# Patient Record
Sex: MALE | Race: OTHER | NOT HISPANIC OR LATINO
[De-identification: names, ages, dates, MRNs, and addresses within clinical notes are randomized per-mention and may not be internally consistent; named-entity substitution may affect disease eponyms.]

---

## 2017-01-05 ENCOUNTER — RX RENEWAL (OUTPATIENT)
Age: 67
End: 2017-01-05

## 2017-01-05 DIAGNOSIS — F17.210 NICOTINE DEPENDENCE, CIGARETTES, UNCOMPLICATED: ICD-10-CM

## 2017-01-11 ENCOUNTER — RX RENEWAL (OUTPATIENT)
Age: 67
End: 2017-01-11

## 2017-03-07 ENCOUNTER — APPOINTMENT (OUTPATIENT)
Dept: PULMONOLOGY | Facility: CLINIC | Age: 67
End: 2017-03-07

## 2017-03-07 VITALS
SYSTOLIC BLOOD PRESSURE: 134 MMHG | BODY MASS INDEX: 36.2 KG/M2 | OXYGEN SATURATION: 96 % | TEMPERATURE: 98.1 F | WEIGHT: 250 LBS | DIASTOLIC BLOOD PRESSURE: 83 MMHG | HEIGHT: 69.5 IN | RESPIRATION RATE: 16 BRPM | HEART RATE: 67 BPM

## 2017-03-07 DIAGNOSIS — I73.9 PERIPHERAL VASCULAR DISEASE, UNSPECIFIED: ICD-10-CM

## 2017-03-07 DIAGNOSIS — M51.36 OTHER INTERVERTEBRAL DISC DEGENERATION, LUMBAR REGION: ICD-10-CM

## 2017-06-06 ENCOUNTER — APPOINTMENT (OUTPATIENT)
Dept: PULMONOLOGY | Facility: CLINIC | Age: 67
End: 2017-06-06

## 2017-06-06 VITALS
SYSTOLIC BLOOD PRESSURE: 139 MMHG | HEART RATE: 71 BPM | BODY MASS INDEX: 37.06 KG/M2 | DIASTOLIC BLOOD PRESSURE: 86 MMHG | TEMPERATURE: 97.8 F | OXYGEN SATURATION: 94 % | HEIGHT: 69.5 IN | RESPIRATION RATE: 15 BRPM | WEIGHT: 256 LBS

## 2017-06-06 DIAGNOSIS — J44.9 CHRONIC OBSTRUCTIVE PULMONARY DISEASE, UNSPECIFIED: ICD-10-CM

## 2017-09-06 ENCOUNTER — APPOINTMENT (OUTPATIENT)
Dept: PULMONOLOGY | Facility: CLINIC | Age: 67
End: 2017-09-06

## 2017-09-13 ENCOUNTER — TRANSCRIPTION ENCOUNTER (OUTPATIENT)
Age: 67
End: 2017-09-13

## 2018-01-16 ENCOUNTER — APPOINTMENT (OUTPATIENT)
Dept: PULMONOLOGY | Facility: CLINIC | Age: 68
End: 2018-01-16
Payer: COMMERCIAL

## 2018-01-16 VITALS
HEART RATE: 60 BPM | HEIGHT: 69.5 IN | DIASTOLIC BLOOD PRESSURE: 87 MMHG | SYSTOLIC BLOOD PRESSURE: 150 MMHG | RESPIRATION RATE: 16 BRPM | TEMPERATURE: 97.5 F | WEIGHT: 263 LBS | OXYGEN SATURATION: 97 % | BODY MASS INDEX: 38.08 KG/M2

## 2018-01-16 DIAGNOSIS — J44.9 CHRONIC OBSTRUCTIVE PULMONARY DISEASE, UNSPECIFIED: ICD-10-CM

## 2018-01-16 DIAGNOSIS — G43.009 MIGRAINE W/OUT AURA, NOT INTRACTABLE, W/OUT STATUS MIGRAINOSUS: ICD-10-CM

## 2018-01-16 DIAGNOSIS — I10 ESSENTIAL (PRIMARY) HYPERTENSION: ICD-10-CM

## 2018-01-16 DIAGNOSIS — E78.5 HYPERLIPIDEMIA, UNSPECIFIED: ICD-10-CM

## 2018-01-16 PROCEDURE — 94060 EVALUATION OF WHEEZING: CPT

## 2018-01-16 PROCEDURE — 94727 GAS DIL/WSHOT DETER LNG VOL: CPT

## 2018-01-16 PROCEDURE — 99214 OFFICE O/P EST MOD 30 MIN: CPT | Mod: 25

## 2018-01-16 PROCEDURE — 94729 DIFFUSING CAPACITY: CPT

## 2018-04-05 ENCOUNTER — TRANSCRIPTION ENCOUNTER (OUTPATIENT)
Age: 68
End: 2018-04-05

## 2018-04-06 ENCOUNTER — RESULT REVIEW (OUTPATIENT)
Age: 68
End: 2018-04-06

## 2018-04-06 ENCOUNTER — INPATIENT (INPATIENT)
Facility: HOSPITAL | Age: 68
LOS: 9 days | Discharge: NURSING, REHAB OR LONG-TERM CARE | DRG: 853 | End: 2018-04-16
Attending: SURGERY | Admitting: HOSPITALIST
Payer: MEDICARE

## 2018-04-06 VITALS
TEMPERATURE: 95 F | OXYGEN SATURATION: 94 % | HEART RATE: 92 BPM | DIASTOLIC BLOOD PRESSURE: 68 MMHG | RESPIRATION RATE: 16 BRPM | SYSTOLIC BLOOD PRESSURE: 113 MMHG

## 2018-04-06 DIAGNOSIS — L03.119 CELLULITIS OF UNSPECIFIED PART OF LIMB: ICD-10-CM

## 2018-04-06 DIAGNOSIS — L03.90 CELLULITIS, UNSPECIFIED: ICD-10-CM

## 2018-04-06 DIAGNOSIS — N17.9 ACUTE KIDNEY FAILURE, UNSPECIFIED: ICD-10-CM

## 2018-04-06 DIAGNOSIS — Z29.9 ENCOUNTER FOR PROPHYLACTIC MEASURES, UNSPECIFIED: ICD-10-CM

## 2018-04-06 DIAGNOSIS — I10 ESSENTIAL (PRIMARY) HYPERTENSION: ICD-10-CM

## 2018-04-06 DIAGNOSIS — J44.9 CHRONIC OBSTRUCTIVE PULMONARY DISEASE, UNSPECIFIED: ICD-10-CM

## 2018-04-06 DIAGNOSIS — A41.9 SEPSIS, UNSPECIFIED ORGANISM: ICD-10-CM

## 2018-04-06 LAB
ALBUMIN SERPL ELPH-MCNC: 3.3 G/DL — SIGNIFICANT CHANGE UP (ref 3.3–5)
ALP SERPL-CCNC: 113 U/L — SIGNIFICANT CHANGE UP (ref 40–120)
ALT FLD-CCNC: 46 U/L RC — HIGH (ref 10–45)
ANION GAP SERPL CALC-SCNC: 12 MMOL/L — SIGNIFICANT CHANGE UP (ref 5–17)
ANION GAP SERPL CALC-SCNC: 14 MMOL/L — SIGNIFICANT CHANGE UP (ref 5–17)
ANION GAP SERPL CALC-SCNC: 17 MMOL/L — SIGNIFICANT CHANGE UP (ref 5–17)
AST SERPL-CCNC: 32 U/L — SIGNIFICANT CHANGE UP (ref 10–40)
BASE EXCESS BLDV CALC-SCNC: 2.2 MMOL/L — HIGH (ref -2–2)
BASOPHILS # BLD AUTO: 0 K/UL — SIGNIFICANT CHANGE UP (ref 0–0.2)
BILIRUB SERPL-MCNC: 1.6 MG/DL — HIGH (ref 0.2–1.2)
BLD GP AB SCN SERPL QL: NEGATIVE — SIGNIFICANT CHANGE UP
BUN SERPL-MCNC: 40 MG/DL — HIGH (ref 7–23)
BUN SERPL-MCNC: 41 MG/DL — HIGH (ref 7–23)
BUN SERPL-MCNC: 41 MG/DL — HIGH (ref 7–23)
CA-I SERPL-SCNC: 1.1 MMOL/L — LOW (ref 1.12–1.3)
CALCIUM SERPL-MCNC: 8.2 MG/DL — LOW (ref 8.4–10.5)
CALCIUM SERPL-MCNC: 8.9 MG/DL — SIGNIFICANT CHANGE UP (ref 8.4–10.5)
CALCIUM SERPL-MCNC: 9.8 MG/DL — SIGNIFICANT CHANGE UP (ref 8.4–10.5)
CHLORIDE BLDV-SCNC: 104 MMOL/L — SIGNIFICANT CHANGE UP (ref 96–108)
CHLORIDE SERPL-SCNC: 95 MMOL/L — LOW (ref 96–108)
CHLORIDE SERPL-SCNC: 97 MMOL/L — SIGNIFICANT CHANGE UP (ref 96–108)
CHLORIDE SERPL-SCNC: 97 MMOL/L — SIGNIFICANT CHANGE UP (ref 96–108)
CO2 BLDV-SCNC: 28 MMOL/L — SIGNIFICANT CHANGE UP (ref 22–30)
CO2 SERPL-SCNC: 24 MMOL/L — SIGNIFICANT CHANGE UP (ref 22–31)
CREAT SERPL-MCNC: 1.35 MG/DL — HIGH (ref 0.5–1.3)
CREAT SERPL-MCNC: 1.51 MG/DL — HIGH (ref 0.5–1.3)
CREAT SERPL-MCNC: 1.82 MG/DL — HIGH (ref 0.5–1.3)
EOSINOPHIL # BLD AUTO: 0.1 K/UL — SIGNIFICANT CHANGE UP (ref 0–0.5)
EOSINOPHIL NFR BLD AUTO: 1 % — SIGNIFICANT CHANGE UP (ref 0–6)
ERYTHROCYTE [SEDIMENTATION RATE] IN BLOOD: 66 MM/HR — HIGH (ref 0–20)
GAS PNL BLDV: 128 MMOL/L — LOW (ref 136–145)
GAS PNL BLDV: SIGNIFICANT CHANGE UP
GAS PNL BLDV: SIGNIFICANT CHANGE UP
GLUCOSE BLDV-MCNC: 139 MG/DL — HIGH (ref 70–99)
GLUCOSE SERPL-MCNC: 115 MG/DL — HIGH (ref 70–99)
GLUCOSE SERPL-MCNC: 139 MG/DL — HIGH (ref 70–99)
GLUCOSE SERPL-MCNC: 196 MG/DL — HIGH (ref 70–99)
HBA1C BLD-MCNC: 6 % — HIGH (ref 4–5.6)
HCO3 BLDV-SCNC: 27 MMOL/L — SIGNIFICANT CHANGE UP (ref 21–29)
HCT VFR BLD CALC: 36.9 % — LOW (ref 39–50)
HCT VFR BLD CALC: 47.5 % — SIGNIFICANT CHANGE UP (ref 39–50)
HCT VFR BLDA CALC: 52 % — HIGH (ref 39–50)
HGB BLD CALC-MCNC: 17.1 G/DL — HIGH (ref 13–17)
HGB BLD-MCNC: 12.4 G/DL — LOW (ref 13–17)
HGB BLD-MCNC: 16.1 G/DL — SIGNIFICANT CHANGE UP (ref 13–17)
LACTATE BLDV-MCNC: 2.8 MMOL/L — HIGH (ref 0.7–2)
LACTATE SERPL-SCNC: 1.6 MMOL/L — SIGNIFICANT CHANGE UP (ref 0.7–2)
LYMPHOCYTES # BLD AUTO: 0.7 K/UL — LOW (ref 1–3.3)
LYMPHOCYTES # BLD AUTO: 4 % — LOW (ref 13–44)
MCHC RBC-ENTMCNC: 32.2 PG — SIGNIFICANT CHANGE UP (ref 27–34)
MCHC RBC-ENTMCNC: 32.2 PG — SIGNIFICANT CHANGE UP (ref 27–34)
MCHC RBC-ENTMCNC: 33.7 GM/DL — SIGNIFICANT CHANGE UP (ref 32–36)
MCHC RBC-ENTMCNC: 33.9 GM/DL — SIGNIFICANT CHANGE UP (ref 32–36)
MCV RBC AUTO: 94.9 FL — SIGNIFICANT CHANGE UP (ref 80–100)
MCV RBC AUTO: 95.6 FL — SIGNIFICANT CHANGE UP (ref 80–100)
MONOCYTES # BLD AUTO: 1 K/UL — HIGH (ref 0–0.9)
MONOCYTES NFR BLD AUTO: 8 % — SIGNIFICANT CHANGE UP (ref 2–14)
NEUTROPHILS # BLD AUTO: 15.7 K/UL — HIGH (ref 1.8–7.4)
NEUTROPHILS NFR BLD AUTO: 82 % — HIGH (ref 43–77)
NEUTS BAND # BLD: 5 % — SIGNIFICANT CHANGE UP (ref 0–8)
OTHER CELLS CSF MANUAL: 10 ML/DL — LOW (ref 18–22)
PCO2 BLDV: 45 MMHG — SIGNIFICANT CHANGE UP (ref 35–50)
PH BLDV: 7.4 — SIGNIFICANT CHANGE UP (ref 7.35–7.45)
PLAT MORPH BLD: NORMAL — SIGNIFICANT CHANGE UP
PLATELET # BLD AUTO: 154 K/UL — SIGNIFICANT CHANGE UP (ref 150–400)
PLATELET # BLD AUTO: 195 K/UL — SIGNIFICANT CHANGE UP (ref 150–400)
PO2 BLDV: 26 MMHG — SIGNIFICANT CHANGE UP (ref 25–45)
POTASSIUM BLDV-SCNC: 3.2 MMOL/L — LOW (ref 3.5–5)
POTASSIUM SERPL-MCNC: 3.4 MMOL/L — LOW (ref 3.5–5.3)
POTASSIUM SERPL-MCNC: 3.7 MMOL/L — SIGNIFICANT CHANGE UP (ref 3.5–5.3)
POTASSIUM SERPL-MCNC: 3.8 MMOL/L — SIGNIFICANT CHANGE UP (ref 3.5–5.3)
POTASSIUM SERPL-SCNC: 3.4 MMOL/L — LOW (ref 3.5–5.3)
POTASSIUM SERPL-SCNC: 3.7 MMOL/L — SIGNIFICANT CHANGE UP (ref 3.5–5.3)
POTASSIUM SERPL-SCNC: 3.8 MMOL/L — SIGNIFICANT CHANGE UP (ref 3.5–5.3)
PROT SERPL-MCNC: 7.9 G/DL — SIGNIFICANT CHANGE UP (ref 6–8.3)
RBC # BLD: 3.86 M/UL — LOW (ref 4.2–5.8)
RBC # BLD: 5 M/UL — SIGNIFICANT CHANGE UP (ref 4.2–5.8)
RBC # FLD: 13.3 % — SIGNIFICANT CHANGE UP (ref 10.3–14.5)
RBC # FLD: 13.4 % — SIGNIFICANT CHANGE UP (ref 10.3–14.5)
RBC BLD AUTO: SIGNIFICANT CHANGE UP
RH IG SCN BLD-IMP: POSITIVE — SIGNIFICANT CHANGE UP
SAO2 % BLDV: 44 % — LOW (ref 67–88)
SODIUM SERPL-SCNC: 133 MMOL/L — LOW (ref 135–145)
SODIUM SERPL-SCNC: 135 MMOL/L — SIGNIFICANT CHANGE UP (ref 135–145)
SODIUM SERPL-SCNC: 136 MMOL/L — SIGNIFICANT CHANGE UP (ref 135–145)
WBC # BLD: 12.2 K/UL — HIGH (ref 3.8–10.5)
WBC # BLD: 17.4 K/UL — HIGH (ref 3.8–10.5)
WBC # FLD AUTO: 12.2 K/UL — HIGH (ref 3.8–10.5)
WBC # FLD AUTO: 17.4 K/UL — HIGH (ref 3.8–10.5)

## 2018-04-06 PROCEDURE — 99223 1ST HOSP IP/OBS HIGH 75: CPT | Mod: GC

## 2018-04-06 PROCEDURE — 73620 X-RAY EXAM OF FOOT: CPT | Mod: 26,LT

## 2018-04-06 PROCEDURE — 99285 EMERGENCY DEPT VISIT HI MDM: CPT | Mod: GC

## 2018-04-06 PROCEDURE — 73590 X-RAY EXAM OF LOWER LEG: CPT | Mod: 26,LT

## 2018-04-06 PROCEDURE — 11042 DBRDMT SUBQ TIS 1ST 20SQCM/<: CPT | Mod: 59,GC

## 2018-04-06 PROCEDURE — 76377 3D RENDER W/INTRP POSTPROCES: CPT | Mod: 26

## 2018-04-06 PROCEDURE — 73700 CT LOWER EXTREMITY W/O DYE: CPT | Mod: 26,LT

## 2018-04-06 PROCEDURE — 73610 X-RAY EXAM OF ANKLE: CPT | Mod: 26,LT

## 2018-04-06 PROCEDURE — 28008 INCISION OF FOOT FASCIA: CPT | Mod: GC

## 2018-04-06 PROCEDURE — 27600 DECOMPRESSION OF LOWER LEG: CPT | Mod: GC

## 2018-04-06 PROCEDURE — 11045 DBRDMT SUBQ TISS EACH ADDL: CPT | Mod: 59,GC

## 2018-04-06 PROCEDURE — 88304 TISSUE EXAM BY PATHOLOGIST: CPT | Mod: 26

## 2018-04-06 PROCEDURE — 99223 1ST HOSP IP/OBS HIGH 75: CPT

## 2018-04-06 PROCEDURE — 99254 IP/OBS CNSLTJ NEW/EST MOD 60: CPT | Mod: 25

## 2018-04-06 RX ORDER — CEFEPIME 1 G/1
2000 INJECTION, POWDER, FOR SOLUTION INTRAMUSCULAR; INTRAVENOUS EVERY 12 HOURS
Qty: 0 | Refills: 0 | Status: DISCONTINUED | OUTPATIENT
Start: 2018-04-06 | End: 2018-04-06

## 2018-04-06 RX ORDER — HYDROMORPHONE HYDROCHLORIDE 2 MG/ML
0.5 INJECTION INTRAMUSCULAR; INTRAVENOUS; SUBCUTANEOUS
Qty: 0 | Refills: 0 | Status: DISCONTINUED | OUTPATIENT
Start: 2018-04-06 | End: 2018-04-06

## 2018-04-06 RX ORDER — VANCOMYCIN HCL 1 G
2000 VIAL (EA) INTRAVENOUS ONCE
Qty: 0 | Refills: 0 | Status: COMPLETED | OUTPATIENT
Start: 2018-04-06 | End: 2018-04-06

## 2018-04-06 RX ORDER — SODIUM CHLORIDE 9 MG/ML
1000 INJECTION INTRAMUSCULAR; INTRAVENOUS; SUBCUTANEOUS ONCE
Qty: 0 | Refills: 0 | Status: COMPLETED | OUTPATIENT
Start: 2018-04-06 | End: 2018-04-06

## 2018-04-06 RX ORDER — SODIUM CHLORIDE 9 MG/ML
500 INJECTION INTRAMUSCULAR; INTRAVENOUS; SUBCUTANEOUS ONCE
Qty: 0 | Refills: 0 | Status: COMPLETED | OUTPATIENT
Start: 2018-04-06 | End: 2018-04-06

## 2018-04-06 RX ORDER — HYDROMORPHONE HYDROCHLORIDE 2 MG/ML
1 INJECTION INTRAMUSCULAR; INTRAVENOUS; SUBCUTANEOUS
Qty: 0 | Refills: 0 | Status: DISCONTINUED | OUTPATIENT
Start: 2018-04-06 | End: 2018-04-07

## 2018-04-06 RX ORDER — MEROPENEM 1 G/30ML
1000 INJECTION INTRAVENOUS EVERY 8 HOURS
Qty: 0 | Refills: 0 | Status: DISCONTINUED | OUTPATIENT
Start: 2018-04-06 | End: 2018-04-06

## 2018-04-06 RX ORDER — IPRATROPIUM/ALBUTEROL SULFATE 18-103MCG
3 AEROSOL WITH ADAPTER (GRAM) INHALATION EVERY 6 HOURS
Qty: 0 | Refills: 0 | Status: DISCONTINUED | OUTPATIENT
Start: 2018-04-06 | End: 2018-04-06

## 2018-04-06 RX ORDER — VANCOMYCIN HCL 1 G
1000 VIAL (EA) INTRAVENOUS EVERY 8 HOURS
Qty: 0 | Refills: 0 | Status: DISCONTINUED | OUTPATIENT
Start: 2018-04-06 | End: 2018-04-08

## 2018-04-06 RX ORDER — VANCOMYCIN HCL 1 G
500 VIAL (EA) INTRAVENOUS EVERY 8 HOURS
Qty: 0 | Refills: 0 | Status: DISCONTINUED | OUTPATIENT
Start: 2018-04-06 | End: 2018-04-06

## 2018-04-06 RX ORDER — CEFEPIME 1 G/1
2000 INJECTION, POWDER, FOR SOLUTION INTRAMUSCULAR; INTRAVENOUS EVERY 24 HOURS
Qty: 0 | Refills: 0 | Status: DISCONTINUED | OUTPATIENT
Start: 2018-04-06 | End: 2018-04-06

## 2018-04-06 RX ORDER — ONDANSETRON 8 MG/1
4 TABLET, FILM COATED ORAL EVERY 6 HOURS
Qty: 0 | Refills: 0 | Status: DISCONTINUED | OUTPATIENT
Start: 2018-04-06 | End: 2018-04-06

## 2018-04-06 RX ORDER — MEROPENEM 1 G/30ML
1000 INJECTION INTRAVENOUS ONCE
Qty: 0 | Refills: 0 | Status: COMPLETED | OUTPATIENT
Start: 2018-04-06 | End: 2018-04-06

## 2018-04-06 RX ORDER — MEROPENEM 1 G/30ML
1000 INJECTION INTRAVENOUS EVERY 8 HOURS
Qty: 0 | Refills: 0 | Status: DISCONTINUED | OUTPATIENT
Start: 2018-04-06 | End: 2018-04-08

## 2018-04-06 RX ORDER — SODIUM CHLORIDE 9 MG/ML
1000 INJECTION INTRAMUSCULAR; INTRAVENOUS; SUBCUTANEOUS
Qty: 0 | Refills: 0 | Status: DISCONTINUED | OUTPATIENT
Start: 2018-04-06 | End: 2018-04-06

## 2018-04-06 RX ORDER — NALOXONE HYDROCHLORIDE 4 MG/.1ML
0.1 SPRAY NASAL
Qty: 0 | Refills: 0 | Status: DISCONTINUED | OUTPATIENT
Start: 2018-04-06 | End: 2018-04-06

## 2018-04-06 RX ORDER — VANCOMYCIN HCL 1 G
1000 VIAL (EA) INTRAVENOUS EVERY 8 HOURS
Qty: 0 | Refills: 0 | Status: DISCONTINUED | OUTPATIENT
Start: 2018-04-06 | End: 2018-04-06

## 2018-04-06 RX ORDER — SODIUM CHLORIDE 9 MG/ML
1000 INJECTION, SOLUTION INTRAVENOUS
Qty: 0 | Refills: 0 | Status: DISCONTINUED | OUTPATIENT
Start: 2018-04-06 | End: 2018-04-06

## 2018-04-06 RX ORDER — HEPARIN SODIUM 5000 [USP'U]/ML
5000 INJECTION INTRAVENOUS; SUBCUTANEOUS EVERY 8 HOURS
Qty: 0 | Refills: 0 | Status: DISCONTINUED | OUTPATIENT
Start: 2018-04-06 | End: 2018-04-06

## 2018-04-06 RX ORDER — HEPARIN SODIUM 5000 [USP'U]/ML
5000 INJECTION INTRAVENOUS; SUBCUTANEOUS EVERY 8 HOURS
Qty: 0 | Refills: 0 | Status: DISCONTINUED | OUTPATIENT
Start: 2018-04-06 | End: 2018-04-08

## 2018-04-06 RX ORDER — METOPROLOL TARTRATE 50 MG
5 TABLET ORAL EVERY 6 HOURS
Qty: 0 | Refills: 0 | Status: DISCONTINUED | OUTPATIENT
Start: 2018-04-06 | End: 2018-04-06

## 2018-04-06 RX ORDER — HYDROMORPHONE HYDROCHLORIDE 2 MG/ML
30 INJECTION INTRAMUSCULAR; INTRAVENOUS; SUBCUTANEOUS
Qty: 0 | Refills: 0 | Status: DISCONTINUED | OUTPATIENT
Start: 2018-04-06 | End: 2018-04-06

## 2018-04-06 RX ORDER — HYDROMORPHONE HYDROCHLORIDE 2 MG/ML
0.5 INJECTION INTRAMUSCULAR; INTRAVENOUS; SUBCUTANEOUS
Qty: 0 | Refills: 0 | Status: DISCONTINUED | OUTPATIENT
Start: 2018-04-06 | End: 2018-04-07

## 2018-04-06 RX ORDER — CEFEPIME 1 G/1
1000 INJECTION, POWDER, FOR SOLUTION INTRAMUSCULAR; INTRAVENOUS ONCE
Qty: 0 | Refills: 0 | Status: COMPLETED | OUTPATIENT
Start: 2018-04-06 | End: 2018-04-06

## 2018-04-06 RX ADMIN — SODIUM CHLORIDE 100 MILLILITER(S): 9 INJECTION INTRAMUSCULAR; INTRAVENOUS; SUBCUTANEOUS at 06:45

## 2018-04-06 RX ADMIN — SODIUM CHLORIDE 1000 MILLILITER(S): 9 INJECTION INTRAMUSCULAR; INTRAVENOUS; SUBCUTANEOUS at 02:46

## 2018-04-06 RX ADMIN — Medication 100 MILLIGRAM(S): at 23:21

## 2018-04-06 RX ADMIN — MEROPENEM 100 MILLIGRAM(S): 1 INJECTION INTRAVENOUS at 06:45

## 2018-04-06 RX ADMIN — MEROPENEM 100 MILLIGRAM(S): 1 INJECTION INTRAVENOUS at 20:20

## 2018-04-06 RX ADMIN — CEFEPIME 100 MILLIGRAM(S): 1 INJECTION, POWDER, FOR SOLUTION INTRAMUSCULAR; INTRAVENOUS at 02:58

## 2018-04-06 RX ADMIN — Medication 250 MILLIGRAM(S): at 02:58

## 2018-04-06 RX ADMIN — SODIUM CHLORIDE 500 MILLILITER(S): 9 INJECTION INTRAMUSCULAR; INTRAVENOUS; SUBCUTANEOUS at 19:58

## 2018-04-06 RX ADMIN — Medication 250 MILLIGRAM(S): at 19:36

## 2018-04-06 RX ADMIN — SODIUM CHLORIDE 1000 MILLILITER(S): 9 INJECTION INTRAMUSCULAR; INTRAVENOUS; SUBCUTANEOUS at 02:58

## 2018-04-06 RX ADMIN — HEPARIN SODIUM 5000 UNIT(S): 5000 INJECTION INTRAVENOUS; SUBCUTANEOUS at 23:06

## 2018-04-06 RX ADMIN — Medication 100 MILLIGRAM(S): at 05:45

## 2018-04-06 NOTE — H&P ADULT - NSHPSOCIALHISTORY_GEN_ALL_CORE
former smoker former smoker (since 19yo, 2ppd, quit ~1.5yrs ago) Former smoker (since 17yo, 2ppd, quit ~1.5yrs ago), denies EtOH, drug abuse   (3 yrs), lives with wife and a dog  Independent ADLs at home

## 2018-04-06 NOTE — H&P ADULT - HISTORY OF PRESENT ILLNESS
67M with PMH COPD, HTN presenting with LLE pain (8/10), discoloration, and weeping since Wednesday. Pt was in the [facility] and felt an insect bite on his foot on Wednesday night. Pain began the next morning. Pt denies any fever, chills, SOB, palpitations, n/v/d/c. 67M with PMH COPD, PAD, HTN presenting with LLE pain (8/10), discoloration. Pt was in the [facility] and felt a bug bite on LLE on Sunday night. Pain began the next morning, and he started to have erythema in the proximal left thigh and left anterior thigh. Erythema and pain improved in the proximal LE, but worsened in distal. On Wednesday, pt was flying back to the , and noticed bullae formation on the L. foot, which he punctured w/ a toothpick, inducing yellow pus.   Pt denies any fever, chills, SOB, palpitations, n/v/d/c. 67M with PMH COPD, PAD, HTN presenting with LLE pain (8/10), discoloration. Pt was in the [facility] and felt a bug bite on LLE on Sunday night. Pain began the next morning, and he started to have erythema in the proximal left thigh and left anterior thigh. Erythema and pain improved in the proximal LE, but worsened in distally to include the L foot. On Wednesday, pt was flying back to the , and noticed bullae formation on the L. foot, which he punctured w/ a toothpick, inducing yellow pus. Associated symptoms include: cough w/ green sputum, feel hot/cold, Of note, pt was using naproxen for pain and had contact w/ stray dogs in the [facility].    Pt denies any recent antibiotic use, hospitalizations, sick contacts, SOB, CP, palpitations, n/v/d/c.    In ED, vitals were T: 95.2 (improved to 98), HR: 62-92, BP: 105-119/ 62-72 RR; 18, SpO2: 98% RA  Pt was given 2L NS, IVF NS @ 100 mL/hr, Vancomycin 2g, Cefepime 1g, Clindamycin 600mg, Meropenem 1g  Labs were significant: WBC: 17.4, SCr: 1.82 (no baseline), TBili: 1.6, ESR: 66  CT scan: circumferential SQ edema in LLE, but no soft tissue gas, or foreign body 67M with PMH COPD, PAD, HTN presenting with LLE pain (8/10), discoloration. Pt was in the [facility] and felt a bug bite on LLE on Sunday night. Pain began the next morning, and he started to have erythema in the proximal left thigh and left anterior thigh. Erythema and pain improved in the proximal LE, but worsened in distally to include the L foot. On Wednesday, pt was flying back to the , and noticed bullae formation on the L. foot, which he punctured w/ a toothpick, inducing yellow pus. Associated symptoms include: cough w/ green sputum (since Sun night), feel hot/cold (Mon only), Of note, pt was using naproxen for pain and had contact w/ stray dogs in the [facility].    Pt denies any recent antibiotic use, hospitalizations, sick contacts, SOB, CP, palpitations, n/v/d/c.    In ED, vitals were T: 95.2 (improved to 98), HR: 62-92, BP: 105-119/ 62-72 RR; 18, SpO2: 98% RA  Pt was given 2L NS, IVF NS @ 100 mL/hr, Vancomycin 2g, Cefepime 1g, Clindamycin 600mg, Meropenem 1g  Labs were significant: WBC: 17.4, SCr: 1.82 (no baseline), TBili: 1.6, ESR: 66  CT scan: circumferential SQ edema in LLE, but no soft tissue gas, or foreign body   LLE X-rays: no soft tissue gas, no osteomyelitis 67M with PMH COPD, PAD, HTN presenting with LLE pain (8/10), discoloration. Pt was in the [facility] and felt a bug bite on LLE on Sunday night. Pain began the next morning, and he started to have erythema in the proximal left thigh and left anterior thigh. Erythema and pain improved in the proximal LE, but worsened in distally to include the L foot. On Wednesday, pt was flying back to the , and noticed bullae formation on the L. foot, which he punctured w/ a toothpick, inducing yellow pus. Associated symptoms include: loss of appetite, cough w/ green sputum (since Sun night), feel hot/cold (Mon only), Of note, pt was using naproxen for pain and had contact w/ stray dogs in the [facility].    Pt denies any recent antibiotic use, hospitalizations, sick contacts, SOB, CP, palpitations, n/v/d/c.    In ED, vitals were T: 95.2 (improved to 98), HR: 62-92, BP: 105-119/ 62-72 RR; 18, SpO2: 98% RA  Pt was given 2L NS, IVF NS @ 100 mL/hr, Vancomycin 2g, Cefepime 1g, Clindamycin 600mg, Meropenem 1g  Labs were significant: WBC: 17.4, SCr: 1.82 (no baseline), TBili: 1.6, ESR: 66  CT scan: circumferential SQ edema in LLE, but no soft tissue gas, or foreign body   LLE X-rays: no soft tissue gas, no osteomyelitis

## 2018-04-06 NOTE — CONSULT NOTE ADULT - SUBJECTIVE AND OBJECTIVE BOX
67 male with COPD, PAD, HTN presenting with LLE pain (8/10), discoloration. Pt was in the [facility] on vacation to visit his wife's family and his left leg started becoming red and swollen on Sunday night. Pain began the next morning, and he started to have erythema in the proximal left thigh and left anterior thigh. Erythema and pain improved in the proximal LE, but worsened in distal. On Wednesday, pt was flying back to the [place], and noticed bullae formation on the L. foot, which he punctured w/ a toothpick, inducing yellow pus. Pt denies any fever, chills, and SOB. No sick contacts. He got 1 dose of vancomycin, meropenem, clindamycin, cefepime in the ed. NO confirmed insect bite.       REVIEW OF SYSTEMS  All as below unless noted otherwise    General:  Denies fever and chills. 	  Ophthalmologic: Denies any visual complaints. 	  ENMT: No throat pain.  Respiratory: No cough, sputum. No shortness of breath.   Cardiovascular: No chest pain.   Gastrointestinal: No nausea or vomiting. No abdominal pain or diarrhea.   Genitourinary: No burning urine, no frequency. No flank pain  Musculoskeletal: has left leg and foot pain, swelling and redness   Neurological: No confusion. 	  Skin: No rash    PAST MEDICAL & SURGICAL HISTORY:  Smoking addiction  COPD  PAD  HTN  Primary osteoarthritis of right hip  Seasonal allergies  Asthma: no h/o hospitalization or intubation  HTN (hypertension)  S/P hip replacement: right  Cataract: h/o Cataract surgery  4 yrs ago  Colon polyp: h/o colon polypectomy and partial colectomy 2007  Gall stones: h/o Cholecystectomy 2008    FAMILY HISTORY:   COPD in father  GI bleed in mother     SOCIAL HISTORY:    former smoker     retired postman     ANTIMICROBIALS:    got 1 dose of vancomycin, meropenem, clindamycin, cefepime    OTHER MEDS:    heparin  Injectable 5000 Unit(s) SubCutaneous every 8 hours  sodium chloride 0.9%. 1000 milliLiter(s) IV Continuous <Continuous>    Allergies  All nuts (Anaphylaxis)  penicillin (Anaphylaxis)  shellfish (Anaphylaxis)      Vital Signs Last 24 Hrs  T(C): 36.4 (06 Apr 2018 09:26), Max: 36.8 (06 Apr 2018 07:30)  T(F): 97.6 (06 Apr 2018 09:26), Max: 98.2 (06 Apr 2018 07:30)  HR: 77 (06 Apr 2018 09:26) (62 - 92)  BP: 108/69 (06 Apr 2018 09:26) (105/62 - 116/72)  RR: 18 (06 Apr 2018 09:26) (16 - 18)  SpO2: 98% (06 Apr 2018 09:26) (94% - 98%)      PHYSICAL EXAM:  patient in no acute respiratory distress.  Constitutional: Comfortable. Awake and alert- obese male  Eyes: PERRL EOMI. No discharge.  ENMT: No sinus tenderness.  No pharyngeal erythema.   Neck: Supple. No thyromegaly   Respiratory:  air entry bilaterally with some wheezes, no rhonchi, or crackles  Cardiovascular:S1 S2 wnl, No murmurs  Gastrointestinal: Soft, no tenderness , bowel sounds present,   Genitourinary: No suprapubic tenderness. no CVA tenderness   Musculoskeletal: Left leg swollen, warm, erythematous with one draining ulcer- yellow clear fluid  left foot on the dorsum seem to have some purulent fluid under the skin and swollen and very tender with some black and white tissue- concern for necrotic tissue, foul smelling, with areas of blood underneath that loose skin layer almost like a large bullae  Vascular: peripheral pulses felt  Neurological: No grossly focal deficits  Skin: see above  Lymph Nodes: No palpable Lymphadenopathy   Psychiatric: Affect normal  Lines:                           16.1   17.4  )-----------( 195      ( 06 Apr 2018 02:41 )             47.5     WBC Count: 17.4 (04-06 @ 02:41)    04-06    136  |  95<L>  |  40<H>  ----------------------------<  139<H>  3.4<L>   |  24  |  1.82<H>    Ca    9.8      06 Apr 2018 02:41    TPro  7.9  /  Alb  3.3  /  TBili  1.6<H>  /  DBili  x   /  AST  32  /  ALT  46<H>  /  AlkPhos  113  04-06    Blood Gas Venous - Lactate: 2.8 mmoL/L (04.06.18 @ 02:41)  Sedimentation Rate, Erythrocyte: 66 mm/hr (04.06.18 @ 02:41)  C-Reactive Protein, Serum: 26.90: Test Repeated mg/dL (04.06.18 @ 07:30)    MICROBIOLOGY:  Blood and urine cultures pending.      RADIOLOGY:    < from: CT 3D Reconstruct w/ Workstation (04.06.18 @ 04:57) >  There is no acute fracture or dislocation. There is no osseous erosion,   destruction, or periosteal reaction suggest osteomyelitis. There is mild   tricompartmental arthrosis of the knee. There is trace knee joint fluid.   There is an os trigonum with moderate to severe degeneration across the   synchondrosis. There is mild hallux valgus with mild first   metatarsophalangeal and hallux sesamoid joint arthrosis.    There is circumferential edema within the subcutaneous fat throughout the   imaged portion of the lower extremity which is confluent from the level   of the mid tibia/fibula to the level of the ankle. A cutaneous bleb is   noted along the dorsal lateral aspect of the foot measuring approximately   1.9 cm x 0.2 cm. Findings are consistent with cellulitis. Although   limited by CT technique edema appears predominately confined to the   subcutaneous fat without infiltration of the deeper myofascial planes.   Evaluation for abscess is limited by the lack of intravenous contrast.   There is no subcutaneous air to suggest necrotizing fasciitis. No   radiopaque foreign body is demonstrated. There is no fatty atrophy of the   visualized musculature.    Impression:    Findings consistent with cellulitis throughout the imaged portion of the   left lower extremity with edema in the subcutaneous fat. Edema is   confluent and circumferential from the level of the mid tibia and fibula   to the level of the foot. Small cutaneous bleb along the dorsal lateral   aspect of the foot. No CT evidence of osteomyelitis. Limited evaluation   for abscess. No subcutaneous air.    < from: Xray Foot AP + Lateral, Left (04.06.18 @ 02:54) >  There are no acute fractures or dislocations of the left tibia/fibula,   ankle, or foot. No osseous erosions or periosteal reaction are   visualized. The joint spaces of the left knee, ankle, and foot are   maintained. Generalized soft tissue swelling of the left lower leg and   foot without evidence of soft tissue defects or subcutaneous gas.    IMPRESSION:    1.  No radiographic evidence for osteomyelitis in the left lower leg and   foot.  2.  Generalized soft tissue swelling without evidence of subcutaneous gas   or soft tissue defect. 67 male with COPD, PAD, HTN presenting with LLE pain (8/10), discoloration. Pt was in the [facility] on vacation to visit his wife's family and his left leg started becoming red and swollen on Sunday night. Pain began the next morning, and he started to have erythema in the proximal left thigh and left anterior thigh. Erythema and pain improved in the proximal LE, but worsened in distal. On Wednesday, pt was [place] the [place], and noticed bullae formation on the L. foot, which he punctured w/ a toothpick, inducing yellow pus. Pt denies any fever, chills, and SOB. No sick contacts. He got 1 dose of vancomycin, meropenem, clindamycin, cefepime in the ed. NO confirmed insect bite.       REVIEW OF SYSTEMS  All as below unless noted otherwise    General:  Denies fever and chills. 	  Ophthalmologic: Denies any visual complaints. 	  ENMT: No throat pain.  Respiratory: No cough, sputum. No shortness of breath.   Cardiovascular: No chest pain.   Gastrointestinal: No nausea or vomiting. No abdominal pain or diarrhea.   Genitourinary: No burning urine, no frequency. No flank pain  Musculoskeletal: has left leg and foot pain, swelling and redness   Neurological: No confusion. 	  Skin: No rash    PAST MEDICAL & SURGICAL HISTORY:  Smoking addiction  COPD  PAD  HTN  Primary osteoarthritis of right hip  Seasonal allergies  Asthma: no h/o hospitalization or intubation  HTN (hypertension)  S/P hip replacement: right  Cataract: h/o Cataract surgery  4 yrs ago  Colon polyp: h/o colon polypectomy and partial colectomy 2007  Gall stones: h/o Cholecystectomy 2008    FAMILY HISTORY:   COPD in father  GI bleed in mother     SOCIAL HISTORY:    former smoker     retired postman     ANTIMICROBIALS:    got 1 dose of vancomycin, meropenem, clindamycin, cefepime    OTHER MEDS:    heparin  Injectable 5000 Unit(s) SubCutaneous every 8 hours  sodium chloride 0.9%. 1000 milliLiter(s) IV Continuous <Continuous>    Allergies  All nuts (Anaphylaxis)  penicillin (Anaphylaxis)  shellfish (Anaphylaxis)      Vital Signs Last 24 Hrs  T(C): 36.4 (06 Apr 2018 09:26), Max: 36.8 (06 Apr 2018 07:30)  T(F): 97.6 (06 Apr 2018 09:26), Max: 98.2 (06 Apr 2018 07:30)  HR: 77 (06 Apr 2018 09:26) (62 - 92)  BP: 108/69 (06 Apr 2018 09:26) (105/62 - 116/72)  RR: 18 (06 Apr 2018 09:26) (16 - 18)  SpO2: 98% (06 Apr 2018 09:26) (94% - 98%)      PHYSICAL EXAM:  patient in no acute respiratory distress.  Constitutional: Comfortable. Awake and alert- obese male  Eyes: PERRL EOMI. No discharge.  ENMT: No sinus tenderness.  No pharyngeal erythema.   Neck: Supple. No thyromegaly   Respiratory:  air entry bilaterally with some wheezes, no rhonchi, or crackles  Cardiovascular:S1 S2 wnl, No murmurs  Gastrointestinal: Soft, no tenderness , bowel sounds present,   Genitourinary: No suprapubic tenderness. no CVA tenderness   Musculoskeletal: Left leg swollen, warm, erythematous with one draining ulcer- yellow clear fluid  left foot on the dorsum seem to have some purulent fluid under the skin and swollen and very tender with some black and white tissue- concern for necrotic tissue, foul smelling, with areas of blood underneath that loose skin layer almost like a large bullae  Pulses:  Dopplerable 2+ PT bilaterally, palpable 2+ femoral bilaterally  Vascular: peripheral pulses felt  Neurological: No grossly focal deficits  Skin: see above  Lymph Nodes: No palpable Lymphadenopathy   Psychiatric: Affect normal  Lines:                           16.1   17.4  )-----------( 195      ( 06 Apr 2018 02:41 )             47.5     WBC Count: 17.4 (04-06 @ 02:41)    04-06    136  |  95<L>  |  40<H>  ----------------------------<  139<H>  3.4<L>   |  24  |  1.82<H>    Ca    9.8      06 Apr 2018 02:41    TPro  7.9  /  Alb  3.3  /  TBili  1.6<H>  /  DBili  x   /  AST  32  /  ALT  46<H>  /  AlkPhos  113  04-06    Blood Gas Venous - Lactate: 2.8 mmoL/L (04.06.18 @ 02:41)  Sedimentation Rate, Erythrocyte: 66 mm/hr (04.06.18 @ 02:41)  C-Reactive Protein, Serum: 26.90: Test Repeated mg/dL (04.06.18 @ 07:30)    MICROBIOLOGY:  Blood and urine cultures pending.      RADIOLOGY:    < from: CT 3D Reconstruct w/ Workstation (04.06.18 @ 04:57) >  There is no acute fracture or dislocation. There is no osseous erosion,   destruction, or periosteal reaction suggest osteomyelitis. There is mild   tricompartmental arthrosis of the knee. There is trace knee joint fluid.   There is an os trigonum with moderate to severe degeneration across the   synchondrosis. There is mild hallux valgus with mild first   metatarsophalangeal and hallux sesamoid joint arthrosis.    There is circumferential edema within the subcutaneous fat throughout the   imaged portion of the lower extremity which is confluent from the level   of the mid tibia/fibula to the level of the ankle. A cutaneous bleb is   noted along the dorsal lateral aspect of the foot measuring approximately   1.9 cm x 0.2 cm. Findings are consistent with cellulitis. Although   limited by CT technique edema appears predominately confined to the   subcutaneous fat without infiltration of the deeper myofascial planes.   Evaluation for abscess is limited by the lack of intravenous contrast.   There is no subcutaneous air to suggest necrotizing fasciitis. No   radiopaque foreign body is demonstrated. There is no fatty atrophy of the   visualized musculature.    Impression:    Findings consistent with cellulitis throughout the imaged portion of the   left lower extremity with edema in the subcutaneous fat. Edema is   confluent and circumferential from the level of the mid tibia and fibula   to the level of the foot. Small cutaneous bleb along the dorsal lateral   aspect of the foot. No CT evidence of osteomyelitis. Limited evaluation   for abscess. No subcutaneous air.    < from: Xray Foot AP + Lateral, Left (04.06.18 @ 02:54) >  There are no acute fractures or dislocations of the left tibia/fibula,   ankle, or foot. No osseous erosions or periosteal reaction are   visualized. The joint spaces of the left knee, ankle, and foot are   maintained. Generalized soft tissue swelling of the left lower leg and   foot without evidence of soft tissue defects or subcutaneous gas.    IMPRESSION:    1.  No radiographic evidence for osteomyelitis in the left lower leg and   foot.  2.  Generalized soft tissue swelling without evidence of subcutaneous gas   or soft tissue defect.

## 2018-04-06 NOTE — H&P ADULT - PROBLEM SELECTOR PLAN 2
Presenting in severe sepsis, worsening cellulitis of LLE w/ burst bullae, etiology unknown at this time, poss 2/2 to bug bite in [place]   Plan as above:  C/w vanco/wilmer  F/u ID recs  F/u BCx   Trend CBC   Monitor temperature curve  Consider wound care c/s if not improving Presenting in severe sepsis, worsening cellulitis of LLE w/ burst bullae, etiology unknown at this time, poss 2/2 to bug bite in [place]   Plan as above:  C/w wilmer/clinda/vanco   F/u ID recs, abx as above  Surgery c/s for poss necrotic wound   F/u BCx   Trend CBC   Monitor temperature curve  Consider wound care c/s if not improving

## 2018-04-06 NOTE — H&P ADULT - NSHPLABSRESULTS_GEN_ALL_CORE
16.1   17.4  )-----------( 195      ( 06 Apr 2018 02:41 )             47.5 16.1   17.4  )-----------( 195      ( 06 Apr 2018 02:41 )             47.5       04-06    136  |  95<L>  |  40<H>  ----------------------------<  139<H>  3.4<L>   |  24  |  1.82<H>    Ca    9.8      06 Apr 2018 02:41    TPro  7.9  /  Alb  3.3  /  TBili  1.6<H>  /  DBili  x   /  AST  32  /  ALT  46<H>  /  AlkPhos  113  04-06                      Lactate Trend            CAPILLARY BLOOD GLUCOSE 16.1   17.4  )-----------( 195      ( 06 Apr 2018 02:41 )             47.5       04-06    136  |  95<L>  |  40<H>  ----------------------------<  139<H>  3.4<L>   |  24  |  1.82<H>    Ca    9.8      06 Apr 2018 02:41    TPro  7.9  /  Alb  3.3  /  TBili  1.6<H>  /  DBili  x   /  AST  32  /  ALT  46<H>  /  AlkPhos  113  04-06                CT LEG:  Findings consistent with cellulitis throughout the imaged portion of the   left lower extremity with edema in the subcutaneous fat. Edema is   confluent and circumferential from the level of the mid tibia and fibula   to the level of the foot. Small cutaneous bleb along the dorsal lateral   aspect of the foot. No CT evidence of osteomyelitis. Limited evaluation   for abscess. No subcutaneous air.

## 2018-04-06 NOTE — CONSULT NOTE ADULT - ASSESSMENT
67 male with COPD, PAD, HTN here with left lower extremity pain. H/o bug bite. CT shows no air and Edema is confluent and circumferential from the level of the mid tibia and fibula to the level of the foot. Small cutaneous bleb along the dorsal lateral aspect of the foot. No CT evidence of osteomyelitis.   f/u blood cultures  continue vancomycin  continue clindamycin  start zosyn   surgery evaluation 67 male with COPD, PAD, HTN here with left lower extremity pain. H/o bug bite. CT shows no air and Edema is confluent and circumferential from the level of the mid tibia and fibula to the level of the foot. Small cutaneous bleb along the dorsal lateral aspect of the foot. No CT evidence of osteomyelitis.   f/u blood cultures  continue vancomycin  continue clindamycin  continue meropenem  surgery evaluation

## 2018-04-06 NOTE — CONSULT NOTE ADULT - SUBJECTIVE AND OBJECTIVE BOX
SICU Consultation Note  =====================================================  HPI: 67M s/p incision and debridement of left calf     HISTORY  67y Male  HPI:  67M with PMH COPD, PAD, HTN presenting with LLE pain (8/10), discoloration. Pt was in the [facility] and felt a bug bite on LLE on Sunday night. Pain began the next morning, and he started to have erythema in the proximal left thigh and left anterior thigh. Erythema and pain improved in the proximal LE, but worsened in distally to include the L foot. On Wednesday, pt was flying back to the , and noticed bullae formation on the L. foot, which he punctured w/ a toothpick, inducing yellow pus. Associated symptoms include: loss of appetite, cough w/ green sputum (since Sun night), feel hot/cold (Mon only), Of note, pt was using naproxen for pain and had contact w/ stray dogs in the [facility].    Pt denies any recent antibiotic use, hospitalizations, sick contacts, SOB, CP, palpitations, n/v/d/c.    In ED, vitals were T: 95.2 (improved to 98), HR: 62-92, BP: 105-119/ 62-72 RR; 18, SpO2: 98% RA  Pt was given 2L NS, IVF NS @ 100 mL/hr, Vancomycin 2g, Cefepime 1g, Clindamycin 600mg, Meropenem 1g  Labs were significant: WBC: 17.4, SCr: 1.82 (no baseline), TBili: 1.6, ESR: 66  CT scan: circumferential SQ edema in LLE, but no soft tissue gas, or foreign body   LLE X-rays: no soft tissue gas, no osteomyelitis (06 Apr 2018 08:16)    Allergies: All nuts (Anaphylaxis)  shellfish (Anaphylaxis)    PAST MEDICAL & SURGICAL HISTORY:  Migraine  Smoking addiction  Primary osteoarthritis of right hip  Seasonal allergies  Asthma: no h/o hospitalization or intubation  HTN (hypertension)  S/P hip replacement: right, 2012  Cataract: h/o Cataract surgery  4 yrs ago  Colon polyp: h/o colon polypectomy and partial colectomy 2007  Gall stones: h/o Cholecystectomy 2008    FAMILY HISTORY:      SOCIAL HISTORY:  ***    ADVANCE DIRECTIVES: Presumed Full Code  ***    REVIEW OF SYSTEMS:  ***  General: Non-Contributory  Skin/Breast: Non-Contributory  Ophthalmologic: Non-Contributory  ENMT: Non-Contributory  Respiratory and Thorax: Non-Contributory  Cardiovascular: Non-Contributory  Gastrointestinal: Non-Contributory  Genitourinary: Non-Contributory  Musculoskeletal: Non-Contributory  Neurological: Non-Contributory  Psychiatric: Non-Contributory  Hematology/Lymphatics: Non-Contributory  Endocrine: Non-Contributory  Allergic/Immunologic: Non-Contributory    HOME MEDICATIONS:  ***    CURRENT MEDICATIONS:   --------------------------------------------------------------------------------------  Neurologic Medications  HYDROmorphone  Injectable 1 milliGRAM(s) IV Push every 10 minutes PRN Severe Pain (7 - 10)  HYDROmorphone  Injectable 0.5 milliGRAM(s) IV Push every 10 minutes PRN Moderate Pain (4 - 6)    Respiratory Medications    Cardiovascular Medications    Gastrointestinal Medications    Genitourinary Medications    Hematologic/Oncologic Medications  heparin  Injectable 5000 Unit(s) SubCutaneous every 8 hours    Antimicrobial/Immunologic Medications  clindamycin IVPB 600 milliGRAM(s) IV Intermittent every 8 hours  meropenem  IVPB 1000 milliGRAM(s) IV Intermittent every 8 hours  vancomycin  IVPB 1000 milliGRAM(s) IV Intermittent every 8 hours    Endocrine/Metabolic Medications    Topical/Other Medications    --------------------------------------------------------------------------------------    VITAL SIGNS, INS/OUTS (last 24 hours):  --------------------------------------------------------------------------------------  ((Insert SICU Vitals / Is+Os here)) ***  --------------------------------------------------------------------------------------    EXAM  NEUROLOGY  RASS:   	GCS:    Exam: Normal, NAD, alert, oriented x 3, no focal deficits. ***    HEENT  Exam: Normocephalic, atraumatic.  EOMI ***    RESPIRATORY  Exam: Lungs clear to auscultation, Normal expansion/effort.  ***  Mechanical Ventilation:     CARDIOVASCULAR  Exam: S1, S2.  Regular rate and rhythm.  Peripheral edema  ***    GI/NUTRITION  Exam: Abdomen soft, Non-tender, Non-distended.  ***  Wound:   ***  Current Diet:  NPO ***    VASCULAR  Exam: Extremities warm, pink, well-perfused.  ***    MUSCULOSKELETAL  Exam: All extremities moving spontaneously without limitations.  ***    SKIN:  Exam: Good skin turgor, no skin breakdown.  ***    METABOLIC/FLUIDS/ELECTROLYTES      HEMATOLOGIC  [x] DVT Prophylaxis: heparin  Injectable 5000 Unit(s) SubCutaneous every 8 hours    Transfusions:	[] PRBC	[] Platelets		[] FFP	[] Cryoprecipitate    INFECTIOUS DISEASE  Antimicrobials/Immunologic Medications:  clindamycin IVPB 600 milliGRAM(s) IV Intermittent every 8 hours  meropenem  IVPB 1000 milliGRAM(s) IV Intermittent every 8 hours  vancomycin  IVPB 1000 milliGRAM(s) IV Intermittent every 8 hours    Day #  	of    ***    Tubes/Lines/Drains  ***  [x] Peripheral IV  [] Central Venous Line     	[] R	[] L	[] IJ	[] Fem	[] SC	Date Placed:   [] Arterial Line		[] R	[] L	[] Fem	[] Rad	[] Ax	Date Placed:   [] PICC:         	[] Midline		[] Mediport  [] Urinary Catheter		Date Placed:     LABS  --------------------------------------------------------------------------------------  ((Insert SICU Labs here))***  --------------------------------------------------------------------------------------    OTHER LABS    IMAGING RESULTS  Echo:   CT:   Xray:     ASSESSMENT:  67y Male ***    PLAN:  ***  Neurologic:   Respiratory:   Cardiovascular:   Gastrointestinal/Nutrition:   Renal/Genitourinary:   Hematologic:   Infectious Disease:   Tubes/Lines/Drains:   Endocrine:   Disposition:     --------------------------------------------------------------------------------------    Critical Care Diagnoses: SICU Consultation Note  =====================================================  HPI: 67M s/p incision and debridement of left calf, SICU consulted in the PACU for saturated dressings and relative hypotension.  Patient resting comfortably when examined with systolic in the 100s and HR in the 70s. Dressing currently c/d/i.   H&H in the PACU 12.4/36.9 from 16.1/47.5 pre operatively    HISTORY  67M with PMH COPD, PAD, HTN presenting with LLE pain (8/10), discoloration. Pt was in the [facility] and felt a bug bite on LLE on Sunday night. Pain began the next morning, and he started to have erythema in the proximal left thigh and left anterior thigh. Erythema and pain improved in the proximal LE, but worsened in distally to include the L foot. On Wednesday, pt was flying back to the , and noticed bullae formation on the L. foot, which he punctured w/ a toothpick, inducing yellow pus. Associated symptoms include: loss of appetite, cough w/ green sputum (since Sun night), feel hot/cold (Mon only), Of note, pt was using naproxen for pain and had contact w/ stray dogs in the [facility].    Pt denies any recent antibiotic use, hospitalizations, sick contacts, SOB, CP, palpitations, n/v/d/c.    In ED, vitals were T: 95.2 (improved to 98), HR: 62-92, BP: 105-119/ 62-72 RR; 18, SpO2: 98% RA  Pt was given 2L NS, IVF NS @ 100 mL/hr, Vancomycin 2g, Cefepime 1g, Clindamycin 600mg, Meropenem 1g  Labs were significant: WBC: 17.4, SCr: 1.82 (no baseline), TBili: 1.6, ESR: 66  CT scan: circumferential SQ edema in LLE, but no soft tissue gas, or foreign body   LLE X-rays: no soft tissue gas, no osteomyelitis (06 Apr 2018 08:16)    Allergies: All nuts (Anaphylaxis)  shellfish (Anaphylaxis)    PAST MEDICAL & SURGICAL HISTORY:  Migraine  Smoking addiction  Primary osteoarthritis of right hip  Seasonal allergies  Asthma: no h/o hospitalization or intubation  HTN (hypertension)  S/P hip replacement: right, 2012  Cataract: h/o Cataract surgery  4 yrs ago  Colon polyp: h/o colon polypectomy and partial colectomy 2007  Gall stones: h/o Cholecystectomy 2008    FAMILY HISTORY:      SOCIAL HISTORY:     ADVANCE DIRECTIVES: Presumed Full Code     REVIEW OF SYSTEMS:   General: Non-Contributory  Skin/Breast: Non-Contributory  Ophthalmologic: Non-Contributory  ENMT: Non-Contributory  Respiratory and Thorax: Non-Contributory  Cardiovascular: Non-Contributory  Gastrointestinal: Non-Contributory  Genitourinary: Non-Contributory  Musculoskeletal: Non-Contributory  Neurological: Non-Contributory  Psychiatric: Non-Contributory  Hematology/Lymphatics: Non-Contributory  Endocrine: Non-Contributory  Allergic/Immunologic: Non-Contributory    HOME MEDICATIONS:  ***    CURRENT MEDICATIONS:   --------------------------------------------------------------------------------------  Neurologic Medications  HYDROmorphone  Injectable 1 milliGRAM(s) IV Push every 10 minutes PRN Severe Pain (7 - 10)  HYDROmorphone  Injectable 0.5 milliGRAM(s) IV Push every 10 minutes PRN Moderate Pain (4 - 6)    Respiratory Medications    Cardiovascular Medications    Gastrointestinal Medications    Genitourinary Medications    Hematologic/Oncologic Medications  heparin  Injectable 5000 Unit(s) SubCutaneous every 8 hours    Antimicrobial/Immunologic Medications  clindamycin IVPB 600 milliGRAM(s) IV Intermittent every 8 hours  meropenem  IVPB 1000 milliGRAM(s) IV Intermittent every 8 hours  vancomycin  IVPB 1000 milliGRAM(s) IV Intermittent every 8 hours    Endocrine/Metabolic Medications    Topical/Other Medications    --------------------------------------------------------------------------------------    VITAL SIGNS, INS/OUTS (last 24 hours):  --------------------------------------------------------------------------------------  T(C): 36 (04-06-18 @ 23:30), Max: 36.9 (04-06-18 @ 14:11)  HR: 72 (04-06-18 @ 23:30) (62 - 92)  BP: 94/55 (04-06-18 @ 23:30) (91/52 - 122/76)  BP(mean): 68 (04-06-18 @ 23:30) (65 - 83)  ABP: --  ABP(mean): --  RR: 16 (04-06-18 @ 23:30) (16 - 20)  SpO2: 99% (04-06-18 @ 23:30) (92% - 100%)  Wt(kg): --  CVP(mm Hg): --  CI: --  CAPILLARY BLOOD GLUCOSE       N/A      04-06 @ 07:01  -  04-06 @ 23:51  --------------------------------------------------------  IN:    IV PiggyBack: 100 mL    Oral Fluid: 340 mL    Sodium Chloride 0.9% IV Bolus: 500 mL  Total IN: 940 mL    OUT:    Voided: 1000 mL  Total OUT: 1000 mL    Total NET: -60 mL        --------------------------------------------------------------------------------------    EXAM  NEUROLOGY  RASS:   	GCS:    Exam: Normal, NAD, alert, oriented x 3, no focal deficits.   HEENT  Exam: Normocephalic, atraumatic.  EOMI  RESPIRATORY  Exam: Lungs clear to auscultation, Normal expansion/effort.   CARDIOVASCULAR  Exam: S1, S2.  Regular rate and rhythm.  Peripheral edema   GI/NUTRITION  Exam: Abdomen soft, Non-tender, Non-distended. NPO  VASCULAR  Exam: Extremities warm, pink, well-perfused.    MUSCULOSKELETAL  Exam: All extremities moving spontaneously without limitations. Left leg with kerlex, clean with evidence of dried blood beneath.   SKIN:  Exam: Good skin turgor, no skin breakdown.      METABOLIC/FLUIDS/ELECTROLYTES      HEMATOLOGIC  [x] DVT Prophylaxis: heparin  Injectable 5000 Unit(s) SubCutaneous every 8 hours    Transfusions:	[] PRBC	[] Platelets		[] FFP	[] Cryoprecipitate    INFECTIOUS DISEASE  Antimicrobials/Immunologic Medications:  clindamycin IVPB 600 milliGRAM(s) IV Intermittent every 8 hours  meropenem  IVPB 1000 milliGRAM(s) IV Intermittent every 8 hours  vancomycin  IVPB 1000 milliGRAM(s) IV Intermittent every 8 hours    Day #  	of    ***    Tubes/Lines/Drains  ***  [x] Peripheral IV  [] Central Venous Line     	[] R	[] L	[] IJ	[] Fem	[] SC	Date Placed:   [] Arterial Line		[] R	[] L	[] Fem	[] Rad	[] Ax	Date Placed:   [] PICC:         	[] Midline		[] Mediport  [] Urinary Catheter		Date Placed:     LABS  --------------------------------------------------------------------------------------  CBC Full  -  ( 06 Apr 2018 21:45 )  WBC Count : 12.2 K/uL  Hemoglobin : 12.4 g/dL  Hematocrit : 36.9 %  Platelet Count - Automated : 154 K/uL  Mean Cell Volume : 95.6 fl  Mean Cell Hemoglobin : 32.2 pg  Mean Cell Hemoglobin Concentration : 33.7 gm/dL  Auto Neutrophil # : x  Auto Lymphocyte # : x  Auto Monocyte # : x  Auto Eosinophil # : x  Auto Basophil # : x  Auto Neutrophil % : x  Auto Lymphocyte % : x  Auto Monocyte % : x  Auto Eosinophil % : x  Auto Basophil % : x    04-06    133<L>  |  97  |  41<H>  ----------------------------<  196<H>  3.8   |  24  |  1.35<H>    Ca    8.2<L>      06 Apr 2018 21:45    TPro  7.9  /  Alb  3.3  /  TBili  1.6<H>  /  DBili  x   /  AST  32  /  ALT  46<H>  /  AlkPhos  113  04-06    LIVER FUNCTIONS - ( 06 Apr 2018 02:41 )  Alb: 3.3 g/dL / Pro: 7.9 g/dL / ALK PHOS: 113 U/L / ALT: 46 U/L RC / AST: 32 U/L / GGT: x           CAPILLARY BLOOD GLUCOSE            --------------------------------------------------------------------------------------    OTHER LABS    IMAGING RESULTS  Echo:   CT:   Xray:     ASSESSMENT:  67M presented with necrotizing soft tissue infection now s/p debridement     PLAN:    Neurologic: No concerns, patient alert and orietned  Respiratory: saturating well on nasal cannula in no distress  Cardiovascular: Hemodynamically stable  Gastrointestinal/Nutrition: diet as per primary team  Renal/Genitourinary: strict Is and Os, fluids as per team  Hematologic: trend H&H in the am  Infectious Disease: no infectious concerns  Tubes/Lines/Drains: PIV  Endocrine: no concerns  Disposition: floor when team feels appropriate    --------------------------------------------------------------------------------------    Critical Care Diagnoses:

## 2018-04-06 NOTE — CONSULT NOTE ADULT - SUBJECTIVE AND OBJECTIVE BOX
Infectious Diseases Consult note  Patient is a 67y old  Male who presents with a chief complaint of LLE cellulitis (06 Apr 2018 08:16)    GEMINI ZAYAS  MRN-[id number]  HPI:  67 male with COPD, PAD, HTN presenting with LLE pain (8/10), discoloration. Pt was in the [facility] and felt a bug bite on LLE on Sunday night. Pain began the next morning, and he started to have erythema in the proximal left thigh and left anterior thigh. Erythema and pain improved in the proximal LE, but worsened in distal. On Wednesday, pt was flying back to the , and noticed bullae formation on the L. foot, which he punctured w/ a toothpick, inducing yellow pus. Pt denies any fever, chills, and SOB. No sick contacts. He got 1 dose of vancomycin, meropenem, clindamycin, cefepime in the ed.       REVIEW OF SYSTEMS  All as below unless noted otherwise    General:  Denies fever and chills. 	  Ophthalmologic: Denies any visual complaints. 	  ENMT: No throat pain.  Respiratory: No cough, sputum. No shortness of breath.   Cardiovascular: No chest pain.   Gastrointestinal: No nausea or vomiting. No abdominal pain or diarrhea.   Genitourinary: No burning urine, no frequency. No flank pain  Musculoskeletal: No joint swelling or pain.   Neurological: No confusion. 	  Skin: No rash    PAST MEDICAL & SURGICAL HISTORY:  Smoking addiction  COPD  PAD  HTN  Primary osteoarthritis of right hip  Seasonal allergies  Asthma: no h/o hospitalization or intubation  HTN (hypertension)  S/P hip replacement: right  Cataract: h/o Cataract surgery  4 yrs ago  Colon polyp: h/o colon polypectomy and partial colectomy 2007  Gall stones: h/o Cholecystectomy 2008    FAMILY HISTORY:   COPD in father  GI bleed in mother     SOCIAL HISTORY:  former smoker      ANTIMICROBIALS:    got 1 dose of vancomycin, meropenem, clindamycin, cefepime    OTHER MEDS:    heparin  Injectable 5000 Unit(s) SubCutaneous every 8 hours  sodium chloride 0.9%. 1000 milliLiter(s) IV Continuous <Continuous>    Allergies  All nuts (Anaphylaxis)  penicillin (Anaphylaxis)  shellfish (Anaphylaxis)      Vital Signs Last 24 Hrs  T(C): 36.4 (06 Apr 2018 09:26), Max: 36.8 (06 Apr 2018 07:30)  T(F): 97.6 (06 Apr 2018 09:26), Max: 98.2 (06 Apr 2018 07:30)  HR: 77 (06 Apr 2018 09:26) (62 - 92)  BP: 108/69 (06 Apr 2018 09:26) (105/62 - 116/72)  RR: 18 (06 Apr 2018 09:26) (16 - 18)  SpO2: 98% (06 Apr 2018 09:26) (94% - 98%)      PHYSICAL EXAM:  patient in no acute respiratory distress.  Constitutional: Comfortable. Awake and alert  Eyes: PERRL EOMI. No discharge.  ENMT: No sinus tenderness.  No pharyngeal erythema.   Neck: Supple. No thyromegaly   Respiratory: Good air entry bilaterally, no wheezes, rhonchi, or crackles  Cardiovascular:S1 S2 wnl, No murmurs  Gastrointestinal: Soft, no tenderness , bowel sounds present,   Genitourinary: No suprapubic tenderness. no CVA tenderness   Musculoskeletal: No joint swelling. No edema.  Vascular: peripheral pulses felt  Neurological: No grossly focal deficits  Skin: No rash   Lymph Nodes: No palpable Lymphadenopathy   Psychiatric: Affect normal  Lines:                           16.1   17.4  )-----------( 195      ( 06 Apr 2018 02:41 )             47.5     WBC Count: 17.4 (04-06 @ 02:41)    04-06    136  |  95<L>  |  40<H>  ----------------------------<  139<H>  3.4<L>   |  24  |  1.82<H>    Ca    9.8      06 Apr 2018 02:41    TPro  7.9  /  Alb  3.3  /  TBili  1.6<H>  /  DBili  x   /  AST  32  /  ALT  46<H>  /  AlkPhos  113  04-06    Blood Gas Venous - Lactate: 2.8 mmoL/L (04.06.18 @ 02:41)  Sedimentation Rate, Erythrocyte: 66 mm/hr (04.06.18 @ 02:41)  C-Reactive Protein, Serum: 26.90: Test Repeated mg/dL (04.06.18 @ 07:30)    MICROBIOLOGY:  Blood and urine cultures pending.      RADIOLOGY:    < from: CT 3D Reconstruct w/ Workstation (04.06.18 @ 04:57) >  There is no acute fracture or dislocation. There is no osseous erosion,   destruction, or periosteal reaction suggest osteomyelitis. There is mild   tricompartmental arthrosis of the knee. There is trace knee joint fluid.   There is an os trigonum with moderate to severe degeneration across the   synchondrosis. There is mild hallux valgus with mild first   metatarsophalangeal and hallux sesamoid joint arthrosis.    There is circumferential edema within the subcutaneous fat throughout the   imaged portion of the lower extremity which is confluent from the level   of the mid tibia/fibula to the level of the ankle. A cutaneous bleb is   noted along the dorsal lateral aspect of the foot measuring approximately   1.9 cm x 0.2 cm. Findings are consistent with cellulitis. Although   limited by CT technique edema appears predominately confined to the   subcutaneous fat without infiltration of the deeper myofascial planes.   Evaluation for abscess is limited by the lack of intravenous contrast.   There is no subcutaneous air to suggest necrotizing fasciitis. No   radiopaque foreign body is demonstrated. There is no fatty atrophy of the   visualized musculature.    Impression:    Findings consistent with cellulitis throughout the imaged portion of the   left lower extremity with edema in the subcutaneous fat. Edema is   confluent and circumferential from the level of the mid tibia and fibula   to the level of the foot. Small cutaneous bleb along the dorsal lateral   aspect of the foot. No CT evidence of osteomyelitis. Limited evaluation   for abscess. No subcutaneous air.    < from: Xray Foot AP + Lateral, Left (04.06.18 @ 02:54) >  There are no acute fractures or dislocations of the left tibia/fibula,   ankle, or foot. No osseous erosions or periosteal reaction are   visualized. The joint spaces of the left knee, ankle, and foot are   maintained. Generalized soft tissue swelling of the left lower leg and   foot without evidence of soft tissue defects or subcutaneous gas.    IMPRESSION:    1.  No radiographic evidence for osteomyelitis in the left lower leg and   foot.  2.  Generalized soft tissue swelling without evidence of subcutaneous gas   or soft tissue defect. Infectious Diseases Consult note  Patient is a 67y old  Male who presents with a chief complaint of LLE cellulitis (06 Apr 2018 08:16)    GEMINI ZAYAS  MRN-[id number]  HPI:  67 male with COPD, PAD, HTN presenting with LLE pain (8/10), discoloration. Pt was in the [facility] on vacation to visit his wife's family and his left leg started becoming red and swollen on Sunday night. Pain began the next morning, and he started to have erythema in the proximal left thigh and left anterior thigh. Erythema and pain improved in the proximal LE, but worsened in distal. On Wednesday, pt was flying back to the , and noticed bullae formation on the L. foot, which he punctured w/ a toothpick, inducing yellow pus. Pt denies any fever, chills, and SOB. No sick contacts. He got 1 dose of vancomycin, meropenem, clindamycin, cefepime in the ed. NO confirmed insect bite.       REVIEW OF SYSTEMS  All as below unless noted otherwise    General:  Denies fever and chills. 	  Ophthalmologic: Denies any visual complaints. 	  ENMT: No throat pain.  Respiratory: No cough, sputum. No shortness of breath.   Cardiovascular: No chest pain.   Gastrointestinal: No nausea or vomiting. No abdominal pain or diarrhea.   Genitourinary: No burning urine, no frequency. No flank pain  Musculoskeletal: has left leg and foot pain, swelling and redness   Neurological: No confusion. 	  Skin: No rash    PAST MEDICAL & SURGICAL HISTORY:  Smoking addiction  COPD  PAD  HTN  Primary osteoarthritis of right hip  Seasonal allergies  Asthma: no h/o hospitalization or intubation  HTN (hypertension)  S/P hip replacement: right  Cataract: h/o Cataract surgery  4 yrs ago  Colon polyp: h/o colon polypectomy and partial colectomy 2007  Gall stones: h/o Cholecystectomy 2008    FAMILY HISTORY:   COPD in father  GI bleed in mother     SOCIAL HISTORY:    former smoker     retired postman     ANTIMICROBIALS:    got 1 dose of vancomycin, meropenem, clindamycin, cefepime    OTHER MEDS:    heparin  Injectable 5000 Unit(s) SubCutaneous every 8 hours  sodium chloride 0.9%. 1000 milliLiter(s) IV Continuous <Continuous>    Allergies  All nuts (Anaphylaxis)  penicillin (Anaphylaxis)  shellfish (Anaphylaxis)      Vital Signs Last 24 Hrs  T(C): 36.4 (06 Apr 2018 09:26), Max: 36.8 (06 Apr 2018 07:30)  T(F): 97.6 (06 Apr 2018 09:26), Max: 98.2 (06 Apr 2018 07:30)  HR: 77 (06 Apr 2018 09:26) (62 - 92)  BP: 108/69 (06 Apr 2018 09:26) (105/62 - 116/72)  RR: 18 (06 Apr 2018 09:26) (16 - 18)  SpO2: 98% (06 Apr 2018 09:26) (94% - 98%)      PHYSICAL EXAM:  patient in no acute respiratory distress.  Constitutional: Comfortable. Awake and alert- obese male  Eyes: PERRL EOMI. No discharge.  ENMT: No sinus tenderness.  No pharyngeal erythema.   Neck: Supple. No thyromegaly   Respiratory:  air entry bilaterally with some wheezes, no rhonchi, or crackles  Cardiovascular:S1 S2 wnl, No murmurs  Gastrointestinal: Soft, no tenderness , bowel sounds present,   Genitourinary: No suprapubic tenderness. no CVA tenderness   Musculoskeletal: Left leg swollen, warm, erythematous with one draining ulcer- yellow clear fluid  left foot on the dorsum seem to have some purulent fluid under the skin and swollen and very tender with some black and white tissue- concern for necrotic tissue, foul smelling, with areas of blood underneath that loose skin layer almost like a large bullae  Vascular: peripheral pulses felt  Neurological: No grossly focal deficits  Skin: see above  Lymph Nodes: No palpable Lymphadenopathy   Psychiatric: Affect normal  Lines:                           16.1   17.4  )-----------( 195      ( 06 Apr 2018 02:41 )             47.5     WBC Count: 17.4 (04-06 @ 02:41)    04-06    136  |  95<L>  |  40<H>  ----------------------------<  139<H>  3.4<L>   |  24  |  1.82<H>    Ca    9.8      06 Apr 2018 02:41    TPro  7.9  /  Alb  3.3  /  TBili  1.6<H>  /  DBili  x   /  AST  32  /  ALT  46<H>  /  AlkPhos  113  04-06    Blood Gas Venous - Lactate: 2.8 mmoL/L (04.06.18 @ 02:41)  Sedimentation Rate, Erythrocyte: 66 mm/hr (04.06.18 @ 02:41)  C-Reactive Protein, Serum: 26.90: Test Repeated mg/dL (04.06.18 @ 07:30)    MICROBIOLOGY:  Blood and urine cultures pending.      RADIOLOGY:    < from: CT 3D Reconstruct w/ Workstation (04.06.18 @ 04:57) >  There is no acute fracture or dislocation. There is no osseous erosion,   destruction, or periosteal reaction suggest osteomyelitis. There is mild   tricompartmental arthrosis of the knee. There is trace knee joint fluid.   There is an os trigonum with moderate to severe degeneration across the   synchondrosis. There is mild hallux valgus with mild first   metatarsophalangeal and hallux sesamoid joint arthrosis.    There is circumferential edema within the subcutaneous fat throughout the   imaged portion of the lower extremity which is confluent from the level   of the mid tibia/fibula to the level of the ankle. A cutaneous bleb is   noted along the dorsal lateral aspect of the foot measuring approximately   1.9 cm x 0.2 cm. Findings are consistent with cellulitis. Although   limited by CT technique edema appears predominately confined to the   subcutaneous fat without infiltration of the deeper myofascial planes.   Evaluation for abscess is limited by the lack of intravenous contrast.   There is no subcutaneous air to suggest necrotizing fasciitis. No   radiopaque foreign body is demonstrated. There is no fatty atrophy of the   visualized musculature.    Impression:    Findings consistent with cellulitis throughout the imaged portion of the   left lower extremity with edema in the subcutaneous fat. Edema is   confluent and circumferential from the level of the mid tibia and fibula   to the level of the foot. Small cutaneous bleb along the dorsal lateral   aspect of the foot. No CT evidence of osteomyelitis. Limited evaluation   for abscess. No subcutaneous air.    < from: Xray Foot AP + Lateral, Left (04.06.18 @ 02:54) >  There are no acute fractures or dislocations of the left tibia/fibula,   ankle, or foot. No osseous erosions or periosteal reaction are   visualized. The joint spaces of the left knee, ankle, and foot are   maintained. Generalized soft tissue swelling of the left lower leg and   foot without evidence of soft tissue defects or subcutaneous gas.    IMPRESSION:    1.  No radiographic evidence for osteomyelitis in the left lower leg and   foot.  2.  Generalized soft tissue swelling without evidence of subcutaneous gas   or soft tissue defect.

## 2018-04-06 NOTE — H&P ADULT - ATTENDING COMMENTS
67M with PMH COPD, PAD, HTN presenting with LLE pain (8/10), discoloration. Pt was in the [facility] and felt a bug bite on LLE on Sunday night. Pain began the next morning, and he started to have erythema in the proximal left thigh and left anterior thigh. Erythema and pain improved in the proximal LE, but worsened in distally to include the L foot. On Wednesday, pt was [place] back to the [place], and noticed bullae formation on the L. foot, which he punctured w/ a toothpick, inducing yellow pus. Associated symptoms include: loss of appetite, cough w/ green sputum (since Sun night), feel hot/cold (Mon only), Of note, pt was using naproxen for pain and had contact w/ stray dogs in the [facility].    Pt denies any recent antibiotic use, hospitalizations, sick contacts, SOB, CP, palpitations, n/v/d/c.  Remainder of ROS negative unless indicated in HPI.    I reviewed and agree with Past medical history, surgical history, social history, medications as written in resident /student note.    In ED, vitals were T: 95.2 (improved to 98), HR: 62-92, BP: 105-119/ 62-72 RR; 18, SpO2: 98% RA  Pt was given 2L NS, IVF NS @ 100 mL/hr, Vancomycin 2g, Cefepime 1g, Clindamycin 600mg, Meropenem 1g  Labs were significant: WBC: 17.4, SCr: 1.82 (no baseline), TBili: 1.6, ESR: 66  CT scan: circumferential SQ edema in LLE, but no soft tissue gas, or foreign body   LLE X-rays: no soft tissue gas, no osteomyelitis    I personally reviewed all labs and imaging as indicated in resident note.    A/P:  Severe sepsis, lower extremity cellulitis with bullae and concern for necrotic tissue. JUNAID on suspected CKD.  -continue vanc, meropenam, clinda  -I.D. consult appreciated  -pt to be transferred to surgery service for wound debridement.  -trend renal function; avoid nephrotoxins  -holding HCTZ for now in setting of sepsis

## 2018-04-06 NOTE — CONSULT NOTE ADULT - ATTENDING COMMENTS
pt evaluated with Dr Camilo in SICU  S/p foot wound debridement  Post op hypotense in RR, HCT stable  Responded to fluid bolus  HCT is stable  disposition as per primary

## 2018-04-06 NOTE — CONSULT NOTE ADULT - ATTENDING COMMENTS
seen and examined 4/6/2018 @ 8471    COPD and PVD from heavy cigarette usage, quit 2 years ago.    He was told that he had PCN allergy as a child, but subsequently had allergy testing and was told that he is not allergic to PCN.    right SOPHIE (12/14/2012 @ [facility])  states he had right SFA stent in 2014 for intermittent claudication, but I can't find a record in the computer or imaging to confirm.    LLE - triphasic doppler signals in popliteal / DP  RLE - monophasic doppler signals in popliteal / PT. no signal in DP. no tissue loss.    left foot necrotizing soft tissue infection  -to OR for emergent debridement  -I explained that he will be left with an open wound, will likely require additional surgery and depending on the extent of infection, he may require amputation    pre-renal stage 1 JUNAID  -IV hydration    This patient has severe sepsis with acute renal failure secondary to necrotizing soft tissue infection present on admission.

## 2018-04-06 NOTE — ED ADULT NURSE REASSESSMENT NOTE - NS ED NURSE REASSESS COMMENT FT1
Patient sitting up in bed. His antibiotics were finished infusing. He has NS infusing via pump. He is a&o4. States he has pain only with ambulation. Left leg has redness tracing up to just below his left knee. The top if his left foot has dark blister. Mild fluid coming from wound. He is admitted pending bed assignment. Denies SOB/chest pain/dizziness. Will continue to monitor.

## 2018-04-06 NOTE — ED PROVIDER NOTE - ATTENDING CONTRIBUTION TO CARE
Lesly Scott MD - Attending Physician: I have personally seen and examined this patient with the resident/fellow.  I have fully participated in the care of this patient. I have reviewed all pertinent clinical information, including history, physical exam, plan and the Resident/Fellow’s note and agree except as noted. See MDM

## 2018-04-06 NOTE — ED PROVIDER NOTE - MEDICAL DECISION MAKING DETAILS
68 y/o M w/ hx of COPD and HTN p/w LLE pain and swelling x 4 days, worsened today, will treat for cellulitis and admit. 68 y/o M w/ hx of COPD and HTN p/w LLE pain and swelling x 4 days, worsened today, will treat for cellulitis and admit.    Lesly Scott MD - Attending Physician: Pt here with significant cellultitis of left foot to knee with overlying blistering/necrosis of skin. Afebrile. Sensate. Abx, labs, cultures, to admit

## 2018-04-06 NOTE — H&P ADULT - PROBLEM SELECTOR PLAN 1
Pt p/w hypothermia, WBC 17, HR >90, and source cellulitis of LLE, with burst bullae, w/ organ dysfunction (JUNAID to 1.82). Lactate mildly elevated to 2.8.  S/p wilmer,clinda, vanco, cefepime, 2L NS in the ED  HD stable at this time  Plan:  Repeat VBG w/ lactate, will hold further IVF if normal  will start vanco/wilmer (renally adjusted)  ID c/s, f/u recs   F/u BCx Pt p/w hypothermia, WBC 17, HR >90, and source cellulitis of LLE, with burst bullae, w/ organ dysfunction (JUNAID to 1.82). Lactate mildly elevated to 2.8.  S/p wilmer, clinda, vanco, cefepime, 2L NS in the ED  HD stable at this time  Plan:  Repeat VBG w/ lactate, will hold further IVF if normal  will start vanco/wilmer/clinda  ID c/s, f/u recs   Surgery c/s for necrotic wound  F/u BCx

## 2018-04-06 NOTE — H&P ADULT - NSHPREVIEWOFSYSTEMS_GEN_ALL_CORE
REVIEW OF SYSTEMS:    CONSTITUTIONAL: +feeling hot/cold, No weakness, fevers,   EYES/ENT: No visual changes;  No vertigo or throat pain   NECK: No pain or stiffness  RESPIRATORY: No cough, wheezing, hemoptysis; No shortness of breath  CARDIOVASCULAR: No chest pain or palpitations  GASTROINTESTINAL: No abdominal or epigastric pain. No nausea, vomiting, or hematemesis; No diarrhea or constipation. No melena or hematochezia.  GENITOURINARY: No dysuria, frequency or hematuria  NEUROLOGICAL: No numbness or weakness  SKIN: +erythema, pain in LLE   All other review of systems is negative unless indicated above. REVIEW OF SYSTEMS:    CONSTITUTIONAL: +feeling hot/cold, No weakness, fevers,   EYES/ENT: No visual changes;  No vertigo or throat pain   NECK: No pain or stiffness  RESPIRATORY: + cough, no wheezing, hemoptysis; No shortness of breath  CARDIOVASCULAR: No chest pain or palpitations  GASTROINTESTINAL: No abdominal or epigastric pain. No nausea, vomiting, or hematemesis; No diarrhea or constipation. No melena or hematochezia.  GENITOURINARY: No dysuria, frequency or hematuria  MUSCULOSKELETAL: no arthalgias/myalgias  NEUROLOGICAL: No numbness or weakness  SKIN: +erythema, warmth, pain in LLE   All other review of systems is negative unless indicated above.

## 2018-04-06 NOTE — ED PROVIDER NOTE - CARE PLAN
Principal Discharge DX:	Cellulitis Principal Discharge DX:	Cellulitis and abscess of lower extremity

## 2018-04-06 NOTE — H&P ADULT - PROBLEM SELECTOR PLAN 4
Not in acute exacerbation at this time  Pt was taking singular 10mg at home, Advair 250/50 BID was ordered, but was never picked up from the pharmacy

## 2018-04-06 NOTE — H&P ADULT - PROBLEM SELECTOR PLAN 5
Pt taking propanolol 80mg ER daily  BP wnl, will monitor in the setting of severe sepsis Pt taking propanolol 80mg ER daily, HCTZ  BP wnl, will monitor in the setting of severe sepsis  Will hold HCTZ in setting of sepsis, JUNAID  Will start low dose BB Pt taking propanolol 80mg ER daily, HCTZ  BP wnl, will monitor in the setting of severe sepsis  Will hold HCTZ in setting of sepsis, JUNAID  Will restart propanol at 40 mg ER qd

## 2018-04-06 NOTE — H&P ADULT - ASSESSMENT
67M with a pmh of COPD, HTN presenting with LLE pain (8/10), edema, erythema, and weeping concerning for cellulitis. Pt denied any fever, chills, SOB, palpitations, n/v/d/c. Labs were significant for elevated WBCs, ESR, lactate, BUN, Cr, and bilirubin. Imaging (nc CT of LLE) revealed circumferential subcutaneous edema, but gas in the soft tissue, no foreign bodies, and no osteomyelitis (x-rays of the L ankle, L foot, L tibia). 67M with a pmh of COPD, HTN presenting with LLE pain, erythema recently in the [facility] presenting in severe sepsis 2/2 [facility] CT of LLE revealed circumferential subcutaneous edema, but gas in the soft tissue. On clinda/vanc/wilmer 67M with a hx of COPD, HTN presenting with LLE pain, erythema, edema recently in the [facility] presenting in severe sepsis 2/2 cellulitis. CT of LLE revealed circumferential subcutaneous edema, but no gas in the soft tissue. On clinda/vanc/wilmer

## 2018-04-06 NOTE — H&P ADULT - PROBLEM SELECTOR PLAN 3
P/w SCr 1.82, with unknown baseline   Etiology pre-renal in the setting of severe sepsis vs intra-renal d/t NSAID use  Plan:   Will contact PCP for baseline SCr   Repeat BMP stat  IVF @ 100mL/hr, will hold if lactate improves  Holding further NSAIDs  Avoid nephrotoxins  Trend BMP daily

## 2018-04-06 NOTE — H&P ADULT - PMH
Asthma  no h/o hospitalization or intubation  HTN (hypertension)    Primary osteoarthritis of right hip    Seasonal allergies    Smoking addiction Asthma  no h/o hospitalization or intubation  HTN (hypertension)    Migraine    Primary osteoarthritis of right hip    Seasonal allergies    Smoking addiction

## 2018-04-06 NOTE — ED ADULT NURSE NOTE - PMH
Asthma  no h/o hospitalization or intubation  HTN (hypertension)    Primary osteoarthritis of right hip    Seasonal allergies    Smoking addiction

## 2018-04-06 NOTE — CONSULT NOTE ADULT - ATTENDING COMMENTS
67 year old with COPD presents with pain to the left foot/ leg that has progressed over the past few days..  He recently traveled to the [facility]- but denies trauma or unusual water exposure.     His leg became red on Sunday with Fever/ chills on Monday to Tuesday.  The area of concern became larger on Wednesday (while flying home from the [facility]) Yesterday the wound to his foot became black.    He has a left foot/ lower extremity cellulitis with superficial necrosis.  No evidence of necrotizing fascitis on CT but I am still concerned by his skin changes and pain on exam    He needs a surgical evaluation.  Continue vancomycin/ meropenem for now.    Okay to continue clindamycin until necrotizing infection is ruled out.     If wound is debrided, check deep tissue cultures.

## 2018-04-06 NOTE — BRIEF OPERATIVE NOTE - PROCEDURE
<<-----Click on this checkbox to enter Procedure Debridement of soft tissue of left lower leg  04/07/2018    Active  DLIA

## 2018-04-06 NOTE — ED PROVIDER NOTE - OBJECTIVE STATEMENT
66 y/o M w/ hx of COPD and HTN p/w LLE pain and swelling x 4 days, was in [facility] walking outside at night and felt a bite, the next day foot began to swell, worsened today, just arrived from [facility] this afternoon. No fever, chills, n/v/d.  primary care physician: Jayce Khan 68 y/o M w/ hx of COPD and HTN p/w LLE pain and swelling x 4 days, was in [facility] walking outside at night and felt a bite, the next day foot began to swell, worsened today, just arrived from [facility] this afternoon. No fever, chills, n/v/d.  primary care physician: Jayce Watson

## 2018-04-06 NOTE — ED PROVIDER NOTE - PHYSICAL EXAMINATION
Gen: NAD  Eyes:  sclerae white, no icterus  ENT: Moist mucous membranes. No exudates  Neck: supple, no LAD, mass or goiter, trachea midline  CV: RRR. Audible S1 and S2. No murmurs, rubs, gallops, S3, nor S4  Pulm: Clear to auscultation bilaterally. No wheezes, rales, or rhonchi  Abd: BS+, nondistended, No tenderness to palpation  Skin: Swelling and erythema LLE from just distal to knee to end of foot - blood blister on lateral aspect of foot  Psych: mood good, affect full range and congruent with mood.  Neurologic: AAOx3

## 2018-04-06 NOTE — CHART NOTE - NSCHARTNOTEFT_GEN_A_CORE
Surgery Post-Op Note    Pre-Op Dx: nec fasc  Procedure: debridement LLE  Surgeon: Dr. TEE Lazaro     Subjective:   Pt seen and examined at the bedside. Pt w/ no complaints. Pain controlled with medication. Patient hypotensive post op, responded to 500cc bolus.     Vital Signs Last 24 Hrs  T(C): 36.6 (06 Apr 2018 20:00), Max: 36.9 (06 Apr 2018 14:11)  T(F): 97.9 (06 Apr 2018 20:00), Max: 98.4 (06 Apr 2018 14:11)  HR: 67 (06 Apr 2018 22:00) (62 - 92)  BP: 100/56 (06 Apr 2018 22:00) (91/52 - 122/76)  BP(mean): 72 (06 Apr 2018 22:00) (65 - 74)  RR: 16 (06 Apr 2018 22:00) (16 - 20)  SpO2: 100% (06 Apr 2018 22:00) (92% - 100%)    Physical Exam:  General: NAD, resting comfortably in bed  Neuro: A/O x 3, no focal deficits  Pulmonary: Nonlabored breathing, no respiratory distress  Cardiovascular: NSR  Extremities: LLE bandage c,d,i.  Erythema extending proximal to bandage marked off, has not extended past initial line.          LABS:                        12.4   12.2  )-----------( 154      ( 06 Apr 2018 21:45 )             36.9     04-06    133<L>  |  97  |  41<H>  ----------------------------<  196<H>  3.8   |  24  |  1.35<H>    Ca    8.2<L>      06 Apr 2018 21:45    TPro  7.9  /  Alb  3.3  /  TBili  1.6<H>  /  DBili  x   /  AST  32  /  ALT  46<H>  /  AlkPhos  113  04-06      CAPILLARY BLOOD GLUCOSE          LIVER FUNCTIONS - ( 06 Apr 2018 02:41 )  Alb: 3.3 g/dL / Pro: 7.9 g/dL / ALK PHOS: 113 U/L / ALT: 46 U/L RC / AST: 32 U/L / GGT: x           ABO Interpretation: B (04-06 @ 02:43)        Radiology and Additional Studies:    Assessment:67y Male s/p debridement for nec fasc of LLE, hypotensive to 90s post op with downtrending H/H    Plan:  Diet: Reg  soft IVF  trend H/H  continue abx  Pain/nausea control PRN  Incentive spirometer/OOB/Ambulate

## 2018-04-06 NOTE — H&P ADULT - NSHPPHYSICALEXAM_GEN_ALL_CORE
general -   ELOY -   CV -   Resp -   GI -    -   MSK -   Skin -   Neuro -   Psych - PHYSICAL EXAM:    Constitutional: well nourished, in no acute distress  Eyes: sclarea white, no icterus, mild proptosis  ENMT: moist mucous membranes, uvula midline, no ecchymosis  Neck: supple, no JVD, LAD, mass or goiter, trachea midline  Respiratory: clear to auscultation b/l, no wheezes or crackles, + scattered rales/rhonchi throughout  Cardiovascular: RRR, normal S1, S2, no murmurs, rubs, or gallops, S3, or S4  Gastrointestinal: normoactive bowel sounds, nondistended, nontender  Genitourinary: no dysuria/hematuria, no urgency  Extremities/skin: swelling, erythema, warmth to touch LLE distally below the knee to the end of foot, blister on lateral and dorsal aspect of foot  Vascular: pulses intact throughout  Neurological: AOx3  Musculoskeletal: no myalgias, arthralgias  Psychiatric: normal mood, full affect congruent to mood

## 2018-04-06 NOTE — ED ADULT NURSE NOTE - OBJECTIVE STATEMENT
66 y/o male presents to ED via EMS c/o L sided foot discoloration and pain. Pt was visiting [facility] for 1 month and returned yesterday afternoon, [place] hospital [place] due to [place]. On Sunday, pt felt "like something bit me, I wasn't sure what it was" and began having redness beginning at the knee, spreading down to ankle in the days following. Reports fever on Sunday. He says Wednesday on flight home he noticed ankle/foot became blackened and yellow is some areas, blistered, and began weeping. Pain was severe and he found it difficult 66 y/o male presents to ED via EMS c/o L sided foot discoloration and pain. Pt was visiting [facility] for 1 month and returned Thursday at 4:30pm, did not want to go to hospital [place] due to insurance issues. On Sunday, pt felt "like something bit me, I wasn't sure what it was" and began having redness beginning at the knee, spreading down to ankle in the days following. Reports fever/chills, 1 episode of n/v on Sunday. He says Wednesday on flight home he noticed ankle/foot became gray and blistered, so he went to airplane bathroom and poked area with a tooth pick which drained yellow fluid. He said when he got home, he noticed foot became blackened and yellow is some areas, blistered, and began weeping. Pt was unable to walk due to severe pain. Also reports cough with greenish sputum that started Sunday. Denies SOB, chest pain, fever or chills currently, n/v/d. L leg appears reddened starting by knee, blackened/yellowed. One circular wound mid-shin noted and then large blistered area over foot. Yellow serosanguinous fluid draining. 20G IV placed by EMS, 18G IV placed here. Gross neuro intact. Safety and comfort provided.

## 2018-04-06 NOTE — H&P ADULT - PSH
Cataract  h/o Cataract surgery  4 yrs ago  Colon polyp  h/o colon polypectomy and partial colectomy 2007  Gall stones  h/o Cholecystectomy 2008  S/P hip replacement  right Cataract  h/o Cataract surgery  4 yrs ago  Colon polyp  h/o colon polypectomy and partial colectomy 2007  Gall stones  h/o Cholecystectomy 2008  S/P hip replacement  right, 2012

## 2018-04-06 NOTE — ED ADULT NURSE NOTE - PSH
Cataract  h/o Cataract surgery  4 yrs ago  Colon polyp  h/o colon polypectomy and partial colectomy 2007  Gall stones  h/o Cholecystectomy 2008  S/P hip replacement  right

## 2018-04-06 NOTE — CONSULT NOTE ADULT - ASSESSMENT
67 male with COPD, PAD, HTN here with left lower extremity pain. H/o bug bite. CT shows no air and Edema is confluent and circumferential from the level of the mid tibia and fibula to the level of the foot. Small cutaneous bleb along the dorsal lateral aspect of the foot. No CT evidence of osteomyelitis.   -Patient needs debridement of the left lower extremity  -Patient booked and consented for debridement of the left lower extrermity 67 male with COPD, PAD, HTN here with left lower extremity pain. H/o bug bite. CT shows no air and Edema is confluent and circumferential from the level of the mid tibia and fibula to the level of the foot. Small cutaneous bleb along the dorsal lateral aspect of the foot. No CT evidence of osteomyelitis.   -Patient needs debridement of the left lower extremity  -Patient booked and consented for debridement of the left lower extrermity  -Patient will be transferred to the surgical service  -NPO, IV fluids  -ID on board for antibiotics  -DVT prophylaxis

## 2018-04-07 LAB
ANION GAP SERPL CALC-SCNC: 12 MMOL/L — SIGNIFICANT CHANGE UP (ref 5–17)
ANION GAP SERPL CALC-SCNC: 13 MMOL/L — SIGNIFICANT CHANGE UP (ref 5–17)
BUN SERPL-MCNC: 30 MG/DL — HIGH (ref 7–23)
BUN SERPL-MCNC: 35 MG/DL — HIGH (ref 7–23)
CALCIUM SERPL-MCNC: 8.6 MG/DL — SIGNIFICANT CHANGE UP (ref 8.4–10.5)
CALCIUM SERPL-MCNC: 8.9 MG/DL — SIGNIFICANT CHANGE UP (ref 8.4–10.5)
CHLORIDE SERPL-SCNC: 98 MMOL/L — SIGNIFICANT CHANGE UP (ref 96–108)
CHLORIDE SERPL-SCNC: 99 MMOL/L — SIGNIFICANT CHANGE UP (ref 96–108)
CO2 SERPL-SCNC: 24 MMOL/L — SIGNIFICANT CHANGE UP (ref 22–31)
CO2 SERPL-SCNC: 26 MMOL/L — SIGNIFICANT CHANGE UP (ref 22–31)
CREAT SERPL-MCNC: 1.14 MG/DL — SIGNIFICANT CHANGE UP (ref 0.5–1.3)
CREAT SERPL-MCNC: 1.15 MG/DL — SIGNIFICANT CHANGE UP (ref 0.5–1.3)
GLUCOSE SERPL-MCNC: 139 MG/DL — HIGH (ref 70–99)
GLUCOSE SERPL-MCNC: 176 MG/DL — HIGH (ref 70–99)
GRAM STN FLD: SIGNIFICANT CHANGE UP
HCT VFR BLD CALC: 38.3 % — LOW (ref 39–50)
HCT VFR BLD CALC: 38.8 % — LOW (ref 39–50)
HGB BLD-MCNC: 13 G/DL — SIGNIFICANT CHANGE UP (ref 13–17)
HGB BLD-MCNC: 13 G/DL — SIGNIFICANT CHANGE UP (ref 13–17)
MAGNESIUM SERPL-MCNC: 2.3 MG/DL — SIGNIFICANT CHANGE UP (ref 1.6–2.6)
MCHC RBC-ENTMCNC: 32.3 PG — SIGNIFICANT CHANGE UP (ref 27–34)
MCHC RBC-ENTMCNC: 32.4 PG — SIGNIFICANT CHANGE UP (ref 27–34)
MCHC RBC-ENTMCNC: 33.5 GM/DL — SIGNIFICANT CHANGE UP (ref 32–36)
MCHC RBC-ENTMCNC: 33.8 GM/DL — SIGNIFICANT CHANGE UP (ref 32–36)
MCV RBC AUTO: 95.4 FL — SIGNIFICANT CHANGE UP (ref 80–100)
MCV RBC AUTO: 96.8 FL — SIGNIFICANT CHANGE UP (ref 80–100)
PHOSPHATE SERPL-MCNC: 2.4 MG/DL — LOW (ref 2.5–4.5)
PLATELET # BLD AUTO: 165 K/UL — SIGNIFICANT CHANGE UP (ref 150–400)
PLATELET # BLD AUTO: 207 K/UL — SIGNIFICANT CHANGE UP (ref 150–400)
POTASSIUM SERPL-MCNC: 3.5 MMOL/L — SIGNIFICANT CHANGE UP (ref 3.5–5.3)
POTASSIUM SERPL-MCNC: 3.7 MMOL/L — SIGNIFICANT CHANGE UP (ref 3.5–5.3)
POTASSIUM SERPL-SCNC: 3.5 MMOL/L — SIGNIFICANT CHANGE UP (ref 3.5–5.3)
POTASSIUM SERPL-SCNC: 3.7 MMOL/L — SIGNIFICANT CHANGE UP (ref 3.5–5.3)
RBC # BLD: 4.01 M/UL — LOW (ref 4.2–5.8)
RBC # BLD: 4.02 M/UL — LOW (ref 4.2–5.8)
RBC # FLD: 13.4 % — SIGNIFICANT CHANGE UP (ref 10.3–14.5)
RBC # FLD: 13.5 % — SIGNIFICANT CHANGE UP (ref 10.3–14.5)
SODIUM SERPL-SCNC: 134 MMOL/L — LOW (ref 135–145)
SODIUM SERPL-SCNC: 138 MMOL/L — SIGNIFICANT CHANGE UP (ref 135–145)
SPECIMEN SOURCE: SIGNIFICANT CHANGE UP
VANCOMYCIN TROUGH SERPL-MCNC: 12.3 UG/ML — SIGNIFICANT CHANGE UP (ref 10–20)
WBC # BLD: 13.9 K/UL — HIGH (ref 3.8–10.5)
WBC # BLD: 14.7 K/UL — HIGH (ref 3.8–10.5)
WBC # FLD AUTO: 13.9 K/UL — HIGH (ref 3.8–10.5)
WBC # FLD AUTO: 14.7 K/UL — HIGH (ref 3.8–10.5)

## 2018-04-07 PROCEDURE — 99232 SBSQ HOSP IP/OBS MODERATE 35: CPT | Mod: GC

## 2018-04-07 RX ORDER — PROPRANOLOL HCL 160 MG
80 CAPSULE, EXTENDED RELEASE 24HR ORAL DAILY
Qty: 0 | Refills: 0 | Status: DISCONTINUED | OUTPATIENT
Start: 2018-04-07 | End: 2018-04-13

## 2018-04-07 RX ORDER — DOCUSATE SODIUM 100 MG
100 CAPSULE ORAL
Qty: 0 | Refills: 0 | Status: DISCONTINUED | OUTPATIENT
Start: 2018-04-07 | End: 2018-04-12

## 2018-04-07 RX ORDER — IPRATROPIUM/ALBUTEROL SULFATE 18-103MCG
3 AEROSOL WITH ADAPTER (GRAM) INHALATION ONCE
Qty: 0 | Refills: 0 | Status: DISCONTINUED | OUTPATIENT
Start: 2018-04-07 | End: 2018-04-13

## 2018-04-07 RX ORDER — FLUTICASONE PROPIONATE AND SALMETEROL 50; 250 UG/1; UG/1
1 POWDER ORAL; RESPIRATORY (INHALATION)
Qty: 0 | Refills: 0 | Status: DISCONTINUED | OUTPATIENT
Start: 2018-04-07 | End: 2018-04-08

## 2018-04-07 RX ORDER — MONTELUKAST 4 MG/1
10 TABLET, CHEWABLE ORAL DAILY
Qty: 0 | Refills: 0 | Status: DISCONTINUED | OUTPATIENT
Start: 2018-04-07 | End: 2018-04-13

## 2018-04-07 RX ORDER — SENNA PLUS 8.6 MG/1
2 TABLET ORAL AT BEDTIME
Qty: 0 | Refills: 0 | Status: DISCONTINUED | OUTPATIENT
Start: 2018-04-07 | End: 2018-04-13

## 2018-04-07 RX ORDER — HYDROMORPHONE HYDROCHLORIDE 2 MG/ML
0.5 INJECTION INTRAMUSCULAR; INTRAVENOUS; SUBCUTANEOUS ONCE
Qty: 0 | Refills: 0 | Status: DISCONTINUED | OUTPATIENT
Start: 2018-04-07 | End: 2018-04-07

## 2018-04-07 RX ORDER — ACETAMINOPHEN 500 MG
1000 TABLET ORAL ONCE
Qty: 0 | Refills: 0 | Status: COMPLETED | OUTPATIENT
Start: 2018-04-07 | End: 2018-04-11

## 2018-04-07 RX ORDER — MORPHINE SULFATE 50 MG/1
4 CAPSULE, EXTENDED RELEASE ORAL EVERY 4 HOURS
Qty: 0 | Refills: 0 | Status: DISCONTINUED | OUTPATIENT
Start: 2018-04-07 | End: 2018-04-10

## 2018-04-07 RX ORDER — HYDROCHLOROTHIAZIDE 25 MG
12.5 TABLET ORAL DAILY
Qty: 0 | Refills: 0 | Status: DISCONTINUED | OUTPATIENT
Start: 2018-04-07 | End: 2018-04-13

## 2018-04-07 RX ADMIN — Medication 100 MILLIGRAM(S): at 21:19

## 2018-04-07 RX ADMIN — MORPHINE SULFATE 4 MILLIGRAM(S): 50 CAPSULE, EXTENDED RELEASE ORAL at 16:35

## 2018-04-07 RX ADMIN — Medication 250 MILLIGRAM(S): at 12:08

## 2018-04-07 RX ADMIN — SENNA PLUS 2 TABLET(S): 8.6 TABLET ORAL at 21:18

## 2018-04-07 RX ADMIN — Medication 12.5 MILLIGRAM(S): at 13:23

## 2018-04-07 RX ADMIN — MEROPENEM 100 MILLIGRAM(S): 1 INJECTION INTRAVENOUS at 04:01

## 2018-04-07 RX ADMIN — MONTELUKAST 10 MILLIGRAM(S): 4 TABLET, CHEWABLE ORAL at 21:18

## 2018-04-07 RX ADMIN — HYDROMORPHONE HYDROCHLORIDE 0.5 MILLIGRAM(S): 2 INJECTION INTRAMUSCULAR; INTRAVENOUS; SUBCUTANEOUS at 17:16

## 2018-04-07 RX ADMIN — Medication 250 MILLIGRAM(S): at 20:05

## 2018-04-07 RX ADMIN — Medication 100 MILLIGRAM(S): at 14:34

## 2018-04-07 RX ADMIN — MORPHINE SULFATE 4 MILLIGRAM(S): 50 CAPSULE, EXTENDED RELEASE ORAL at 16:50

## 2018-04-07 RX ADMIN — MEROPENEM 100 MILLIGRAM(S): 1 INJECTION INTRAVENOUS at 14:22

## 2018-04-07 RX ADMIN — HEPARIN SODIUM 5000 UNIT(S): 5000 INJECTION INTRAVENOUS; SUBCUTANEOUS at 14:22

## 2018-04-07 RX ADMIN — HEPARIN SODIUM 5000 UNIT(S): 5000 INJECTION INTRAVENOUS; SUBCUTANEOUS at 05:31

## 2018-04-07 RX ADMIN — MEROPENEM 100 MILLIGRAM(S): 1 INJECTION INTRAVENOUS at 21:19

## 2018-04-07 RX ADMIN — HYDROMORPHONE HYDROCHLORIDE 0.5 MILLIGRAM(S): 2 INJECTION INTRAMUSCULAR; INTRAVENOUS; SUBCUTANEOUS at 17:31

## 2018-04-07 RX ADMIN — Medication 250 MILLIGRAM(S): at 03:01

## 2018-04-07 RX ADMIN — Medication 80 MILLIGRAM(S): at 13:22

## 2018-04-07 RX ADMIN — HEPARIN SODIUM 5000 UNIT(S): 5000 INJECTION INTRAVENOUS; SUBCUTANEOUS at 21:18

## 2018-04-07 RX ADMIN — Medication 100 MILLIGRAM(S): at 18:21

## 2018-04-07 RX ADMIN — Medication 100 MILLIGRAM(S): at 07:03

## 2018-04-07 NOTE — PROGRESS NOTE ADULT - ASSESSMENT
67 male with COPD, PAD, HTN here with left lower extremity pain. H/o bug bite. CT shows no air and Edema is confluent and circumferential from the level of the mid tibia and fibula to the level of the foot, small cutaneous bleb along the dorsal lateral aspect of the foot. No CT evidence of osteomyelitis.  s/p debridement for concern for necrotizing fascitis of left foot 04/06.   Post op hypotension and drop in H/H but did not require transfusion.   Wound cultures GPC pairs on Gram stain    c/w vancomycin 1 g iv q8h  check trough prior to 4th dose and monitor renal function  c/w continue meropenem 1 g iv q8h (had reported allergy to penicillin but confirmed that he had allergy testing and is NOT allergic to penicillin)  c/w clindamycin  f/u blood cx, OR cx and pathology 67 male with COPD, PAD, HTN here with left lower extremity pain. H/o bug bite. CT shows no air and Edema is confluent and circumferential from the level of the mid tibia and fibula to the level of the foot, small cutaneous bleb along the dorsal lateral aspect of the foot. No CT evidence of osteomyelitis.  s/p debridement for concern for necrotizing fascitis of left foot 04/06.   Post op hypotension and drop in H/H but did not require transfusion.   Wound cultures GPC pairs on Gram stain    c/w vancomycin 1 g iv q8h- please follow levels  check trough prior to 4th dose and monitor renal function  c/w continue meropenem 1 g iv q8h (had reported allergy to penicillin but confirmed that he had allergy testing and is NOT allergic to penicillin)  c/w clindamycin  f/u blood cx, OR cx and pathology  will streamline abs when final cultures become available

## 2018-04-07 NOTE — PROGRESS NOTE ADULT - ASSESSMENT
67y M HD 2 POD 1 s/p Debridement of soft tissue of left lower leg due to necrotizing fasciitis.  Doing well; avss, however WBC continues to elevate (14.7 this afternoon, 4/7/18, up from 13.9 this AM and 12.2 in PACU; however down from ~20 on admission)    -Continue daily dressing changes; might need IV push morphine 2-4mg during change (vs IV push 0.5 dilaudid); consider woundvac w/ white sponge over tendons once wound begins to granulate.  -Will consult Plastics for wound-closure planning  -Will continue empiric IV Abx; clindamycin, meropenem, vancomycin while awaiting cultures.  -Patient ambulated today, 4/7/18; continue OOB/ambulation.  Will have PT consult for eval  -Dispo planning

## 2018-04-08 LAB
ANION GAP SERPL CALC-SCNC: 9 MMOL/L — SIGNIFICANT CHANGE UP (ref 5–17)
BUN SERPL-MCNC: 24 MG/DL — HIGH (ref 7–23)
CALCIUM SERPL-MCNC: 8.8 MG/DL — SIGNIFICANT CHANGE UP (ref 8.4–10.5)
CHLORIDE SERPL-SCNC: 98 MMOL/L — SIGNIFICANT CHANGE UP (ref 96–108)
CO2 SERPL-SCNC: 30 MMOL/L — SIGNIFICANT CHANGE UP (ref 22–31)
CREAT SERPL-MCNC: 1.11 MG/DL — SIGNIFICANT CHANGE UP (ref 0.5–1.3)
GLUCOSE SERPL-MCNC: 113 MG/DL — HIGH (ref 70–99)
HCT VFR BLD CALC: 37.6 % — LOW (ref 39–50)
HGB BLD-MCNC: 12.7 G/DL — LOW (ref 13–17)
MCHC RBC-ENTMCNC: 32.4 PG — SIGNIFICANT CHANGE UP (ref 27–34)
MCHC RBC-ENTMCNC: 33.8 GM/DL — SIGNIFICANT CHANGE UP (ref 32–36)
MCV RBC AUTO: 96 FL — SIGNIFICANT CHANGE UP (ref 80–100)
PLATELET # BLD AUTO: 224 K/UL — SIGNIFICANT CHANGE UP (ref 150–400)
POTASSIUM SERPL-MCNC: 3.7 MMOL/L — SIGNIFICANT CHANGE UP (ref 3.5–5.3)
POTASSIUM SERPL-SCNC: 3.7 MMOL/L — SIGNIFICANT CHANGE UP (ref 3.5–5.3)
RBC # BLD: 3.92 M/UL — LOW (ref 4.2–5.8)
RBC # FLD: 13.3 % — SIGNIFICANT CHANGE UP (ref 10.3–14.5)
SODIUM SERPL-SCNC: 137 MMOL/L — SIGNIFICANT CHANGE UP (ref 135–145)
TSH SERPL-MCNC: 0.98 UIU/ML — SIGNIFICANT CHANGE UP (ref 0.27–4.2)
WBC # BLD: 11.4 K/UL — HIGH (ref 3.8–10.5)
WBC # FLD AUTO: 11.4 K/UL — HIGH (ref 3.8–10.5)

## 2018-04-08 PROCEDURE — 99232 SBSQ HOSP IP/OBS MODERATE 35: CPT | Mod: GC

## 2018-04-08 RX ORDER — HYDROMORPHONE HYDROCHLORIDE 2 MG/ML
0.5 INJECTION INTRAMUSCULAR; INTRAVENOUS; SUBCUTANEOUS ONCE
Qty: 0 | Refills: 0 | Status: COMPLETED | OUTPATIENT
Start: 2018-04-08 | End: 2018-04-15

## 2018-04-08 RX ORDER — CEFTRIAXONE 500 MG/1
2 INJECTION, POWDER, FOR SOLUTION INTRAMUSCULAR; INTRAVENOUS EVERY 24 HOURS
Qty: 0 | Refills: 0 | Status: DISCONTINUED | OUTPATIENT
Start: 2018-04-08 | End: 2018-04-13

## 2018-04-08 RX ORDER — ENOXAPARIN SODIUM 100 MG/ML
40 INJECTION SUBCUTANEOUS DAILY
Qty: 0 | Refills: 0 | Status: DISCONTINUED | OUTPATIENT
Start: 2018-04-08 | End: 2018-04-13

## 2018-04-08 RX ORDER — BUDESONIDE AND FORMOTEROL FUMARATE DIHYDRATE 160; 4.5 UG/1; UG/1
2 AEROSOL RESPIRATORY (INHALATION)
Qty: 0 | Refills: 0 | Status: DISCONTINUED | OUTPATIENT
Start: 2018-04-08 | End: 2018-04-13

## 2018-04-08 RX ADMIN — MEROPENEM 100 MILLIGRAM(S): 1 INJECTION INTRAVENOUS at 12:51

## 2018-04-08 RX ADMIN — Medication 12.5 MILLIGRAM(S): at 05:18

## 2018-04-08 RX ADMIN — MORPHINE SULFATE 4 MILLIGRAM(S): 50 CAPSULE, EXTENDED RELEASE ORAL at 21:59

## 2018-04-08 RX ADMIN — Medication 80 MILLIGRAM(S): at 05:19

## 2018-04-08 RX ADMIN — MEROPENEM 100 MILLIGRAM(S): 1 INJECTION INTRAVENOUS at 05:17

## 2018-04-08 RX ADMIN — Medication 100 MILLIGRAM(S): at 05:17

## 2018-04-08 RX ADMIN — Medication 100 MILLIGRAM(S): at 18:00

## 2018-04-08 RX ADMIN — BUDESONIDE AND FORMOTEROL FUMARATE DIHYDRATE 2 PUFF(S): 160; 4.5 AEROSOL RESPIRATORY (INHALATION) at 22:24

## 2018-04-08 RX ADMIN — Medication 100 MILLIGRAM(S): at 21:32

## 2018-04-08 RX ADMIN — Medication 100 MILLIGRAM(S): at 05:18

## 2018-04-08 RX ADMIN — HEPARIN SODIUM 5000 UNIT(S): 5000 INJECTION INTRAVENOUS; SUBCUTANEOUS at 05:18

## 2018-04-08 RX ADMIN — Medication 100 MILLIGRAM(S): at 12:51

## 2018-04-08 RX ADMIN — MORPHINE SULFATE 4 MILLIGRAM(S): 50 CAPSULE, EXTENDED RELEASE ORAL at 21:29

## 2018-04-08 RX ADMIN — Medication 250 MILLIGRAM(S): at 04:28

## 2018-04-08 RX ADMIN — CEFTRIAXONE 100 GRAM(S): 500 INJECTION, POWDER, FOR SOLUTION INTRAMUSCULAR; INTRAVENOUS at 18:01

## 2018-04-08 RX ADMIN — MONTELUKAST 10 MILLIGRAM(S): 4 TABLET, CHEWABLE ORAL at 21:33

## 2018-04-08 RX ADMIN — ENOXAPARIN SODIUM 40 MILLIGRAM(S): 100 INJECTION SUBCUTANEOUS at 12:51

## 2018-04-08 NOTE — PROGRESS NOTE ADULT - ASSESSMENT
67y M HD 3 POD 2 s/p Debridement of soft tissue of left lower leg due to necrotizing fasciitis.  Doing well; avss, however WBC still elevated (11.4 this afternoon, 4/8/18, down from 13.9  yesterday)    -Continue daily dressing changes; might need IV push morphine 2-4mg during change (vs IV push 0.5 dilaudid); consider woundvac w/ white sponge over tendons once wound begins to granulate.  -Will consult woundcare this week for possible vac (white sponge)  -Cultures grew Group A Strep; per ID continue Clindamycin, switch Meropenem to Ceftriaxone and d/c vancomycin  -Continue OOB/ambulation.  -Dispo planning 67y M HD 3 POD 2 s/p Debridement of soft tissue of left lower leg due to necrotizing fasciitis.  Doing well; avss, however WBC still elevated (11.4 this afternoon, 4/8/18, down from 13.9  yesterday)    -Continue daily dressing changes; might need IV push morphine 2-4mg during change (vs IV push 0.5 dilaudid); consider woundvac w/ white sponge over tendons once wound begins to granulate.  -Will consult woundcare this week for possible vac (white sponge)  -Cultures grew Group A Strep; per ID continue Clindamycin (Day 3), switch Meropenem to Ceftriaxone (Day 1) and d/c vancomycin  -Continue OOB/ambulation.  -Dispo planning

## 2018-04-08 NOTE — PROGRESS NOTE ADULT - ASSESSMENT
67 male with COPD, PAD, HTN here with left lower extremity pain. H/o bug bite. CT shows no air and Edema is confluent and circumferential from the level of the mid tibia and fibula to the level of the foot. Small cutaneous bleb along the dorsal lateral aspect of the foot. No CT evidence of osteomyelitis.   Admitted for LLE necrotizing fascitis POd #2, Or culture growing Strep pyogenes    Afebrile, leukocytosis downtrending  On meropenem/vanco and clinda  Will dc vanco and likely switch wilmer to ceftriaxone today  f/u blood CX-NGTD 67 male with COPD, PAD, HTN here with left lower extremity pain. H/o bug bite. CT shows no air and Edema is confluent and circumferential from the level of the mid tibia and fibula to the level of the foot. Small cutaneous bleb along the dorsal lateral aspect of the foot. No CT evidence of osteomyelitis.   Admitted for LLE necrotizing fascitis POd #2, Or culture growing Strep pyogenes    Afebrile, leukocytosis downtrending  On meropenem/vanco and clinda  Will dc vanco and suggest switch meropenem to Rocephin today ( has tolerated cephalosporins during this hospitalization)    f/u blood CX-NGTD  surgery to continue to monitor wound closely

## 2018-04-09 DIAGNOSIS — Z29.9 ENCOUNTER FOR PROPHYLACTIC MEASURES, UNSPECIFIED: ICD-10-CM

## 2018-04-09 DIAGNOSIS — M72.6 NECROTIZING FASCIITIS: ICD-10-CM

## 2018-04-09 LAB
ANION GAP SERPL CALC-SCNC: 11 MMOL/L — SIGNIFICANT CHANGE UP (ref 5–17)
BUN SERPL-MCNC: 20 MG/DL — SIGNIFICANT CHANGE UP (ref 7–23)
CALCIUM SERPL-MCNC: 9.1 MG/DL — SIGNIFICANT CHANGE UP (ref 8.4–10.5)
CHLORIDE SERPL-SCNC: 99 MMOL/L — SIGNIFICANT CHANGE UP (ref 96–108)
CO2 SERPL-SCNC: 29 MMOL/L — SIGNIFICANT CHANGE UP (ref 22–31)
CREAT SERPL-MCNC: 0.98 MG/DL — SIGNIFICANT CHANGE UP (ref 0.5–1.3)
GLUCOSE SERPL-MCNC: 92 MG/DL — SIGNIFICANT CHANGE UP (ref 70–99)
HCT VFR BLD CALC: 38.8 % — LOW (ref 39–50)
HGB BLD-MCNC: 13.4 G/DL — SIGNIFICANT CHANGE UP (ref 13–17)
MCHC RBC-ENTMCNC: 32.6 PG — SIGNIFICANT CHANGE UP (ref 27–34)
MCHC RBC-ENTMCNC: 34.4 GM/DL — SIGNIFICANT CHANGE UP (ref 32–36)
MCV RBC AUTO: 94.8 FL — SIGNIFICANT CHANGE UP (ref 80–100)
PLATELET # BLD AUTO: 287 K/UL — SIGNIFICANT CHANGE UP (ref 150–400)
POTASSIUM SERPL-MCNC: 3.9 MMOL/L — SIGNIFICANT CHANGE UP (ref 3.5–5.3)
POTASSIUM SERPL-SCNC: 3.9 MMOL/L — SIGNIFICANT CHANGE UP (ref 3.5–5.3)
RBC # BLD: 4.09 M/UL — LOW (ref 4.2–5.8)
RBC # FLD: 13.4 % — SIGNIFICANT CHANGE UP (ref 10.3–14.5)
SODIUM SERPL-SCNC: 139 MMOL/L — SIGNIFICANT CHANGE UP (ref 135–145)
WBC # BLD: 14.3 K/UL — HIGH (ref 3.8–10.5)
WBC # FLD AUTO: 14.3 K/UL — HIGH (ref 3.8–10.5)

## 2018-04-09 PROCEDURE — 99232 SBSQ HOSP IP/OBS MODERATE 35: CPT

## 2018-04-09 PROCEDURE — 99233 SBSQ HOSP IP/OBS HIGH 50: CPT

## 2018-04-09 RX ORDER — SODIUM HYPOCHLORITE 0.125 %
1 SOLUTION, NON-ORAL MISCELLANEOUS DAILY
Qty: 0 | Refills: 0 | Status: DISCONTINUED | OUTPATIENT
Start: 2018-04-09 | End: 2018-04-13

## 2018-04-09 RX ORDER — POTASSIUM CHLORIDE 20 MEQ
10 PACKET (EA) ORAL ONCE
Qty: 0 | Refills: 0 | Status: COMPLETED | OUTPATIENT
Start: 2018-04-09 | End: 2018-04-09

## 2018-04-09 RX ADMIN — ENOXAPARIN SODIUM 40 MILLIGRAM(S): 100 INJECTION SUBCUTANEOUS at 12:42

## 2018-04-09 RX ADMIN — Medication 12.5 MILLIGRAM(S): at 05:58

## 2018-04-09 RX ADMIN — Medication 80 MILLIGRAM(S): at 05:58

## 2018-04-09 RX ADMIN — Medication 100 MILLIGRAM(S): at 12:45

## 2018-04-09 RX ADMIN — Medication 100 MILLIGRAM(S): at 21:35

## 2018-04-09 RX ADMIN — Medication 100 MILLIGRAM(S): at 05:57

## 2018-04-09 RX ADMIN — Medication 10 MILLIEQUIVALENT(S): at 12:42

## 2018-04-09 RX ADMIN — CEFTRIAXONE 100 GRAM(S): 500 INJECTION, POWDER, FOR SOLUTION INTRAMUSCULAR; INTRAVENOUS at 17:14

## 2018-04-09 RX ADMIN — MORPHINE SULFATE 4 MILLIGRAM(S): 50 CAPSULE, EXTENDED RELEASE ORAL at 17:49

## 2018-04-09 RX ADMIN — MONTELUKAST 10 MILLIGRAM(S): 4 TABLET, CHEWABLE ORAL at 21:34

## 2018-04-09 RX ADMIN — BUDESONIDE AND FORMOTEROL FUMARATE DIHYDRATE 2 PUFF(S): 160; 4.5 AEROSOL RESPIRATORY (INHALATION) at 05:57

## 2018-04-09 RX ADMIN — BUDESONIDE AND FORMOTEROL FUMARATE DIHYDRATE 2 PUFF(S): 160; 4.5 AEROSOL RESPIRATORY (INHALATION) at 17:14

## 2018-04-09 RX ADMIN — MORPHINE SULFATE 4 MILLIGRAM(S): 50 CAPSULE, EXTENDED RELEASE ORAL at 18:19

## 2018-04-09 NOTE — PHYSICAL THERAPY INITIAL EVALUATION ADULT - PERTINENT HX OF CURRENT PROBLEM, REHAB EVAL
67 y.o. M p/w LLE pain and discoloration. Pt was in the [facility] and felt a bug bite on LLE on Sunday night. Pain began the next morning, and he started to have erythema in the proximal left thigh and left anterior thigh. Erythema and pain improved in the proximal LE, but worsened in distally to include the L foot. On Wednesday, pt was flying back to the , and noticed bullae formation on the L. foot, which he punctured w/ a toothpick, inducing yellow pus.

## 2018-04-09 NOTE — PROGRESS NOTE ADULT - ASSESSMENT
66 y/o M PMH COPD, PAD, HTN p/w chills/LLE pain, bullae adm for severe sepsis 2/2 GAS necrotizing fasciitis s/p debridement 4/6, w/ course c/b JUNAID (now resolved).

## 2018-04-09 NOTE — PHYSICAL THERAPY INITIAL EVALUATION ADULT - BALANCE TRAINING, PT EVAL
GOAL: Pt will increase static/ dynamic standing balance to Good- with RW to improve safety with functional activities in 4 weeks.

## 2018-04-09 NOTE — PHYSICAL THERAPY INITIAL EVALUATION ADULT - PRECAUTIONS/LIMITATIONS, REHAB EVAL
fall precautions/CONT: Found to have evere sepsis 2/2 GAS necrotizing fasciitis.  Pt underwent Debridement of the skin and subcutaneous tissue and fascia of left leg,  Fasciotomies of left foot interosseous compartments, and Fasciotomy of left leg anterior compartment on 4/6.

## 2018-04-09 NOTE — CONSULT NOTE ADULT - SUBJECTIVE AND OBJECTIVE BOX
Plastic Surgery Consult Note  ([phone])    HPI:  67M with PMH COPD, PAD, HTN presenting with LLE pain (8/10), discoloration. Pt was in the [facility] and felt a bug bite on LLE on Sunday night. Pain began the next morning, and he started to have erythema in the proximal left thigh and left anterior thigh. Erythema and pain improved in the proximal LE, but worsened in distally to include the L foot. On Wednesday, pt was flying back to the , and noticed bullae formation on the L. foot, which he punctured w/ a toothpick, inducing yellow pus. Associated symptoms include: loss of appetite, cough w/ green sputum (since Sun night), feel hot/cold (Mon only), Of note, pt was using naproxen for pain and had contact w/ stray dogs in the [facility]. Now s/p wide debridement. Asked to evaluate patient for wound management        CT scan: circumferential SQ edema in LLE, but no soft tissue gas, or foreign body   LLE X-rays: no soft tissue gas, no osteomyelitis (06 Apr 2018 08:16)      PAST MEDICAL & SURGICAL HISTORY:  Migraine  Smoking addiction  Primary osteoarthritis of right hip  Seasonal allergies  Asthma: no h/o hospitalization or intubation  HTN (hypertension)  S/P hip replacement: right, 2012  Cataract: h/o Cataract surgery  4 yrs ago  Colon polyp: h/o colon polypectomy and partial colectomy 2007  Gall stones: h/o Cholecystectomy 2008      Allergies    All nuts (Anaphylaxis)  No Known Drug Allergies  shellfish (Anaphylaxis)        Home Medications:  Advair Diskus 250 mcg-50 mcg inhalation powder: 1 puff(s) inhaled 2 times a day (06 Apr 2018 11:52)  hydroCHLOROthiazide 12.5 mg oral tablet: 1 tab(s) orally once a day (06 Apr 2018 11:53)  propranolol 80 mg oral capsule, extended release: 1 cap(s) orally once a day (06 Apr 2018 11:53)  Singulair 10 mg oral tablet: 1 tab(s) orally once a day (06 Apr 2018 11:53)      MEDICATIONS  (STANDING):  acetaminophen  IVPB. 1000 milliGRAM(s) IV Intermittent once  buDESOnide 160 MICROgram(s)/formoterol 4.5 MICROgram(s) Inhaler 2 Puff(s) Inhalation two times a day  cefTRIAXone   IVPB 2 Gram(s) IV Intermittent every 24 hours  clindamycin IVPB 600 milliGRAM(s) IV Intermittent every 8 hours  docusate sodium 100 milliGRAM(s) Oral two times a day  enoxaparin Injectable 40 milliGRAM(s) SubCutaneous daily  hydrochlorothiazide 12.5 milliGRAM(s) Oral daily  montelukast 10 milliGRAM(s) Oral daily  propranolol LA 80 milliGRAM(s) Oral daily  senna 2 Tablet(s) Oral at bedtime      SOCIAL HISTORY:  Retired       ___________________________________________  REVIEW OF SYSTEMS:    Constitutional: No fevers, chills, no recent weight loss  ENMT: No changes in hearing, no changes in vision, no sore throat, no cough  Respiratory: No shortness of breath  Cardiovascular: No chest pain, palpitations  Gastrointestinal: No abdominal pain, no diarrhea/constipation  Genitourinary: No dysuria, frequency, or urgency    Extremities: swelling left foot and open wound  Neurological: No paresthesia  Skin: Open wound  ___________________________________________  OBJECTIVE:  Vital Signs Last 24 Hrs  T(C): 37.4 (09 Apr 2018 16:51), Max: 37.4 (09 Apr 2018 16:51)  T(F): 99.3 (09 Apr 2018 16:51), Max: 99.3 (09 Apr 2018 16:51)  HR: 81 (09 Apr 2018 16:51) (78 - 90)  BP: 113/74 (09 Apr 2018 16:51) (102/68 - 135/77)  BP(mean): --  RR: 17 (09 Apr 2018 16:51) (16 - 18)  SpO2: 94% (09 Apr 2018 16:51) (93% - 96%)CAPILLARY BLOOD GLUCOSE        I&O's Detail    08 Apr 2018 07:01  -  09 Apr 2018 07:00  --------------------------------------------------------  IN:    IV PiggyBack: 250 mL    Oral Fluid: 980 mL  Total IN: 1230 mL    OUT:    Voided: 1650 mL  Total OUT: 1650 mL    Total NET: -420 mL      09 Apr 2018 07:01  -  09 Apr 2018 17:44  --------------------------------------------------------  IN:    Oral Fluid: 820 mL  Total IN: 820 mL    OUT:    Voided: 250 mL  Total OUT: 250 mL    Total NET: 570 mL          PHYSICAL EXAM:    General: Alert, NAD  Neuro: grossly intact with no focal deficits. ambulates without assistance  HEENT: Normocephalic, atraumatic, EOMI  Respiratory: Breathing non-labored, airway patent  Extremities: Left foot wound   MSK: Intact ROM.  ____________________________________________  LABS:  CBC Full  -  ( 09 Apr 2018 06:58 )  WBC Count : 14.3 K/uL  Hemoglobin : 13.4 g/dL  Hematocrit : 38.8 %  Platelet Count - Automated : 287 K/uL  Mean Cell Volume : 94.8 fl  Mean Cell Hemoglobin : 32.6 pg  Mean Cell Hemoglobin Concentration : 34.4 gm/dL  Auto Neutrophil # : x  Auto Lymphocyte # : x  Auto Monocyte # : x  Auto Eosinophil # : x  Auto Basophil # : x  Auto Neutrophil % : x  Auto Lymphocyte % : x  Auto Monocyte % : x  Auto Eosinophil % : x  Auto Basophil % : x    04-09    139  |  99  |  20  ----------------------------<  92  3.9   |  29  |  0.98    Ca    9.1      09 Apr 2018 06:59                  ____________________________________________  MICRO:    Culture - Tissue with Gram Stain (collected 07 Apr 2018 01:29)  Source: .Tissue Other, left foot tissue  Gram Stain (07 Apr 2018 06:14):    No polymorphonuclear cells seen    Few Gram positive cocci in pairs per oil power field  Preliminary Report (07 Apr 2018 19:00):    Moderate Streptococcus pyogenes (Group A) "Susceptibilities not performed"    Culture - Surgical Swab (collected 07 Apr 2018 01:29)  Source: .Surgical Swab left foot  Preliminary Report (07 Apr 2018 18:19):    Moderate Streptococcus pyogenes (Group A) Plastic Surgery Consult Note  ([phone])    HPI:  67M with PMH COPD, PAD, HTN presenting with LLE pain (8/10), discoloration. Pt was in the [facility] and felt a bug bite on LLE on Sunday night. Pain began the next morning, and he started to have erythema in the proximal left thigh and left anterior thigh. Erythema and pain improved in the proximal LE, but worsened in distally to include the L foot. On Wednesday, pt was flying back to the , and noticed bullae formation on the L. foot, which he punctured w/ a toothpick, inducing yellow pus. Associated symptoms include: loss of appetite, cough w/ green sputum (since Sun night), feel hot/cold (Mon only), Of note, pt was using naproxen for pain and had contact w/ stray dogs in the [facility]. Now s/p wide debridement. Asked to evaluate patient for wound management        CT scan: circumferential SQ edema in LLE, but no soft tissue gas, or foreign body   LLE X-rays: no soft tissue gas, no osteomyelitis (06 Apr 2018 08:16)      PAST MEDICAL & SURGICAL HISTORY:  Migraine  Smoking addiction  Primary osteoarthritis of right hip  Seasonal allergies  Asthma: no h/o hospitalization or intubation  HTN (hypertension)  S/P hip replacement: right, 2012  Cataract: h/o Cataract surgery  4 yrs ago  Colon polyp: h/o colon polypectomy and partial colectomy 2007  Gall stones: h/o Cholecystectomy 2008      Allergies    All nuts (Anaphylaxis)  No Known Drug Allergies  shellfish (Anaphylaxis)        Home Medications:  Advair Diskus 250 mcg-50 mcg inhalation powder: 1 puff(s) inhaled 2 times a day (06 Apr 2018 11:52)  hydroCHLOROthiazide 12.5 mg oral tablet: 1 tab(s) orally once a day (06 Apr 2018 11:53)  propranolol 80 mg oral capsule, extended release: 1 cap(s) orally once a day (06 Apr 2018 11:53)  Singulair 10 mg oral tablet: 1 tab(s) orally once a day (06 Apr 2018 11:53)      MEDICATIONS  (STANDING):  acetaminophen  IVPB. 1000 milliGRAM(s) IV Intermittent once  buDESOnide 160 MICROgram(s)/formoterol 4.5 MICROgram(s) Inhaler 2 Puff(s) Inhalation two times a day  cefTRIAXone   IVPB 2 Gram(s) IV Intermittent every 24 hours  clindamycin IVPB 600 milliGRAM(s) IV Intermittent every 8 hours  docusate sodium 100 milliGRAM(s) Oral two times a day  enoxaparin Injectable 40 milliGRAM(s) SubCutaneous daily  hydrochlorothiazide 12.5 milliGRAM(s) Oral daily  montelukast 10 milliGRAM(s) Oral daily  propranolol LA 80 milliGRAM(s) Oral daily  senna 2 Tablet(s) Oral at bedtime      SOCIAL HISTORY:  Retired       ___________________________________________  REVIEW OF SYSTEMS:    Constitutional: No fevers, chills, no recent weight loss  ENMT: No changes in hearing, no changes in vision, no sore throat, no cough  Respiratory: No shortness of breath  Cardiovascular: No chest pain, palpitations  Gastrointestinal: No abdominal pain, no diarrhea/constipation  Genitourinary: No dysuria, frequency, or urgency    Extremities: swelling left foot ankle and leg with open wounds and exposed extensor muscle belly /tendons in foot void of paratenon  Swelling and fading cellulitis  Neurological: No paresthesia  Skin: Open wound  ___________________________________________  OBJECTIVE:  Vital Signs Last 24 Hrs  T(C): 37.4 (09 Apr 2018 16:51), Max: 37.4 (09 Apr 2018 16:51)  T(F): 99.3 (09 Apr 2018 16:51), Max: 99.3 (09 Apr 2018 16:51)  HR: 81 (09 Apr 2018 16:51) (78 - 90)  BP: 113/74 (09 Apr 2018 16:51) (102/68 - 135/77)  BP(mean): --  RR: 17 (09 Apr 2018 16:51) (16 - 18)  SpO2: 94% (09 Apr 2018 16:51) (93% - 96%)CAPILLARY BLOOD GLUCOSE        I&O's Detail    08 Apr 2018 07:01  -  09 Apr 2018 07:00  --------------------------------------------------------  IN:    IV PiggyBack: 250 mL    Oral Fluid: 980 mL  Total IN: 1230 mL    OUT:    Voided: 1650 mL  Total OUT: 1650 mL    Total NET: -420 mL      09 Apr 2018 07:01  -  09 Apr 2018 17:44  --------------------------------------------------------  IN:    Oral Fluid: 820 mL  Total IN: 820 mL    OUT:    Voided: 250 mL  Total OUT: 250 mL    Total NET: 570 mL          PHYSICAL EXAM:    General: Alert, NAD  Neuro: grossly intact with no focal deficits. ambulates without assistance  HEENT: Normocephalic, atraumatic, EOMI  Respiratory: Breathing non-labored, airway patent  Extremities: Left foot wound   MSK: Intact ROM.  ____________________________________________  LABS:  CBC Full  -  ( 09 Apr 2018 06:58 )  WBC Count : 14.3 K/uL  Hemoglobin : 13.4 g/dL  Hematocrit : 38.8 %  Platelet Count - Automated : 287 K/uL  Mean Cell Volume : 94.8 fl  Mean Cell Hemoglobin : 32.6 pg  Mean Cell Hemoglobin Concentration : 34.4 gm/dL  Auto Neutrophil # : x  Auto Lymphocyte # : x  Auto Monocyte # : x  Auto Eosinophil # : x  Auto Basophil # : x  Auto Neutrophil % : x  Auto Lymphocyte % : x  Auto Monocyte % : x  Auto Eosinophil % : x  Auto Basophil % : x    04-09    139  |  99  |  20  ----------------------------<  92  3.9   |  29  |  0.98    Ca    9.1      09 Apr 2018 06:59                  ____________________________________________  MICRO:    Culture - Tissue with Gram Stain (collected 07 Apr 2018 01:29)  Source: .Tissue Other, left foot tissue  Gram Stain (07 Apr 2018 06:14):    No polymorphonuclear cells seen    Few Gram positive cocci in pairs per oil power field  Preliminary Report (07 Apr 2018 19:00):    Moderate Streptococcus pyogenes (Group A) "Susceptibilities not performed"    Culture - Surgical Swab (collected 07 Apr 2018 01:29)  Source: .Surgical Swab left foot  Preliminary Report (07 Apr 2018 18:19):    Moderate Streptococcus pyogenes (Group A)

## 2018-04-09 NOTE — PROGRESS NOTE ADULT - ASSESSMENT
67 male with COPD, PAD, HTN here with left lower extremity pain. S/p debridement ofr left lower extremity group a strep necrotizing fascitis.    Continue ceftriaxone.  History of PCN allergy as a child- he had testing by an allergist who said he is not allergic.  I asked the pt to get the name of that allergist so we can get a copy of that work up.     Stop clindamycin on 4/11.     Wound care.

## 2018-04-09 NOTE — PHYSICAL THERAPY INITIAL EVALUATION ADULT - ADDITIONAL COMMENTS
Pt lives in private home with wife, 3 GLENIS +1 HR. Additional flight of steps to bedroom. Pt was independent with all functional mobility prior to admission, sometimes used a cane due to hip pain.

## 2018-04-09 NOTE — PHYSICAL THERAPY INITIAL EVALUATION ADULT - PLANNED THERAPY INTERVENTIONS, PT EVAL
balance training/bed mobility training/GOAL: Pt will negotiate 10 steps with 1 HR and step to pattern independently in 4 weeks./gait training/transfer training

## 2018-04-09 NOTE — CONSULT NOTE ADULT - ASSESSMENT
Open wound left foot Open wound left foot/ankle and leg with exposed devascularized tendons secondary to severe necrotizing soft tissue infection    Plan  Elevation/compression  Cont Adaptec gauze cover with dakins 0.25% solution.  Change dakins wet gauze twice daily with outer dressing and adaptec once daily  Cont Abx  Follow WBC  If WBC improves and wound bed stable will plan to go to OR on friday and cover surface with integra for planned staged reconstruction

## 2018-04-10 LAB
ANION GAP SERPL CALC-SCNC: 13 MMOL/L — SIGNIFICANT CHANGE UP (ref 5–17)
BUN SERPL-MCNC: 19 MG/DL — SIGNIFICANT CHANGE UP (ref 7–23)
CALCIUM SERPL-MCNC: 9.1 MG/DL — SIGNIFICANT CHANGE UP (ref 8.4–10.5)
CHLORIDE SERPL-SCNC: 97 MMOL/L — SIGNIFICANT CHANGE UP (ref 96–108)
CO2 SERPL-SCNC: 27 MMOL/L — SIGNIFICANT CHANGE UP (ref 22–31)
CREAT SERPL-MCNC: 0.96 MG/DL — SIGNIFICANT CHANGE UP (ref 0.5–1.3)
GLUCOSE SERPL-MCNC: 103 MG/DL — HIGH (ref 70–99)
HCT VFR BLD CALC: 40.3 % — SIGNIFICANT CHANGE UP (ref 39–50)
HGB BLD-MCNC: 13.5 G/DL — SIGNIFICANT CHANGE UP (ref 13–17)
MAGNESIUM SERPL-MCNC: 2.1 MG/DL — SIGNIFICANT CHANGE UP (ref 1.6–2.6)
MCHC RBC-ENTMCNC: 31.9 PG — SIGNIFICANT CHANGE UP (ref 27–34)
MCHC RBC-ENTMCNC: 33.5 GM/DL — SIGNIFICANT CHANGE UP (ref 32–36)
MCV RBC AUTO: 95.2 FL — SIGNIFICANT CHANGE UP (ref 80–100)
PHOSPHATE SERPL-MCNC: 2.9 MG/DL — SIGNIFICANT CHANGE UP (ref 2.5–4.5)
PLATELET # BLD AUTO: 299 K/UL — SIGNIFICANT CHANGE UP (ref 150–400)
POTASSIUM SERPL-MCNC: 3.8 MMOL/L — SIGNIFICANT CHANGE UP (ref 3.5–5.3)
POTASSIUM SERPL-SCNC: 3.8 MMOL/L — SIGNIFICANT CHANGE UP (ref 3.5–5.3)
RBC # BLD: 4.24 M/UL — SIGNIFICANT CHANGE UP (ref 4.2–5.8)
RBC # FLD: 13.6 % — SIGNIFICANT CHANGE UP (ref 10.3–14.5)
SODIUM SERPL-SCNC: 137 MMOL/L — SIGNIFICANT CHANGE UP (ref 135–145)
WBC # BLD: 15.3 K/UL — HIGH (ref 3.8–10.5)
WBC # FLD AUTO: 15.3 K/UL — HIGH (ref 3.8–10.5)

## 2018-04-10 PROCEDURE — 99232 SBSQ HOSP IP/OBS MODERATE 35: CPT

## 2018-04-10 PROCEDURE — 99233 SBSQ HOSP IP/OBS HIGH 50: CPT

## 2018-04-10 RX ORDER — HYDROMORPHONE HYDROCHLORIDE 2 MG/ML
0.5 INJECTION INTRAMUSCULAR; INTRAVENOUS; SUBCUTANEOUS ONCE
Qty: 0 | Refills: 0 | Status: DISCONTINUED | OUTPATIENT
Start: 2018-04-10 | End: 2018-04-10

## 2018-04-10 RX ORDER — POTASSIUM CHLORIDE 20 MEQ
20 PACKET (EA) ORAL ONCE
Qty: 0 | Refills: 0 | Status: COMPLETED | OUTPATIENT
Start: 2018-04-10 | End: 2018-04-10

## 2018-04-10 RX ADMIN — Medication 100 MILLIGRAM(S): at 05:38

## 2018-04-10 RX ADMIN — Medication 12.5 MILLIGRAM(S): at 05:38

## 2018-04-10 RX ADMIN — ENOXAPARIN SODIUM 40 MILLIGRAM(S): 100 INJECTION SUBCUTANEOUS at 12:08

## 2018-04-10 RX ADMIN — MONTELUKAST 10 MILLIGRAM(S): 4 TABLET, CHEWABLE ORAL at 21:17

## 2018-04-10 RX ADMIN — Medication 80 MILLIGRAM(S): at 05:38

## 2018-04-10 RX ADMIN — SENNA PLUS 2 TABLET(S): 8.6 TABLET ORAL at 21:16

## 2018-04-10 RX ADMIN — HYDROMORPHONE HYDROCHLORIDE 0.5 MILLIGRAM(S): 2 INJECTION INTRAMUSCULAR; INTRAVENOUS; SUBCUTANEOUS at 12:04

## 2018-04-10 RX ADMIN — Medication 100 MILLIGRAM(S): at 12:08

## 2018-04-10 RX ADMIN — Medication 1 APPLICATION(S): at 12:09

## 2018-04-10 RX ADMIN — CEFTRIAXONE 100 GRAM(S): 500 INJECTION, POWDER, FOR SOLUTION INTRAMUSCULAR; INTRAVENOUS at 17:15

## 2018-04-10 RX ADMIN — Medication 100 MILLIGRAM(S): at 17:15

## 2018-04-10 RX ADMIN — BUDESONIDE AND FORMOTEROL FUMARATE DIHYDRATE 2 PUFF(S): 160; 4.5 AEROSOL RESPIRATORY (INHALATION) at 05:37

## 2018-04-10 RX ADMIN — Medication 100 MILLIGRAM(S): at 21:20

## 2018-04-10 RX ADMIN — Medication 20 MILLIEQUIVALENT(S): at 12:08

## 2018-04-10 RX ADMIN — HYDROMORPHONE HYDROCHLORIDE 0.5 MILLIGRAM(S): 2 INJECTION INTRAMUSCULAR; INTRAVENOUS; SUBCUTANEOUS at 11:34

## 2018-04-10 RX ADMIN — BUDESONIDE AND FORMOTEROL FUMARATE DIHYDRATE 2 PUFF(S): 160; 4.5 AEROSOL RESPIRATORY (INHALATION) at 17:15

## 2018-04-10 NOTE — PROGRESS NOTE ADULT - ASSESSMENT
67 male with COPD, PAD, HTN here with left lower extremity pain. S/p debridement ofr left lower extremity group a strep necrotizing fascitis.    History of PCN allergy as a child- he had testing by an allergist who said he is not allergic. I will contact his allergist.     Stop clindamycin on 4/11.   Continue ceftriaxone.    Wound care.  Monitor leukocytosis.

## 2018-04-10 NOTE — PROGRESS NOTE ADULT - ASSESSMENT
67 year old male with necrotizing fasciitis of lower left extemity, s/p debridement    -Plan for OR on Friday with Plastic surgery for Integra reconstruction  -Dressing changes BID (Only gauze change in PM)  -DVT ppx  -Monitor WBC

## 2018-04-11 LAB
ANION GAP SERPL CALC-SCNC: 14 MMOL/L — SIGNIFICANT CHANGE UP (ref 5–17)
BUN SERPL-MCNC: 19 MG/DL — SIGNIFICANT CHANGE UP (ref 7–23)
CALCIUM SERPL-MCNC: 9.9 MG/DL — SIGNIFICANT CHANGE UP (ref 8.4–10.5)
CHLORIDE SERPL-SCNC: 98 MMOL/L — SIGNIFICANT CHANGE UP (ref 96–108)
CO2 SERPL-SCNC: 28 MMOL/L — SIGNIFICANT CHANGE UP (ref 22–31)
CREAT SERPL-MCNC: 0.91 MG/DL — SIGNIFICANT CHANGE UP (ref 0.5–1.3)
CULTURE RESULTS: SIGNIFICANT CHANGE UP
GLUCOSE SERPL-MCNC: 102 MG/DL — HIGH (ref 70–99)
HCT VFR BLD CALC: 40.9 % — SIGNIFICANT CHANGE UP (ref 39–50)
HGB BLD-MCNC: 13.9 G/DL — SIGNIFICANT CHANGE UP (ref 13–17)
MAGNESIUM SERPL-MCNC: 2 MG/DL — SIGNIFICANT CHANGE UP (ref 1.6–2.6)
MCHC RBC-ENTMCNC: 32.6 PG — SIGNIFICANT CHANGE UP (ref 27–34)
MCHC RBC-ENTMCNC: 34.1 GM/DL — SIGNIFICANT CHANGE UP (ref 32–36)
MCV RBC AUTO: 95.5 FL — SIGNIFICANT CHANGE UP (ref 80–100)
PHOSPHATE SERPL-MCNC: 3.8 MG/DL — SIGNIFICANT CHANGE UP (ref 2.5–4.5)
PLATELET # BLD AUTO: 344 K/UL — SIGNIFICANT CHANGE UP (ref 150–400)
POTASSIUM SERPL-MCNC: 4.1 MMOL/L — SIGNIFICANT CHANGE UP (ref 3.5–5.3)
POTASSIUM SERPL-SCNC: 4.1 MMOL/L — SIGNIFICANT CHANGE UP (ref 3.5–5.3)
RBC # BLD: 4.28 M/UL — SIGNIFICANT CHANGE UP (ref 4.2–5.8)
RBC # FLD: 13.7 % — SIGNIFICANT CHANGE UP (ref 10.3–14.5)
SODIUM SERPL-SCNC: 140 MMOL/L — SIGNIFICANT CHANGE UP (ref 135–145)
SPECIMEN SOURCE: SIGNIFICANT CHANGE UP
WBC # BLD: 12.6 K/UL — HIGH (ref 3.8–10.5)
WBC # FLD AUTO: 12.6 K/UL — HIGH (ref 3.8–10.5)

## 2018-04-11 PROCEDURE — 99233 SBSQ HOSP IP/OBS HIGH 50: CPT

## 2018-04-11 PROCEDURE — 99232 SBSQ HOSP IP/OBS MODERATE 35: CPT

## 2018-04-11 RX ORDER — HYDROMORPHONE HYDROCHLORIDE 2 MG/ML
0.5 INJECTION INTRAMUSCULAR; INTRAVENOUS; SUBCUTANEOUS ONCE
Qty: 0 | Refills: 0 | Status: DISCONTINUED | OUTPATIENT
Start: 2018-04-11 | End: 2018-04-11

## 2018-04-11 RX ORDER — OMEGA-3 ACID ETHYL ESTERS 1 G
1 CAPSULE ORAL DAILY
Qty: 0 | Refills: 0 | Status: DISCONTINUED | OUTPATIENT
Start: 2018-04-11 | End: 2018-04-13

## 2018-04-11 RX ORDER — HYDROMORPHONE HYDROCHLORIDE 2 MG/ML
0.25 INJECTION INTRAMUSCULAR; INTRAVENOUS; SUBCUTANEOUS ONCE
Qty: 0 | Refills: 0 | Status: DISCONTINUED | OUTPATIENT
Start: 2018-04-11 | End: 2018-04-13

## 2018-04-11 RX ORDER — CHOLECALCIFEROL (VITAMIN D3) 125 MCG
5000 CAPSULE ORAL DAILY
Qty: 0 | Refills: 0 | Status: DISCONTINUED | OUTPATIENT
Start: 2018-04-11 | End: 2018-04-13

## 2018-04-11 RX ORDER — IBUPROFEN 200 MG
600 TABLET ORAL EVERY 8 HOURS
Qty: 0 | Refills: 0 | Status: DISCONTINUED | OUTPATIENT
Start: 2018-04-11 | End: 2018-04-13

## 2018-04-11 RX ADMIN — ENOXAPARIN SODIUM 40 MILLIGRAM(S): 100 INJECTION SUBCUTANEOUS at 13:06

## 2018-04-11 RX ADMIN — Medication 600 MILLIGRAM(S): at 22:30

## 2018-04-11 RX ADMIN — HYDROMORPHONE HYDROCHLORIDE 0.5 MILLIGRAM(S): 2 INJECTION INTRAMUSCULAR; INTRAVENOUS; SUBCUTANEOUS at 06:54

## 2018-04-11 RX ADMIN — Medication 100 MILLIGRAM(S): at 21:13

## 2018-04-11 RX ADMIN — Medication 80 MILLIGRAM(S): at 05:31

## 2018-04-11 RX ADMIN — Medication 1 APPLICATION(S): at 17:59

## 2018-04-11 RX ADMIN — Medication 100 MILLIGRAM(S): at 17:59

## 2018-04-11 RX ADMIN — MONTELUKAST 10 MILLIGRAM(S): 4 TABLET, CHEWABLE ORAL at 21:13

## 2018-04-11 RX ADMIN — Medication 400 MILLIGRAM(S): at 18:35

## 2018-04-11 RX ADMIN — Medication 100 MILLIGRAM(S): at 05:35

## 2018-04-11 RX ADMIN — Medication 1 GRAM(S): at 21:12

## 2018-04-11 RX ADMIN — Medication 600 MILLIGRAM(S): at 21:50

## 2018-04-11 RX ADMIN — Medication 12.5 MILLIGRAM(S): at 05:31

## 2018-04-11 RX ADMIN — BUDESONIDE AND FORMOTEROL FUMARATE DIHYDRATE 2 PUFF(S): 160; 4.5 AEROSOL RESPIRATORY (INHALATION) at 05:32

## 2018-04-11 RX ADMIN — Medication 100 MILLIGRAM(S): at 13:05

## 2018-04-11 RX ADMIN — BUDESONIDE AND FORMOTEROL FUMARATE DIHYDRATE 2 PUFF(S): 160; 4.5 AEROSOL RESPIRATORY (INHALATION) at 18:00

## 2018-04-11 RX ADMIN — CEFTRIAXONE 100 GRAM(S): 500 INJECTION, POWDER, FOR SOLUTION INTRAMUSCULAR; INTRAVENOUS at 18:00

## 2018-04-11 NOTE — DIETITIAN INITIAL EVALUATION ADULT. - PROBLEM SELECTOR PLAN 2
Presenting in severe sepsis, worsening cellulitis of LLE w/ burst bullae, etiology unknown at this time, poss 2/2 to bug bite in [place]   Plan as above:  C/w wilmer/clinda/vanco   F/u ID recs, abx as above  Surgery c/s for poss necrotic wound   F/u BCx   Trend CBC   Monitor temperature curve  Consider wound care c/s if not improving

## 2018-04-11 NOTE — PROVIDER CONTACT NOTE (OTHER) - ASSESSMENT
dressing in place, no redness noted above ace wrap, pt with c/o sudden burning, does not want pain medication at this time

## 2018-04-11 NOTE — PROVIDER CONTACT NOTE (OTHER) - ACTION/TREATMENT ORDERED:
PA notified, will come to assess pt, pt refusing pain medication at this time, will continue to monitor

## 2018-04-11 NOTE — DIETITIAN INITIAL EVALUATION ADULT. - PROBLEM SELECTOR PLAN 5
Pt taking propanolol 80mg ER daily, HCTZ  BP wnl, will monitor in the setting of severe sepsis  Will hold HCTZ in setting of sepsis, JUNAID  Will restart propanol at 40 mg ER qd

## 2018-04-11 NOTE — PROGRESS NOTE ADULT - ASSESSMENT
66 y/o M PMH COPD, PAD, HTN p/w chills/LLE pain, bullae adm for severe sepsis 2/2 GAS necrotizing fasciitis s/p debridement 4/6, w/ course c/b JUNAID (now resolved). Now pending OR Friday for integra.

## 2018-04-11 NOTE — PROGRESS NOTE ADULT - ASSESSMENT
67 male with COPD, PAD, HTN here with left lower extremity pain. S/p debridement ofr left lower extremity group a strep necrotizing fascitis.    Stop clindamycin on 4/11.   Continue ceftriaxone.    Wound care.  Monitor leukocytosis.

## 2018-04-11 NOTE — DIETITIAN INITIAL EVALUATION ADULT. - ENERGY NEEDS
on 4/11 pt weighed on bed weight at 241.6pounds-dicrepancy between bed weight and standing scale weight, standing scale weight at time of visit on 4/11 is 259pounds  current BMI 37 based on standing scale weight which pt agrees is more on target with actual weight

## 2018-04-11 NOTE — DIETITIAN INITIAL EVALUATION ADULT. - ORAL INTAKE PTA
good/he stopped smoking ~ 1 and 1/2 years ago, since then he has been eating whatever he wanted and was gaining weight, more recently his weight is leveling out

## 2018-04-11 NOTE — DIETITIAN INITIAL EVALUATION ADULT. - PROBLEM SELECTOR PLAN 1
Pt p/w hypothermia, WBC 17, HR >90, and source cellulitis of LLE, with burst bullae, w/ organ dysfunction (JUNAID to 1.82). Lactate mildly elevated to 2.8.  S/p wilmer, clinda, vanco, cefepime, 2L NS in the ED  HD stable at this time  Plan:  Repeat VBG w/ lactate, will hold further IVF if normal  will start vanco/wilmer/clinda  ID c/s, f/u recs   Surgery c/s for necrotic wound  F/u BCx

## 2018-04-11 NOTE — DIETITIAN INITIAL EVALUATION ADULT. - OTHER INFO
seen for length of stay, admitted for LLE cellulitis, consuming>75% of meals, denies nausea/vomit, denies difficulty chewing /swallow. last BM today. he denies allergies to shellfish and nuts, most recent allergy testing does not indicate allergies as per MD-agrees to remove allergies. usually he takes 1000mg Fish oil, 5,000IU VitD3,200mg CoQ10, 500mg Mg daily PTA, questioning if he could receive here, contacted [facility]-will order 1 Gram Omega3, 5000 IU VitD3, COQ10 not available and Mg is repleted as needed. ,

## 2018-04-12 ENCOUNTER — TRANSCRIPTION ENCOUNTER (OUTPATIENT)
Age: 68
End: 2018-04-12

## 2018-04-12 LAB
ANION GAP SERPL CALC-SCNC: 12 MMOL/L — SIGNIFICANT CHANGE UP (ref 5–17)
APTT BLD: 26.2 SEC — LOW (ref 27.5–37.4)
BLD GP AB SCN SERPL QL: NEGATIVE — SIGNIFICANT CHANGE UP
BUN SERPL-MCNC: 19 MG/DL — SIGNIFICANT CHANGE UP (ref 7–23)
CALCIUM SERPL-MCNC: 9.6 MG/DL — SIGNIFICANT CHANGE UP (ref 8.4–10.5)
CHLORIDE SERPL-SCNC: 101 MMOL/L — SIGNIFICANT CHANGE UP (ref 96–108)
CO2 SERPL-SCNC: 25 MMOL/L — SIGNIFICANT CHANGE UP (ref 22–31)
CREAT SERPL-MCNC: 1.01 MG/DL — SIGNIFICANT CHANGE UP (ref 0.5–1.3)
CULTURE RESULTS: SIGNIFICANT CHANGE UP
GLUCOSE SERPL-MCNC: 107 MG/DL — HIGH (ref 70–99)
HCT VFR BLD CALC: 41.1 % — SIGNIFICANT CHANGE UP (ref 39–50)
HGB BLD-MCNC: 13.9 G/DL — SIGNIFICANT CHANGE UP (ref 13–17)
INR BLD: 1.09 RATIO — SIGNIFICANT CHANGE UP (ref 0.88–1.16)
MAGNESIUM SERPL-MCNC: 2 MG/DL — SIGNIFICANT CHANGE UP (ref 1.6–2.6)
MCHC RBC-ENTMCNC: 32.2 PG — SIGNIFICANT CHANGE UP (ref 27–34)
MCHC RBC-ENTMCNC: 33.8 GM/DL — SIGNIFICANT CHANGE UP (ref 32–36)
MCV RBC AUTO: 95.4 FL — SIGNIFICANT CHANGE UP (ref 80–100)
PHOSPHATE SERPL-MCNC: 4.3 MG/DL — SIGNIFICANT CHANGE UP (ref 2.5–4.5)
PLATELET # BLD AUTO: 381 K/UL — SIGNIFICANT CHANGE UP (ref 150–400)
POTASSIUM SERPL-MCNC: 4.4 MMOL/L — SIGNIFICANT CHANGE UP (ref 3.5–5.3)
POTASSIUM SERPL-SCNC: 4.4 MMOL/L — SIGNIFICANT CHANGE UP (ref 3.5–5.3)
PROTHROM AB SERPL-ACNC: 11.9 SEC — SIGNIFICANT CHANGE UP (ref 9.8–12.7)
RBC # BLD: 4.31 M/UL — SIGNIFICANT CHANGE UP (ref 4.2–5.8)
RBC # FLD: 13.5 % — SIGNIFICANT CHANGE UP (ref 10.3–14.5)
RH IG SCN BLD-IMP: POSITIVE — SIGNIFICANT CHANGE UP
SODIUM SERPL-SCNC: 138 MMOL/L — SIGNIFICANT CHANGE UP (ref 135–145)
SPECIMEN SOURCE: SIGNIFICANT CHANGE UP
WBC # BLD: 11.2 K/UL — HIGH (ref 3.8–10.5)
WBC # FLD AUTO: 11.2 K/UL — HIGH (ref 3.8–10.5)

## 2018-04-12 PROCEDURE — 99232 SBSQ HOSP IP/OBS MODERATE 35: CPT

## 2018-04-12 PROCEDURE — 99233 SBSQ HOSP IP/OBS HIGH 50: CPT

## 2018-04-12 RX ORDER — OXYCODONE AND ACETAMINOPHEN 5; 325 MG/1; MG/1
1 TABLET ORAL ONCE
Qty: 0 | Refills: 0 | Status: DISCONTINUED | OUTPATIENT
Start: 2018-04-12 | End: 2018-04-12

## 2018-04-12 RX ORDER — DOCUSATE SODIUM 100 MG
100 CAPSULE ORAL THREE TIMES A DAY
Qty: 0 | Refills: 0 | Status: DISCONTINUED | OUTPATIENT
Start: 2018-04-12 | End: 2018-04-13

## 2018-04-12 RX ORDER — SODIUM CHLORIDE 9 MG/ML
1000 INJECTION INTRAMUSCULAR; INTRAVENOUS; SUBCUTANEOUS
Qty: 0 | Refills: 0 | Status: DISCONTINUED | OUTPATIENT
Start: 2018-04-12 | End: 2018-04-13

## 2018-04-12 RX ADMIN — OXYCODONE AND ACETAMINOPHEN 1 TABLET(S): 5; 325 TABLET ORAL at 12:52

## 2018-04-12 RX ADMIN — Medication 1 GRAM(S): at 12:52

## 2018-04-12 RX ADMIN — CEFTRIAXONE 100 GRAM(S): 500 INJECTION, POWDER, FOR SOLUTION INTRAMUSCULAR; INTRAVENOUS at 17:51

## 2018-04-12 RX ADMIN — Medication 100 MILLIGRAM(S): at 05:42

## 2018-04-12 RX ADMIN — Medication 80 MILLIGRAM(S): at 05:42

## 2018-04-12 RX ADMIN — Medication 1 APPLICATION(S): at 12:54

## 2018-04-12 RX ADMIN — Medication 12.5 MILLIGRAM(S): at 05:42

## 2018-04-12 RX ADMIN — Medication 100 MILLIGRAM(S): at 12:55

## 2018-04-12 RX ADMIN — Medication 600 MILLIGRAM(S): at 23:23

## 2018-04-12 RX ADMIN — BUDESONIDE AND FORMOTEROL FUMARATE DIHYDRATE 2 PUFF(S): 160; 4.5 AEROSOL RESPIRATORY (INHALATION) at 05:42

## 2018-04-12 RX ADMIN — ENOXAPARIN SODIUM 40 MILLIGRAM(S): 100 INJECTION SUBCUTANEOUS at 12:52

## 2018-04-12 RX ADMIN — BUDESONIDE AND FORMOTEROL FUMARATE DIHYDRATE 2 PUFF(S): 160; 4.5 AEROSOL RESPIRATORY (INHALATION) at 17:52

## 2018-04-12 RX ADMIN — Medication 5000 UNIT(S): at 12:53

## 2018-04-12 RX ADMIN — MONTELUKAST 10 MILLIGRAM(S): 4 TABLET, CHEWABLE ORAL at 20:59

## 2018-04-12 NOTE — PROGRESS NOTE ADULT - ASSESSMENT
66 y/o M PMH COPD, PAD, HTN p/w chills/LLE pain, bullae adm for severe sepsis 2/2 GAS necrotizing fasciitis s/p debridement 4/6, w/ course c/b JUNAID (now resolved). Now pending OR Friday for integra/VAC.

## 2018-04-12 NOTE — PROGRESS NOTE ADULT - ASSESSMENT
67 year old male with a medical history significant for COPD, PAD, HTN here with left lower extremity pain. S/p debridement of the left lower extremity for a group A strep necrotizing soft tissue infection.    - Plan for OR Friday for integra  - pain mgmt per primary team along w/ dressing change

## 2018-04-12 NOTE — PROGRESS NOTE ADULT - ASSESSMENT
67y Male who presents with necrotizing fasciitis of LLE    Plan:  -DVT PPX  -Pain control   -OOB as tolerated with assistance   -Diet as tolerated  -OR with plastics tomorrow for Integra - NPOpmn/IVF 67y Male who presents with necrotizing fasciitis of LLE    Plan:  -DVT PPX  -Pain control   -OOB as tolerated with assistance   -BID dressing changes  -OR with plastics tomorrow for Integra - NPOpmn/IVF

## 2018-04-12 NOTE — PROGRESS NOTE ADULT - ASSESSMENT
67 male with COPD, PAD, HTN here with left lower extremity pain. S/p debridement ofr left lower extremity group a strep necrotizing fascitis.       Continue ceftriaxone.    Wound care.  Monitor leukocytosis. 67 male with COPD, PAD, HTN here with left lower extremity pain. S/p debridement ofr left lower extremity group a strep necrotizing fascitis.       Continue ceftriaxone.    Wound care.  Monitor leukocytosis.    When stable for discharge can change to Keflex 500 mg po q 6 for 10 additional days

## 2018-04-13 PROCEDURE — 99233 SBSQ HOSP IP/OBS HIGH 50: CPT

## 2018-04-13 PROCEDURE — 99232 SBSQ HOSP IP/OBS MODERATE 35: CPT

## 2018-04-13 RX ORDER — MONTELUKAST 4 MG/1
10 TABLET, CHEWABLE ORAL DAILY
Qty: 0 | Refills: 0 | Status: DISCONTINUED | OUTPATIENT
Start: 2018-04-13 | End: 2018-04-16

## 2018-04-13 RX ORDER — OMEGA-3 ACID ETHYL ESTERS 1 G
1 CAPSULE ORAL DAILY
Qty: 0 | Refills: 0 | Status: DISCONTINUED | OUTPATIENT
Start: 2018-04-13 | End: 2018-04-16

## 2018-04-13 RX ORDER — ENOXAPARIN SODIUM 100 MG/ML
40 INJECTION SUBCUTANEOUS DAILY
Qty: 0 | Refills: 0 | Status: DISCONTINUED | OUTPATIENT
Start: 2018-04-13 | End: 2018-04-16

## 2018-04-13 RX ORDER — HYDROMORPHONE HYDROCHLORIDE 2 MG/ML
0.5 INJECTION INTRAMUSCULAR; INTRAVENOUS; SUBCUTANEOUS
Qty: 0 | Refills: 0 | Status: DISCONTINUED | OUTPATIENT
Start: 2018-04-13 | End: 2018-04-13

## 2018-04-13 RX ORDER — OXYCODONE AND ACETAMINOPHEN 5; 325 MG/1; MG/1
2 TABLET ORAL EVERY 4 HOURS
Qty: 0 | Refills: 0 | Status: DISCONTINUED | OUTPATIENT
Start: 2018-04-13 | End: 2018-04-16

## 2018-04-13 RX ORDER — SENNA PLUS 8.6 MG/1
2 TABLET ORAL AT BEDTIME
Qty: 0 | Refills: 0 | Status: DISCONTINUED | OUTPATIENT
Start: 2018-04-13 | End: 2018-04-16

## 2018-04-13 RX ORDER — BUDESONIDE AND FORMOTEROL FUMARATE DIHYDRATE 160; 4.5 UG/1; UG/1
2 AEROSOL RESPIRATORY (INHALATION)
Qty: 0 | Refills: 0 | Status: DISCONTINUED | OUTPATIENT
Start: 2018-04-13 | End: 2018-04-16

## 2018-04-13 RX ORDER — CEFTRIAXONE 500 MG/1
2 INJECTION, POWDER, FOR SOLUTION INTRAMUSCULAR; INTRAVENOUS EVERY 24 HOURS
Qty: 0 | Refills: 0 | Status: DISCONTINUED | OUTPATIENT
Start: 2018-04-13 | End: 2018-04-16

## 2018-04-13 RX ORDER — OXYCODONE AND ACETAMINOPHEN 5; 325 MG/1; MG/1
1 TABLET ORAL ONCE
Qty: 0 | Refills: 0 | Status: DISCONTINUED | OUTPATIENT
Start: 2018-04-13 | End: 2018-04-13

## 2018-04-13 RX ORDER — PROPRANOLOL HCL 160 MG
80 CAPSULE, EXTENDED RELEASE 24HR ORAL DAILY
Qty: 0 | Refills: 0 | Status: DISCONTINUED | OUTPATIENT
Start: 2018-04-13 | End: 2018-04-16

## 2018-04-13 RX ORDER — CHOLECALCIFEROL (VITAMIN D3) 125 MCG
5000 CAPSULE ORAL DAILY
Qty: 0 | Refills: 0 | Status: DISCONTINUED | OUTPATIENT
Start: 2018-04-13 | End: 2018-04-16

## 2018-04-13 RX ORDER — ONDANSETRON 8 MG/1
4 TABLET, FILM COATED ORAL ONCE
Qty: 0 | Refills: 0 | Status: DISCONTINUED | OUTPATIENT
Start: 2018-04-13 | End: 2018-04-13

## 2018-04-13 RX ORDER — DOCUSATE SODIUM 100 MG
100 CAPSULE ORAL
Qty: 0 | Refills: 0 | Status: DISCONTINUED | OUTPATIENT
Start: 2018-04-13 | End: 2018-04-16

## 2018-04-13 RX ORDER — HYDROCHLOROTHIAZIDE 25 MG
12.5 TABLET ORAL DAILY
Qty: 0 | Refills: 0 | Status: DISCONTINUED | OUTPATIENT
Start: 2018-04-13 | End: 2018-04-16

## 2018-04-13 RX ORDER — IBUPROFEN 200 MG
600 TABLET ORAL EVERY 8 HOURS
Qty: 0 | Refills: 0 | Status: DISCONTINUED | OUTPATIENT
Start: 2018-04-13 | End: 2018-04-16

## 2018-04-13 RX ORDER — IPRATROPIUM/ALBUTEROL SULFATE 18-103MCG
3 AEROSOL WITH ADAPTER (GRAM) INHALATION ONCE
Qty: 0 | Refills: 0 | Status: DISCONTINUED | OUTPATIENT
Start: 2018-04-13 | End: 2018-04-16

## 2018-04-13 RX ORDER — OXYCODONE AND ACETAMINOPHEN 5; 325 MG/1; MG/1
1 TABLET ORAL EVERY 4 HOURS
Qty: 0 | Refills: 0 | Status: DISCONTINUED | OUTPATIENT
Start: 2018-04-13 | End: 2018-04-16

## 2018-04-13 RX ADMIN — OXYCODONE AND ACETAMINOPHEN 1 TABLET(S): 5; 325 TABLET ORAL at 21:04

## 2018-04-13 RX ADMIN — Medication 600 MILLIGRAM(S): at 07:15

## 2018-04-13 RX ADMIN — SODIUM CHLORIDE 125 MILLILITER(S): 9 INJECTION INTRAMUSCULAR; INTRAVENOUS; SUBCUTANEOUS at 08:12

## 2018-04-13 RX ADMIN — ENOXAPARIN SODIUM 40 MILLIGRAM(S): 100 INJECTION SUBCUTANEOUS at 15:02

## 2018-04-13 RX ADMIN — CEFTRIAXONE 100 GRAM(S): 500 INJECTION, POWDER, FOR SOLUTION INTRAMUSCULAR; INTRAVENOUS at 17:13

## 2018-04-13 RX ADMIN — Medication 600 MILLIGRAM(S): at 00:20

## 2018-04-13 RX ADMIN — Medication 1 GRAM(S): at 13:32

## 2018-04-13 RX ADMIN — Medication 600 MILLIGRAM(S): at 06:46

## 2018-04-13 RX ADMIN — Medication 5000 UNIT(S): at 13:31

## 2018-04-13 RX ADMIN — BUDESONIDE AND FORMOTEROL FUMARATE DIHYDRATE 2 PUFF(S): 160; 4.5 AEROSOL RESPIRATORY (INHALATION) at 06:37

## 2018-04-13 RX ADMIN — MONTELUKAST 10 MILLIGRAM(S): 4 TABLET, CHEWABLE ORAL at 21:05

## 2018-04-13 RX ADMIN — OXYCODONE AND ACETAMINOPHEN 1 TABLET(S): 5; 325 TABLET ORAL at 13:38

## 2018-04-13 RX ADMIN — SENNA PLUS 2 TABLET(S): 8.6 TABLET ORAL at 21:05

## 2018-04-13 RX ADMIN — HYDROMORPHONE HYDROCHLORIDE 0.5 MILLIGRAM(S): 2 INJECTION INTRAMUSCULAR; INTRAVENOUS; SUBCUTANEOUS at 12:00

## 2018-04-13 RX ADMIN — HYDROMORPHONE HYDROCHLORIDE 0.5 MILLIGRAM(S): 2 INJECTION INTRAMUSCULAR; INTRAVENOUS; SUBCUTANEOUS at 11:50

## 2018-04-13 RX ADMIN — BUDESONIDE AND FORMOTEROL FUMARATE DIHYDRATE 2 PUFF(S): 160; 4.5 AEROSOL RESPIRATORY (INHALATION) at 17:16

## 2018-04-13 RX ADMIN — Medication 100 MILLIGRAM(S): at 17:17

## 2018-04-13 RX ADMIN — OXYCODONE AND ACETAMINOPHEN 1 TABLET(S): 5; 325 TABLET ORAL at 14:20

## 2018-04-13 RX ADMIN — Medication 80 MILLIGRAM(S): at 13:41

## 2018-04-13 RX ADMIN — HYDROMORPHONE HYDROCHLORIDE 0.5 MILLIGRAM(S): 2 INJECTION INTRAMUSCULAR; INTRAVENOUS; SUBCUTANEOUS at 12:30

## 2018-04-13 RX ADMIN — OXYCODONE AND ACETAMINOPHEN 1 TABLET(S): 5; 325 TABLET ORAL at 21:30

## 2018-04-13 NOTE — PRE-OP CHECKLIST - 1.
EMOTIONAL SUPPORT AND PRE OP TEACHING PROVIDED TO PATIENT WITH VERBALIZED UNDERSTANDING.
emotional support preop teaching and unit orientation given to pt and family

## 2018-04-13 NOTE — PROGRESS NOTE ADULT - ASSESSMENT
67 male with COPD, PAD, HTN here with left lower extremity pain. S/p debridement ofr left lower extremity group a strep necrotizing fascitis.       Continue ceftriaxone.    Wound care.  Monitor leukocytosis.    When stable for discharge can change to Keflex 500 mg po q 6 through 4/23    Call with additional questions

## 2018-04-13 NOTE — PROGRESS NOTE ADULT - ASSESSMENT
67 year old male with a medical history significant for COPD, PAD, HTN here with left lower extremity pain. S/p debridement of the left lower extremity for a group A strep necrotizing soft tissue infection.    - OR today for integra  - NPO  - pain mgmt per primary team     [phone]

## 2018-04-13 NOTE — CHART NOTE - NSCHARTNOTEFT_GEN_A_CORE
POST-OP NOTE    Procedure: Graft and VAC placement to LLE by PRS    SUBJECTIVE: Patient comfortable. Pain controlled. Patient denies CP, SOB, numbness, tingling, HA, N/V.     Vital Signs Last 24 Hrs  T(C): 36.3 (13 Apr 2018 16:58), Max: 36.8 (12 Apr 2018 21:16)  T(F): 97.3 (13 Apr 2018 16:58), Max: 98.3 (12 Apr 2018 21:16)  HR: 71 (13 Apr 2018 16:58) (66 - 90)  BP: 116/72 (13 Apr 2018 16:58) (116/60 - 155/70)  BP(mean): 92 (13 Apr 2018 13:00) (92 - 95)  RR: 16 (13 Apr 2018 16:58) (14 - 20)  SpO2: 93% (13 Apr 2018 16:58) (92% - 96%)    I&O's Detail    12 Apr 2018 07:01  -  13 Apr 2018 07:00  --------------------------------------------------------  IN:    IV PiggyBack: 50 mL    Oral Fluid: 1260 mL    sodium chloride 0.9%: 1000 mL  Total IN: 2310 mL    OUT:    Voided: 1750 mL  Total OUT: 1750 mL    Total NET: 560 mL      13 Apr 2018 07:01  -  13 Apr 2018 18:06  --------------------------------------------------------  IN:    IV PiggyBack: 50 mL    Oral Fluid: 120 mL  Total IN: 170 mL    OUT:    Voided: 625 mL  Total OUT: 625 mL    Total NET: -455 mL          Physical Exam:  General Appearance: Appears well, NAD  Extremities: LLE dressing and splint in place C/D/I, VAC in place, patient able to move toes and raise leg    LABS:                        13.9   11.2  )-----------( 381      ( 12 Apr 2018 07:12 )             41.1     04-12    138  |  101  |  19  ----------------------------<  107<H>  4.4   |  25  |  1.01    Ca    9.6      12 Apr 2018 07:09  Phos  4.3     04-12  Mg     2.0     04-12      PT/INR - ( 12 Apr 2018 11:10 )   PT: 11.9 sec;   INR: 1.09 ratio         PTT - ( 12 Apr 2018 11:10 )  PTT:26.2 sec      A/P) 67M POD 0 s/p graft and VAC placement to LLE following debridement on 4/6/18 for necrotizing fascitis    -Regular diet  -IVL  -NWB to LLE  -PT eval  -VAC paperwork completed and given to CM  -No VAC changes until 4/19 as outpatient in office with Plastics  -Pain control  -C/W DVT ppx  -C/W Home meds  -ABX to be converted to PO upon discharge as per ID    Annemarie Mcmillan PA-C  p#[id number]

## 2018-04-13 NOTE — PROGRESS NOTE ADULT - ASSESSMENT
67y Male who presents with necrotizing soft tissue infection of LLE s/p debridement on 4/6    Plan:  -DVT PPX  -Pain control   -OOB as tolerated with assistance   -OR with plastics today for Integra - NPO/IVF  -Dispo planning

## 2018-04-13 NOTE — PROGRESS NOTE ADULT - ASSESSMENT
66 y/o M PMH COPD, PAD, HTN p/w chills/LLE pain, bullae adm for severe sepsis 2/2 GAS necrotizing fasciitis s/p debridement 4/6, w/ course c/b JUNAID (now resolved) now s/p integra/VAC placement 4/13.

## 2018-04-14 RX ORDER — OXYCODONE AND ACETAMINOPHEN 5; 325 MG/1; MG/1
1 TABLET ORAL ONCE
Qty: 0 | Refills: 0 | Status: DISCONTINUED | OUTPATIENT
Start: 2018-04-14 | End: 2018-04-14

## 2018-04-14 RX ADMIN — Medication 5000 UNIT(S): at 11:50

## 2018-04-14 RX ADMIN — BUDESONIDE AND FORMOTEROL FUMARATE DIHYDRATE 2 PUFF(S): 160; 4.5 AEROSOL RESPIRATORY (INHALATION) at 05:15

## 2018-04-14 RX ADMIN — OXYCODONE AND ACETAMINOPHEN 1 TABLET(S): 5; 325 TABLET ORAL at 21:23

## 2018-04-14 RX ADMIN — Medication 12.5 MILLIGRAM(S): at 05:15

## 2018-04-14 RX ADMIN — Medication 80 MILLIGRAM(S): at 05:15

## 2018-04-14 RX ADMIN — MONTELUKAST 10 MILLIGRAM(S): 4 TABLET, CHEWABLE ORAL at 21:24

## 2018-04-14 RX ADMIN — OXYCODONE AND ACETAMINOPHEN 1 TABLET(S): 5; 325 TABLET ORAL at 09:02

## 2018-04-14 RX ADMIN — Medication 1 GRAM(S): at 11:51

## 2018-04-14 RX ADMIN — SENNA PLUS 2 TABLET(S): 8.6 TABLET ORAL at 21:24

## 2018-04-14 RX ADMIN — OXYCODONE AND ACETAMINOPHEN 1 TABLET(S): 5; 325 TABLET ORAL at 11:30

## 2018-04-14 RX ADMIN — OXYCODONE AND ACETAMINOPHEN 1 TABLET(S): 5; 325 TABLET ORAL at 10:44

## 2018-04-14 RX ADMIN — ENOXAPARIN SODIUM 40 MILLIGRAM(S): 100 INJECTION SUBCUTANEOUS at 11:49

## 2018-04-14 RX ADMIN — OXYCODONE AND ACETAMINOPHEN 1 TABLET(S): 5; 325 TABLET ORAL at 22:23

## 2018-04-14 RX ADMIN — BUDESONIDE AND FORMOTEROL FUMARATE DIHYDRATE 2 PUFF(S): 160; 4.5 AEROSOL RESPIRATORY (INHALATION) at 17:32

## 2018-04-14 RX ADMIN — CEFTRIAXONE 100 GRAM(S): 500 INJECTION, POWDER, FOR SOLUTION INTRAMUSCULAR; INTRAVENOUS at 17:31

## 2018-04-14 RX ADMIN — OXYCODONE AND ACETAMINOPHEN 1 TABLET(S): 5; 325 TABLET ORAL at 09:34

## 2018-04-14 RX ADMIN — OXYCODONE AND ACETAMINOPHEN 1 TABLET(S): 5; 325 TABLET ORAL at 02:59

## 2018-04-14 RX ADMIN — OXYCODONE AND ACETAMINOPHEN 1 TABLET(S): 5; 325 TABLET ORAL at 03:30

## 2018-04-14 RX ADMIN — Medication 100 MILLIGRAM(S): at 05:15

## 2018-04-15 RX ORDER — LORATADINE 10 MG/1
10 TABLET ORAL DAILY
Qty: 0 | Refills: 0 | Status: DISCONTINUED | OUTPATIENT
Start: 2018-04-15 | End: 2018-04-16

## 2018-04-15 RX ADMIN — OXYCODONE AND ACETAMINOPHEN 1 TABLET(S): 5; 325 TABLET ORAL at 08:55

## 2018-04-15 RX ADMIN — CEFTRIAXONE 100 GRAM(S): 500 INJECTION, POWDER, FOR SOLUTION INTRAMUSCULAR; INTRAVENOUS at 17:29

## 2018-04-15 RX ADMIN — OXYCODONE AND ACETAMINOPHEN 1 TABLET(S): 5; 325 TABLET ORAL at 21:46

## 2018-04-15 RX ADMIN — Medication 100 MILLIGRAM(S): at 17:29

## 2018-04-15 RX ADMIN — Medication 80 MILLIGRAM(S): at 05:54

## 2018-04-15 RX ADMIN — OXYCODONE AND ACETAMINOPHEN 1 TABLET(S): 5; 325 TABLET ORAL at 13:45

## 2018-04-15 RX ADMIN — Medication 12.5 MILLIGRAM(S): at 05:52

## 2018-04-15 RX ADMIN — OXYCODONE AND ACETAMINOPHEN 1 TABLET(S): 5; 325 TABLET ORAL at 08:25

## 2018-04-15 RX ADMIN — OXYCODONE AND ACETAMINOPHEN 1 TABLET(S): 5; 325 TABLET ORAL at 14:15

## 2018-04-15 RX ADMIN — Medication 5000 UNIT(S): at 13:44

## 2018-04-15 RX ADMIN — OXYCODONE AND ACETAMINOPHEN 1 TABLET(S): 5; 325 TABLET ORAL at 18:13

## 2018-04-15 RX ADMIN — OXYCODONE AND ACETAMINOPHEN 1 TABLET(S): 5; 325 TABLET ORAL at 21:16

## 2018-04-15 RX ADMIN — Medication 100 MILLIGRAM(S): at 05:52

## 2018-04-15 RX ADMIN — LORATADINE 10 MILLIGRAM(S): 10 TABLET ORAL at 17:29

## 2018-04-15 RX ADMIN — OXYCODONE AND ACETAMINOPHEN 1 TABLET(S): 5; 325 TABLET ORAL at 04:50

## 2018-04-15 RX ADMIN — MONTELUKAST 10 MILLIGRAM(S): 4 TABLET, CHEWABLE ORAL at 21:16

## 2018-04-15 RX ADMIN — BUDESONIDE AND FORMOTEROL FUMARATE DIHYDRATE 2 PUFF(S): 160; 4.5 AEROSOL RESPIRATORY (INHALATION) at 05:54

## 2018-04-15 RX ADMIN — Medication 1 GRAM(S): at 13:46

## 2018-04-15 RX ADMIN — OXYCODONE AND ACETAMINOPHEN 1 TABLET(S): 5; 325 TABLET ORAL at 17:43

## 2018-04-15 RX ADMIN — ENOXAPARIN SODIUM 40 MILLIGRAM(S): 100 INJECTION SUBCUTANEOUS at 13:45

## 2018-04-15 RX ADMIN — BUDESONIDE AND FORMOTEROL FUMARATE DIHYDRATE 2 PUFF(S): 160; 4.5 AEROSOL RESPIRATORY (INHALATION) at 17:29

## 2018-04-15 RX ADMIN — OXYCODONE AND ACETAMINOPHEN 1 TABLET(S): 5; 325 TABLET ORAL at 03:50

## 2018-04-15 NOTE — PROGRESS NOTE ADULT - ASSESSMENT
67y Male who presents with necrotizing soft tissue infection of LLE s/p debridement on 4/6 and Integra graft with VAC on 4/13    Plan:  -DVT PPX  -Pain control   -PT eval today - changed rec to JERRELL  -DC on Augmentin after home vac arrives      LOW Agudelo [phone]

## 2018-04-15 NOTE — PROGRESS NOTE ADULT - ASSESSMENT
Plan  - clarification with PT on why no crutches  - home vac  - can d/c on PO abx  - f/u Dr. Moody next week - Thursday

## 2018-04-16 ENCOUNTER — TRANSCRIPTION ENCOUNTER (OUTPATIENT)
Age: 68
End: 2018-04-16

## 2018-04-16 VITALS
RESPIRATION RATE: 17 BRPM | OXYGEN SATURATION: 96 % | TEMPERATURE: 99 F | DIASTOLIC BLOOD PRESSURE: 84 MMHG | SYSTOLIC BLOOD PRESSURE: 124 MMHG | HEART RATE: 77 BPM

## 2018-04-16 PROCEDURE — 97110 THERAPEUTIC EXERCISES: CPT

## 2018-04-16 PROCEDURE — 83735 ASSAY OF MAGNESIUM: CPT

## 2018-04-16 PROCEDURE — 73620 X-RAY EXAM OF FOOT: CPT

## 2018-04-16 PROCEDURE — 99285 EMERGENCY DEPT VISIT HI MDM: CPT | Mod: 25

## 2018-04-16 PROCEDURE — 85014 HEMATOCRIT: CPT

## 2018-04-16 PROCEDURE — 84443 ASSAY THYROID STIM HORMONE: CPT

## 2018-04-16 PROCEDURE — 82435 ASSAY OF BLOOD CHLORIDE: CPT

## 2018-04-16 PROCEDURE — 87070 CULTURE OTHR SPECIMN AEROBIC: CPT

## 2018-04-16 PROCEDURE — 86900 BLOOD TYPING SEROLOGIC ABO: CPT

## 2018-04-16 PROCEDURE — 85730 THROMBOPLASTIN TIME PARTIAL: CPT

## 2018-04-16 PROCEDURE — 86901 BLOOD TYPING SEROLOGIC RH(D): CPT

## 2018-04-16 PROCEDURE — 73590 X-RAY EXAM OF LOWER LEG: CPT

## 2018-04-16 PROCEDURE — 82565 ASSAY OF CREATININE: CPT

## 2018-04-16 PROCEDURE — 86140 C-REACTIVE PROTEIN: CPT

## 2018-04-16 PROCEDURE — 82947 ASSAY GLUCOSE BLOOD QUANT: CPT

## 2018-04-16 PROCEDURE — 99233 SBSQ HOSP IP/OBS HIGH 50: CPT

## 2018-04-16 PROCEDURE — 82803 BLOOD GASES ANY COMBINATION: CPT

## 2018-04-16 PROCEDURE — 97162 PT EVAL MOD COMPLEX 30 MIN: CPT

## 2018-04-16 PROCEDURE — 97161 PT EVAL LOW COMPLEX 20 MIN: CPT

## 2018-04-16 PROCEDURE — 73610 X-RAY EXAM OF ANKLE: CPT

## 2018-04-16 PROCEDURE — 85610 PROTHROMBIN TIME: CPT

## 2018-04-16 PROCEDURE — 96374 THER/PROPH/DIAG INJ IV PUSH: CPT

## 2018-04-16 PROCEDURE — 83036 HEMOGLOBIN GLYCOSYLATED A1C: CPT

## 2018-04-16 PROCEDURE — 83605 ASSAY OF LACTIC ACID: CPT

## 2018-04-16 PROCEDURE — 99232 SBSQ HOSP IP/OBS MODERATE 35: CPT

## 2018-04-16 PROCEDURE — 73700 CT LOWER EXTREMITY W/O DYE: CPT

## 2018-04-16 PROCEDURE — 85652 RBC SED RATE AUTOMATED: CPT

## 2018-04-16 PROCEDURE — 88304 TISSUE EXAM BY PATHOLOGIST: CPT

## 2018-04-16 PROCEDURE — 96375 TX/PRO/DX INJ NEW DRUG ADDON: CPT

## 2018-04-16 PROCEDURE — 85027 COMPLETE CBC AUTOMATED: CPT

## 2018-04-16 PROCEDURE — 84100 ASSAY OF PHOSPHORUS: CPT

## 2018-04-16 PROCEDURE — 94640 AIRWAY INHALATION TREATMENT: CPT

## 2018-04-16 PROCEDURE — 80048 BASIC METABOLIC PNL TOTAL CA: CPT

## 2018-04-16 PROCEDURE — 97116 GAIT TRAINING THERAPY: CPT

## 2018-04-16 PROCEDURE — 80202 ASSAY OF VANCOMYCIN: CPT

## 2018-04-16 PROCEDURE — 84132 ASSAY OF SERUM POTASSIUM: CPT

## 2018-04-16 PROCEDURE — 99024 POSTOP FOLLOW-UP VISIT: CPT

## 2018-04-16 PROCEDURE — 84295 ASSAY OF SERUM SODIUM: CPT

## 2018-04-16 PROCEDURE — 82330 ASSAY OF CALCIUM: CPT

## 2018-04-16 PROCEDURE — 87040 BLOOD CULTURE FOR BACTERIA: CPT

## 2018-04-16 PROCEDURE — 76377 3D RENDER W/INTRP POSTPROCES: CPT

## 2018-04-16 PROCEDURE — 80053 COMPREHEN METABOLIC PANEL: CPT

## 2018-04-16 PROCEDURE — 86850 RBC ANTIBODY SCREEN: CPT

## 2018-04-16 PROCEDURE — 99261: CPT

## 2018-04-16 RX ORDER — CEPHALEXIN 500 MG
1 CAPSULE ORAL
Qty: 14 | Refills: 0 | OUTPATIENT
Start: 2018-04-16 | End: 2018-04-22

## 2018-04-16 RX ORDER — IBUPROFEN 200 MG
1 TABLET ORAL
Qty: 0 | Refills: 0 | COMMUNITY
Start: 2018-04-16

## 2018-04-16 RX ADMIN — Medication 80 MILLIGRAM(S): at 05:06

## 2018-04-16 RX ADMIN — BUDESONIDE AND FORMOTEROL FUMARATE DIHYDRATE 2 PUFF(S): 160; 4.5 AEROSOL RESPIRATORY (INHALATION) at 05:05

## 2018-04-16 RX ADMIN — OXYCODONE AND ACETAMINOPHEN 1 TABLET(S): 5; 325 TABLET ORAL at 11:31

## 2018-04-16 RX ADMIN — Medication 12.5 MILLIGRAM(S): at 05:06

## 2018-04-16 RX ADMIN — ENOXAPARIN SODIUM 40 MILLIGRAM(S): 100 INJECTION SUBCUTANEOUS at 11:50

## 2018-04-16 RX ADMIN — Medication 5000 UNIT(S): at 11:50

## 2018-04-16 RX ADMIN — OXYCODONE AND ACETAMINOPHEN 1 TABLET(S): 5; 325 TABLET ORAL at 05:51

## 2018-04-16 RX ADMIN — OXYCODONE AND ACETAMINOPHEN 1 TABLET(S): 5; 325 TABLET ORAL at 12:04

## 2018-04-16 RX ADMIN — LORATADINE 10 MILLIGRAM(S): 10 TABLET ORAL at 11:50

## 2018-04-16 RX ADMIN — OXYCODONE AND ACETAMINOPHEN 1 TABLET(S): 5; 325 TABLET ORAL at 17:10

## 2018-04-16 RX ADMIN — OXYCODONE AND ACETAMINOPHEN 1 TABLET(S): 5; 325 TABLET ORAL at 16:36

## 2018-04-16 RX ADMIN — Medication 1 GRAM(S): at 11:50

## 2018-04-16 NOTE — DISCHARGE NOTE ADULT - ADDITIONAL INSTRUCTIONS
Please follow up with Dr. Moody on Wednesday for follow up, do not remove wound vac until appointment.

## 2018-04-16 NOTE — DISCHARGE NOTE ADULT - CARE PROVIDER_API CALL
Francois Moody), Plastic Surgery; Surgery; Surgical Critical Care  [address] [place] [address]  Phone: [phone]  Fax: [phone]

## 2018-04-16 NOTE — PROGRESS NOTE ADULT - SUBJECTIVE AND OBJECTIVE BOX
GENERAL SURGERY DAILY PROGRESS NOTE:     Subjective: Patient seen and examined. Patient stable with no overnight events. Patient denies CP/ SOB/ Palpitations. Patient denies N/V. Reports flatus and BM. Reports erythema improved.     MEDICATIONS  (STANDING):  acetaminophen  IVPB. 1000 milliGRAM(s) IV Intermittent once  buDESOnide 160 MICROgram(s)/formoterol 4.5 MICROgram(s) Inhaler 2 Puff(s) Inhalation two times a day  cefTRIAXone   IVPB 2 Gram(s) IV Intermittent every 24 hours  clindamycin IVPB 600 milliGRAM(s) IV Intermittent every 8 hours  docusate sodium 100 milliGRAM(s) Oral two times a day  enoxaparin Injectable 40 milliGRAM(s) SubCutaneous daily  hydrochlorothiazide 12.5 milliGRAM(s) Oral daily  montelukast 10 milliGRAM(s) Oral daily  propranolol LA 80 milliGRAM(s) Oral daily  senna 2 Tablet(s) Oral at bedtime    MEDICATIONS  (PRN):  ALBUTerol/ipratropium for Nebulization. 3 milliLiter(s) Nebulizer once PRN Shortness of Breath and/or Wheezing  HYDROmorphone  Injectable 0.5 milliGRAM(s) IV Push once PRN Severe Pain (7 - 10)  morphine  - Injectable 4 milliGRAM(s) IV Push every 4 hours PRN Severe Pain (7 - 10)    Vital Signs Last 24 Hrs  T(C): 36.2 (09 Apr 2018 05:58), Max: 37.2 (08 Apr 2018 15:07)  T(F): 97.1 (09 Apr 2018 05:58), Max: 98.9 (08 Apr 2018 15:07)  HR: 90 (09 Apr 2018 05:58) (78 - 90)  BP: 135/77 (09 Apr 2018 05:58) (94/55 - 135/77)  BP(mean): --  RR: 16 (09 Apr 2018 05:58) (16 - 18)  SpO2: 95% (09 Apr 2018 05:58) (91% - 96%)    I&O's Detail  08 Apr 2018 07:01  -  09 Apr 2018 07:00  --------------------------------------------------------  IN:    IV PiggyBack: 250 mL    Oral Fluid: 980 mL  Total IN: 1230 mL    OUT:    Voided: 1650 mL  Total OUT: 1650 mL  Total NET: -420 mL    09 Apr 2018 07:01  -  09 Apr 2018 13:05  --------------------------------------------------------  IN:    Oral Fluid: 240 mL  Total IN: 240 mL  OUT:  Total OUT: 0 mL  Total NET: 240 mL    LABS:                     13.4   14.3  )-----------( 287      ( 09 Apr 2018 06:58 )             38.8   04-09  139  |  99  |  20  ----------------------------<  92  3.9   |  29  |  0.98    Ca    9.1      09 Apr 2018 06:59  Phos  2.4     04-07  Mg     2.3     04-07    Physical Exam:   Gen: NAD, AAox3  Abd: soft, NT, ND  left lower extremity: sensation/ motor intact   able to move toes, erythema improved.       Assessment:    67y Male who presents with Cellulitis    Plan:  -DVT PPX- Lovenox/ SQH   -Pain control   -OOB as tolerated with assistance   -Advance diet as tolerated  -Await Gi Fxn   -Dispo Planning     Annalisa Ledezma   #[id number] 04-09-18 @ 13:05
GENERAL SURGERY PROGRESS NOTE    POST OPERATIVE DAY #: 1      SUBJECTIVE: Pt seen and examined at bedside. Reports controlled pain.  Denies N/V, fever, chills, SOB, CP.     Vital Signs Last 24 Hrs  T(C): 36.9 (14 Apr 2018 09:22), Max: 37.1 (14 Apr 2018 05:10)  T(F): 98.4 (14 Apr 2018 09:22), Max: 98.8 (14 Apr 2018 05:10)  HR: 74 (14 Apr 2018 11:04) (66 - 80)  BP: 143/82 (14 Apr 2018 11:04) (115/71 - 155/70)  BP(mean): 92 (13 Apr 2018 13:00) (92 - 95)  RR: 18 (14 Apr 2018 11:04) (14 - 19)  SpO2: 94% (14 Apr 2018 11:04) (92% - 96%)    Physical Exam  General: awake, alert  Pulm: respirations unlabored, no increased WOB  Abdomen: Soft, nontender  Extremities: Dressing in place over LLE, toes well perfused    I&O's Summary    13 Apr 2018 07:01  -  14 Apr 2018 07:00  --------------------------------------------------------  IN: 170 mL / OUT: 1925 mL / NET: -1755 mL    14 Apr 2018 07:01  -  14 Apr 2018 11:24  --------------------------------------------------------  IN: 0 mL / OUT: 300 mL / NET: -300 mL      I&O's Detail    13 Apr 2018 07:01  -  14 Apr 2018 07:00  --------------------------------------------------------  IN:    IV PiggyBack: 50 mL    Oral Fluid: 120 mL  Total IN: 170 mL    OUT:    Voided: 1925 mL  Total OUT: 1925 mL    Total NET: -1755 mL      14 Apr 2018 07:01  -  14 Apr 2018 11:24  --------------------------------------------------------  IN:  Total IN: 0 mL    OUT:    Voided: 300 mL  Total OUT: 300 mL    Total NET: -300 mL          MEDICATIONS  (STANDING):  buDESOnide 160 MICROgram(s)/formoterol 4.5 MICROgram(s) Inhaler 2 Puff(s) Inhalation two times a day  cefTRIAXone   IVPB 2 Gram(s) IV Intermittent every 24 hours  cholecalciferol 5000 Unit(s) Oral daily  docusate sodium 100 milliGRAM(s) Oral two times a day  enoxaparin Injectable 40 milliGRAM(s) SubCutaneous daily  hydrochlorothiazide 12.5 milliGRAM(s) Oral daily  montelukast 10 milliGRAM(s) Oral daily  omega-3-Acid Ethyl Esters 1 Gram(s) Oral daily  propranolol LA 80 milliGRAM(s) Oral daily  senna 2 Tablet(s) Oral at bedtime    MEDICATIONS  (PRN):  ALBUTerol/ipratropium for Nebulization. 3 milliLiter(s) Nebulizer once PRN Shortness of Breath and/or Wheezing  ibuprofen  Tablet 600 milliGRAM(s) Oral every 8 hours PRN left foot pain  oxyCODONE    5 mG/acetaminophen 325 mG 1 Tablet(s) Oral every 4 hours PRN Moderate Pain (4 - 6)  oxyCODONE    5 mG/acetaminophen 325 mG 2 Tablet(s) Oral every 4 hours PRN Severe Pain (7 - 10)      LABS:                RADIOLOGY & ADDITIONAL STUDIES:
[facility] MEDICINE PROGRESS NOTE    Patient is a 67y old  Male who presents with a chief complaint of LLE cellulitis (06 Apr 2018 08:16)    SUBJECTIVE / OVERNIGHT EVENTS:  Overnight, no acute events.  Pt seen sitting up in bedside reports some tingling in his LLE. States pain is controlled.   Denies any f/chills, cp, sob, abd pain, n/v.    CAPILLARY BLOOD GLUCOSE    I&O's Summary    08 Apr 2018 07:01  -  09 Apr 2018 07:00  --------------------------------------------------------  IN: 1230 mL / OUT: 1650 mL / NET: -420 mL    09 Apr 2018 07:01  -  09 Apr 2018 14:32  --------------------------------------------------------  IN: 820 mL / OUT: 250 mL / NET: 570 mL    Vital Signs Last 24 Hrs  T(C): 36.7 (09 Apr 2018 13:59), Max: 37.2 (08 Apr 2018 15:07)  T(F): 98.1 (09 Apr 2018 13:59), Max: 98.9 (08 Apr 2018 15:07)  HR: 84 (09 Apr 2018 13:59) (78 - 90)  BP: 121/77 (09 Apr 2018 13:59) (94/55 - 135/77)  BP(mean): --  RR: 17 (09 Apr 2018 13:59) (16 - 18)  SpO2: 93% (09 Apr 2018 13:59) (91% - 96%)    PHYSICAL EXAM:  GENERAL:  Well appearing, morbidly obese M, sitting up in NAD  HEAD:  NCAT  EYES: PERRLA, conjunctiva clear  NECK: Supple  CHEST/LUNG: CTA B/L. No w/r/r.  HEART: Reg rate. Normal S1, S2. No m/r/g.   ABDOMEN: Obese, SNTND. Bowel sounds present  EXTREMITIES:  2+ Peripheral Pulses, No clubbing, cyanosis. LLE dressed in kerlix wrap w/ edema. Left toes sensation intact, able to move toes spontaneously. Cap refill wnl.   PSYCH: appropriate affect    LABS:                        13.4   14.3  )-----------( 287      ( 09 Apr 2018 06:58 )             38.8     04-09    139  |  99  |  20  ----------------------------<  92  3.9   |  29  |  0.98    Ca    9.1      09 Apr 2018 06:59  Phos  2.4     04-07  Mg     2.3     04-07    Culture - Tissue with Gram Stain (collected 07 Apr 2018 01:29)  Source: .Tissue Other, left foot tissue  Gram Stain (07 Apr 2018 06:14):    No polymorphonuclear cells seen    Few Gram positive cocci in pairs per oil power field  Preliminary Report (07 Apr 2018 19:00):    Moderate Streptococcus pyogenes (Group A) "Susceptibilities not performed"    Culture - Surgical Swab (collected 07 Apr 2018 01:29)  Source: .Surgical Swab left foot  Preliminary Report (07 Apr 2018 18:19):    Moderate Streptococcus pyogenes (Group A)    MEDICATIONS  (STANDING):  acetaminophen  IVPB. 1000 milliGRAM(s) IV Intermittent once  buDESOnide 160 MICROgram(s)/formoterol 4.5 MICROgram(s) Inhaler 2 Puff(s) Inhalation two times a day  cefTRIAXone   IVPB 2 Gram(s) IV Intermittent every 24 hours  clindamycin IVPB 600 milliGRAM(s) IV Intermittent every 8 hours  docusate sodium 100 milliGRAM(s) Oral two times a day  enoxaparin Injectable 40 milliGRAM(s) SubCutaneous daily  hydrochlorothiazide 12.5 milliGRAM(s) Oral daily  montelukast 10 milliGRAM(s) Oral daily  propranolol LA 80 milliGRAM(s) Oral daily  senna 2 Tablet(s) Oral at bedtime    MEDICATIONS  (PRN):  ALBUTerol/ipratropium for Nebulization. 3 milliLiter(s) Nebulizer once PRN Shortness of Breath and/or Wheezing  HYDROmorphone  Injectable 0.5 milliGRAM(s) IV Push once PRN Severe Pain (7 - 10)  morphine  - Injectable 4 milliGRAM(s) IV Push every 4 hours PRN Severe Pain (7 - 10)
ACS Progress Note    S: Patient seen and examined. No acute events overnight. Pain well controlled with current regimen.   Denies nausea/vomiting. Ready for OR with plastics this AM    O:  Vital Signs Last 24 Hrs  T(C): 36.8 (13 Apr 2018 05:29), Max: 36.8 (12 Apr 2018 17:11)  T(F): 98.3 (13 Apr 2018 05:29), Max: 98.3 (12 Apr 2018 17:11)  HR: 73 (13 Apr 2018 05:29) (73 - 90)  BP: 122/81 (13 Apr 2018 05:29) (121/74 - 128/74)  RR: 18 (13 Apr 2018 05:29) (18 - 18)  SpO2: 95% (13 Apr 2018 05:29) (95% - 97%)    I&O's Detail    12 Apr 2018 07:01  -  13 Apr 2018 07:00  --------------------------------------------------------  IN:    IV PiggyBack: 50 mL    Oral Fluid: 1260 mL    sodium chloride 0.9%.: 1000 mL  Total IN: 2310 mL    OUT:    Voided: 1750 mL  Total OUT: 1750 mL    Total NET: 560 mL    MEDICATIONS  (STANDING):  buDESOnide 160 MICROgram(s)/formoterol 4.5 MICROgram(s) Inhaler 2 Puff(s) Inhalation two times a day  cefTRIAXone   IVPB 2 Gram(s) IV Intermittent every 24 hours  cholecalciferol 5000 Unit(s) Oral daily  Dakins Solution - Full Strength 1 Application(s) Topical daily  docusate sodium 100 milliGRAM(s) Oral three times a day  enoxaparin Injectable 40 milliGRAM(s) SubCutaneous daily  hydrochlorothiazide 12.5 milliGRAM(s) Oral daily  montelukast 10 milliGRAM(s) Oral daily  omega-3-Acid Ethyl Esters 1 Gram(s) Oral daily  propranolol LA 80 milliGRAM(s) Oral daily  senna 2 Tablet(s) Oral at bedtime  sodium chloride 0.9%. 1000 milliLiter(s) (125 mL/Hr) IV Continuous <Continuous>    MEDICATIONS  (PRN):  ALBUTerol/ipratropium for Nebulization. 3 milliLiter(s) Nebulizer once PRN Shortness of Breath and/or Wheezing  HYDROmorphone  Injectable 0.25 milliGRAM(s) IV Push once PRN pre-med prior to dressing change  ibuprofen  Tablet 600 milliGRAM(s) Oral every 8 hours PRN left foot pain                     13.9   11.2  )-----------( 381      ( 12 Apr 2018 07:12 )             41.1     04-12    138  |  101  |  19  ----------------------------<  107<H>  4.4   |  25  |  1.01    Ca    9.6      12 Apr 2018 07:09  Phos  4.3     04-12  Mg     2.0     04-12    Physical Exam:  Gen: Laying in bed, NAD, alert and oriented.   Resp: Unlabored breathing  Abd: soft, NT, ND  LLE dressing c/d/i, patient moving all extremities    Lines:   IV: patent, in place.
ATP Progress note:    Hospital Day: 2  Post-Operative Day: 1 s/p Debridement of soft tissue of left lower leg due to necrotizing fasciitis    Subjective:  No acute overnight events.  Patient remained in PACU overnight for observation due to soft-blood pressures (SBP 90s-low 100s.)  Patient was transferred to Surgical baig this AM (SICU was consulted as a precaution last night, however the patient did not require SICU admission.)  Patient examined at bedside this afternoon; surgical wound site dressing was changed (wet-to-dry changed to adaptec and kerlix wrapping w/ abd pads and ace wrap).  Added IV push morphine for dressing change; patient required small dose (0.5mg) dilaudid push during dressing change, however pain became tolerable.  No additional issues at this time.    Objective:  T(C): 36.4 (04-07-18 @ 21:11), Max: 37.1 (04-07-18 @ 05:50)  HR: 74 (04-07-18 @ 21:11) (68 - 79)  BP: 107/61 (04-07-18 @ 21:11) (94/53 - 123/69)  RR: 18 (04-07-18 @ 21:11) (14 - 18)  SpO2: 93% (04-07-18 @ 21:11) (91% - 99%)    Labs:                      13.0   14.7  )-----------( 207      ( 07 Apr 2018 16:34 )             38.3       04-07    138  |  99  |  30<H>  ----------------------------<  139<H>  3.5   |  26  |  1.14    Ca    8.9      07 Apr 2018 16:34  Phos  2.4     04-07  Mg     2.3     04-07    TPro  7.9  /  Alb  3.3  /  TBili  1.6<H>  /  DBili  x   /  AST  32  /  ALT  46<H>  /  AlkPhos  113  04-06      I&O's Detail    06 Apr 2018 07:01  -  07 Apr 2018 07:00  --------------------------------------------------------  IN:    IV PiggyBack: 100 mL    Oral Fluid: 440 mL    Sodium Chloride 0.9% IV Bolus: 500 mL  Total IN: 1040 mL    OUT:    Voided: 1400 mL  Total OUT: 1400 mL    Total NET: -360 mL      07 Apr 2018 07:01  -  08 Apr 2018 00:23  --------------------------------------------------------  IN:    IV PiggyBack: 350 mL    Oral Fluid: 1300 mL  Total IN: 1650 mL    OUT:    Voided: 750 mL  Total OUT: 750 mL    Total NET: 900 mL    Focused Physical Exam:  General: NAD  Respiratory: Nonlabored breathing  Extremities: LLE midshin down to dorsum of L ft (proximal to digits) debrided w/ tendons and muscles exposed; tissue has fresh bright red blood (appears healthy), dressing changed by team (Adaptec directly on wound, covered by abd pads and wrapped in kerlix which was then wrapped w/ Ace-wrap.)  Wound site extremely tender (appropriate).  Rash site on LLE previously marked w/ skin marker; erythema is regressing well.    Medications:    04-06-18 @ 07:01  -  04-07-18 @ 07:00  --------------------------------------------------------  IN: 1040 mL / OUT: 1400 mL / NET: -360 mL    04-07-18 @ 07:01 - 04-08-18 @ 00:23  --------------------------------------------------------  IN: 1650 mL / OUT: 750 mL / NET: 900 mL
ATP Progress note:    Hospital Day: 3  Post-Operative Day: 2 s/p Debridement of soft tissue of left lower leg due to necrotizing fasciitis    Subjective:  No acute overnight events. Patient examined at bedside this AM, resting.  No acute issues at this time.  Will change dressing this afternoon.    Objective:  T(C): 37.2 (04-08-18 @ 15:07), Max: 37.2 (04-08-18 @ 15:07)  HR: 82 (04-08-18 @ 15:07) (64 - 82)  BP: 111/64 (04-08-18 @ 15:07) (98/59 - 121/73)  RR: 18 (04-08-18 @ 15:07) (18 - 18)  SpO2: 91% (04-08-18 @ 15:07) (91% - 95%)    Labs:                      12.7   11.4  )-----------( 224      ( 08 Apr 2018 05:29 )             37.6       04-08    137  |  98  |  24<H>  ----------------------------<  113<H>  3.7   |  30  |  1.11    Ca    8.8      08 Apr 2018 05:29  Phos  2.4     04-07  Mg     2.3     04-07        I&O's Detail    07 Apr 2018 07:01  -  08 Apr 2018 07:00  --------------------------------------------------------  IN:    IV PiggyBack: 1250 mL    Oral Fluid: 1300 mL  Total IN: 2550 mL    OUT:    Voided: 1400 mL  Total OUT: 1400 mL    Total NET: 1150 mL      08 Apr 2018 07:01  -  08 Apr 2018 17:26  --------------------------------------------------------  IN:    Oral Fluid: 540 mL  Total IN: 540 mL    OUT:  Total OUT: 0 mL    Total NET: 540 mL    Focused Physical Exam:  General: NAD  Respiratory: Nonlabored breathing  Extremities: LLE midshin down to dorsum of L ft (proximal to digits) debrided w/ tendons and muscles exposed; tissue has fresh bright red blood (appears healthy), dressing changed by team (Adaptec directly on wound, covered by abd pads and wrapped in kerlix which was then wrapped w/ Ace-wrap.)  Wound site extremely tender (appropriate).  Rash site on LLE previously marked w/ skin marker; erythema is regressing well.      Medications:    04-07-18 @ 07:01  -  04-08-18 @ 07:00  --------------------------------------------------------  IN: 2550 mL / OUT: 1400 mL / NET: 1150 mL    04-08-18 @ 07:01  -  04-08-18 @ 17:26  --------------------------------------------------------  IN: 540 mL / OUT: 0 mL / NET: 540 mL
Afeb VSS  VAC in place  Toes perfused    Elevation  Non weightbearing left foot  Crutches only    DC plan w VAC first change Thursday in my office  VNS only to evaluate for vac function  Cont ABX upon DC also
Feels better  Swelling and cellulitis improved    Cont BID wound Care  OR tomorrow for wound bed preparation, placement of integra and VAC  Can likely be discharged with VAC will be non weightbearing left leg and need crutches and training
Follow Up:      Inverval History/ROS:Patient is a 67y old  Male who presents with a chief complaint of LLE cellulitis (06 Apr 2018 08:16)    No fever  No events    Allergies    No Known Allergies    Intolerances        ANTIMICROBIALS:  cefTRIAXone   IVPB 2 every 24 hours      OTHER MEDS:  ALBUTerol/ipratropium for Nebulization. 3 milliLiter(s) Nebulizer once PRN  buDESOnide 160 MICROgram(s)/formoterol 4.5 MICROgram(s) Inhaler 2 Puff(s) Inhalation two times a day  cholecalciferol 5000 Unit(s) Oral daily  Dakins Solution - Full Strength 1 Application(s) Topical daily  docusate sodium 100 milliGRAM(s) Oral two times a day  enoxaparin Injectable 40 milliGRAM(s) SubCutaneous daily  hydrochlorothiazide 12.5 milliGRAM(s) Oral daily  HYDROmorphone  Injectable 0.25 milliGRAM(s) IV Push once PRN  ibuprofen  Tablet 600 milliGRAM(s) Oral every 8 hours PRN  montelukast 10 milliGRAM(s) Oral daily  omega-3-Acid Ethyl Esters 1 Gram(s) Oral daily  propranolol LA 80 milliGRAM(s) Oral daily  senna 2 Tablet(s) Oral at bedtime  sodium chloride 0.9%. 1000 milliLiter(s) IV Continuous <Continuous>      Vital Signs Last 24 Hrs  T(C): 36.7 (12 Apr 2018 09:09), Max: 36.8 (11 Apr 2018 13:21)  T(F): 98 (12 Apr 2018 09:09), Max: 98.2 (11 Apr 2018 13:21)  HR: 82 (12 Apr 2018 09:09) (68 - 90)  BP: 126/75 (12 Apr 2018 09:09) (117/68 - 133/86)  BP(mean): --  RR: 18 (12 Apr 2018 09:09) (16 - 18)  SpO2: 97% (12 Apr 2018 09:09) (94% - 97%)    PHYSICAL EXAM:  General: [x ] non-toxic  HEAD/EYES: [ ] PERRL [x ] white sclera [ ] icterus  ENT:  [ ] normal [x ] supple [ ] thrush [ ] pharyngeal exudate  Cardiovascular:   [ ] murmur [x ] normal [ ] PPM/AICD  Respiratory:  [x ] clear to ausculation bilaterally  GI:  [x ] soft, non-tender, normal bowel sounds  :  [ ] ordonez [ ] no CVA tenderness   Musculoskeletal:  [ ] no synovitis  Neurologic:  [x ] non-focal exam   Skin:  x[ ]dressing to left foot  Lymph: [ ] no lymphadenopathy  Psychiatric:  x[ ] appropriate affect [ ] alert & oriented  Lines:  [x ] no phlebitis [ ] central line                                13.9   11.2  )-----------( 381      ( 12 Apr 2018 07:12 )             41.1       04-12    138  |  101  |  19  ----------------------------<  107<H>  4.4   |  25  |  1.01    Ca    9.6      12 Apr 2018 07:09  Phos  4.3     04-12  Mg     2.0     04-12            MICROBIOLOGY:Culture Results:   Moderate Streptococcus pyogenes (Group A) (04-07-18 @ 01:29)  Culture Results:   Moderate Streptococcus pyogenes (Group A) "Susceptibilities not performed" (04-07-18 @ 01:29)  Culture Results:   No growth at 5 days. (04-06-18 @ 05:04)  Culture Results:   No growth at 5 days. (04-06-18 @ 05:04)      RADIOLOGY:
Follow Up:      Inverval History/ROS:Patient is a 67y old  Male who presents with a chief complaint of LLE cellulitis (06 Apr 2018 08:16)    S/p debridement for nec fas.    doing well  No fever.        Allergies    All nuts (Anaphylaxis)  No Known Drug Allergies  shellfish (Anaphylaxis)    Intolerances        ANTIMICROBIALS:  cefTRIAXone   IVPB 2 every 24 hours  clindamycin IVPB 600 every 8 hours      OTHER MEDS:  acetaminophen  IVPB. 1000 milliGRAM(s) IV Intermittent once  ALBUTerol/ipratropium for Nebulization. 3 milliLiter(s) Nebulizer once PRN  buDESOnide 160 MICROgram(s)/formoterol 4.5 MICROgram(s) Inhaler 2 Puff(s) Inhalation two times a day  Dakins Solution - Full Strength 1 Application(s) Topical daily  docusate sodium 100 milliGRAM(s) Oral two times a day  enoxaparin Injectable 40 milliGRAM(s) SubCutaneous daily  hydrochlorothiazide 12.5 milliGRAM(s) Oral daily  montelukast 10 milliGRAM(s) Oral daily  propranolol LA 80 milliGRAM(s) Oral daily  senna 2 Tablet(s) Oral at bedtime      Vital Signs Last 24 Hrs  T(C): 36.9 (11 Apr 2018 09:14), Max: 36.9 (11 Apr 2018 09:14)  T(F): 98.4 (11 Apr 2018 09:14), Max: 98.4 (11 Apr 2018 09:14)  HR: 84 (11 Apr 2018 09:14) (72 - 91)  BP: 139/83 (11 Apr 2018 09:14) (122/74 - 139/83)  BP(mean): --  RR: 18 (11 Apr 2018 09:14) (14 - 18)  SpO2: 93% (11 Apr 2018 09:14) (93% - 97%)    PHYSICAL EXAM:  General: [x ] non-toxic  HEAD/EYES: [ ] PERRL [x ] white sclera [ ] icterus  ENT:  [ ] normal [x ] supple [ ] thrush [ ] pharyngeal exudate  Cardiovascular:   [ ] murmur [ x] normal [ ] PPM/AICD  Respiratory:  [x ] clear to ausculation bilaterally  GI:  [x ] soft, non-tender, normal bowel sounds  :  [ ] ordonez [ ] no CVA tenderness   Musculoskeletal:  [ ] no synovitis  Neurologic:  [x ] non-focal exam   Skin:  [x ] dressing to left foot  Lymph: [x ] no lymphadenopathy  Psychiatric:  [ ] appropriate affect [x ] alert & oriented  Lines:  [x ] no phlebitis [ ] central line                                13.9   12.6  )-----------( 344      ( 11 Apr 2018 06:50 )             40.9       04-11    140  |  98  |  19  ----------------------------<  102<H>  4.1   |  28  |  0.91    Ca    9.9      11 Apr 2018 06:50  Phos  3.8     04-11  Mg     2.0     04-11            MICROBIOLOGY:Culture Results:   Moderate Streptococcus pyogenes (Group A) (04-07-18 @ 01:29)  Culture Results:   Moderate Streptococcus pyogenes (Group A) "Susceptibilities not performed" (04-07-18 @ 01:29)  Culture Results:   No growth at 5 days. (04-06-18 @ 05:04)  Culture Results:   No growth at 5 days. (04-06-18 @ 05:04)      RADIOLOGY:
Follow Up:      Inverval History/ROS:Patient is a 67y old  Male who presents with a chief complaint of LLE cellulitis (06 Apr 2018 08:16)  Feels better. Pain controlled. No fever    Allergies    All nuts (Anaphylaxis)  No Known Drug Allergies  shellfish (Anaphylaxis)    Intolerances        ANTIMICROBIALS:  cefTRIAXone   IVPB 2 every 24 hours  clindamycin IVPB 600 every 8 hours      OTHER MEDS:  acetaminophen  IVPB. 1000 milliGRAM(s) IV Intermittent once  ALBUTerol/ipratropium for Nebulization. 3 milliLiter(s) Nebulizer once PRN  buDESOnide 160 MICROgram(s)/formoterol 4.5 MICROgram(s) Inhaler 2 Puff(s) Inhalation two times a day  docusate sodium 100 milliGRAM(s) Oral two times a day  enoxaparin Injectable 40 milliGRAM(s) SubCutaneous daily  hydrochlorothiazide 12.5 milliGRAM(s) Oral daily  HYDROmorphone  Injectable 0.5 milliGRAM(s) IV Push once PRN  montelukast 10 milliGRAM(s) Oral daily  morphine  - Injectable 4 milliGRAM(s) IV Push every 4 hours PRN  propranolol LA 80 milliGRAM(s) Oral daily  senna 2 Tablet(s) Oral at bedtime      Vital Signs Last 24 Hrs  T(C): 36.2 (09 Apr 2018 05:58), Max: 37.2 (08 Apr 2018 15:07)  T(F): 97.1 (09 Apr 2018 05:58), Max: 98.9 (08 Apr 2018 15:07)  HR: 90 (09 Apr 2018 05:58) (78 - 90)  BP: 135/77 (09 Apr 2018 05:58) (94/55 - 135/77)  BP(mean): --  RR: 16 (09 Apr 2018 05:58) (16 - 18)  SpO2: 95% (09 Apr 2018 05:58) (91% - 96%)    PHYSICAL EXAM:  General: [x ] non-toxic  HEAD/EYES: [ ] PERRL [x ] white sclera [ ] icterus  ENT:  [ ] normal [x ] supple [ ] thrush [ ] pharyngeal exudate  Cardiovascular:   [ ] murmur [x ] normal [ ] PPM/AICD  Respiratory:  [ x] clear to ausculation bilaterally  GI:  [x ] soft, non-tender, normal bowel sounds  :  [ ] ordonez [ ] no CVA tenderness   Musculoskeletal:  [ ] no synovitis  Neurologic:  [x ] non-focal exam   Skin:  [ ] dresssing to left foot  Lymph: [ ] no lymphadenopathy  Psychiatric:  [ ] appropriate affect [x ] alert & oriented  Lines:  [x ] no phlebitis [ ] central line                                13.4   14.3  )-----------( 287      ( 09 Apr 2018 06:58 )             38.8       04-09    139  |  99  |  20  ----------------------------<  92  3.9   |  29  |  0.98    Ca    9.1      09 Apr 2018 06:59  Phos  2.4     04-07  Mg     2.3     04-07            MICROBIOLOGY:Culture Results:   Moderate Streptococcus pyogenes (Group A) (04-07-18 @ 01:29)  Culture Results:   Moderate Streptococcus pyogenes (Group A) "Susceptibilities not performed" (04-07-18 @ 01:29)  Culture Results:   No growth to date. (04-06-18 @ 05:04)  Culture Results:   No growth to date. (04-06-18 @ 05:04)      RADIOLOGY:
Follow Up:      Inverval History/ROS:Patient is a 67y old  Male who presents with a chief complaint of LLE cellulitis (06 Apr 2018 08:16)  No fever.   No events.        Allergies    No Known Allergies    Intolerances        ANTIMICROBIALS:  cefTRIAXone   IVPB 2 every 24 hours      OTHER MEDS:  ALBUTerol/ipratropium for Nebulization. 3 milliLiter(s) Nebulizer once PRN  buDESOnide 160 MICROgram(s)/formoterol 4.5 MICROgram(s) Inhaler 2 Puff(s) Inhalation two times a day  cholecalciferol 5000 Unit(s) Oral daily  docusate sodium 100 milliGRAM(s) Oral two times a day  enoxaparin Injectable 40 milliGRAM(s) SubCutaneous daily  hydrochlorothiazide 12.5 milliGRAM(s) Oral daily  ibuprofen  Tablet 600 milliGRAM(s) Oral every 8 hours PRN  loratadine 10 milliGRAM(s) Oral daily  montelukast 10 milliGRAM(s) Oral daily  omega-3-Acid Ethyl Esters 1 Gram(s) Oral daily  oxyCODONE    5 mG/acetaminophen 325 mG 1 Tablet(s) Oral every 4 hours PRN  oxyCODONE    5 mG/acetaminophen 325 mG 2 Tablet(s) Oral every 4 hours PRN  propranolol LA 80 milliGRAM(s) Oral daily  senna 2 Tablet(s) Oral at bedtime      Vital Signs Last 24 Hrs  T(C): 37.2 (16 Apr 2018 09:02), Max: 37.2 (16 Apr 2018 05:04)  T(F): 98.9 (16 Apr 2018 09:02), Max: 98.9 (16 Apr 2018 05:04)  HR: 77 (16 Apr 2018 09:02) (66 - 80)  BP: 115/69 (16 Apr 2018 09:02) (111/68 - 133/83)  BP(mean): --  RR: 18 (16 Apr 2018 09:02) (16 - 18)  SpO2: 95% (16 Apr 2018 09:02) (94% - 98%)    PHYSICAL EXAM:  General: [x ] non-toxic  HEAD/EYES: [ ] PERRL [x ] white sclera [ ] icterus  ENT:  [ ] normal [x ] supple [ ] thrush [ ] pharyngeal exudate  Cardiovascular:   [ ] murmur [x ] normal [ ] PPM/AICD  Respiratory:  [x ] clear to ausculation bilaterally  GI:  [x ] soft, non-tender, normal bowel sounds  :  [ ] ordonez [ ] no CVA tenderness   Musculoskeletal:  [x ] no synovitis  Neurologic:  [x ] non-focal exam   Skin:  [x ] vac to left foot  Lymph: [ ] no lymphadenopathy  Psychiatric:  [x ] appropriate affect [ ] alert & oriented  Lines:  x[ ] no phlebitis [ ] central line                          MICROBIOLOGY:    RADIOLOGY:
Follow Up:      Inverval History/ROS:Patient is a 67y old  Male who presents with a chief complaint of LLE cellulitis (06 Apr 2018 08:16)  Pain is controlled.   No events      Allergies    All nuts (Anaphylaxis)  No Known Drug Allergies  shellfish (Anaphylaxis)    Intolerances        ANTIMICROBIALS:  cefTRIAXone   IVPB 2 every 24 hours  clindamycin IVPB 600 every 8 hours      OTHER MEDS:  acetaminophen  IVPB. 1000 milliGRAM(s) IV Intermittent once  ALBUTerol/ipratropium for Nebulization. 3 milliLiter(s) Nebulizer once PRN  buDESOnide 160 MICROgram(s)/formoterol 4.5 MICROgram(s) Inhaler 2 Puff(s) Inhalation two times a day  Dakins Solution - Full Strength 1 Application(s) Topical daily  docusate sodium 100 milliGRAM(s) Oral two times a day  enoxaparin Injectable 40 milliGRAM(s) SubCutaneous daily  hydrochlorothiazide 12.5 milliGRAM(s) Oral daily  HYDROmorphone  Injectable 0.5 milliGRAM(s) IV Push once PRN  montelukast 10 milliGRAM(s) Oral daily  morphine  - Injectable 4 milliGRAM(s) IV Push every 4 hours PRN  propranolol LA 80 milliGRAM(s) Oral daily  senna 2 Tablet(s) Oral at bedtime      Vital Signs Last 24 Hrs  T(C): 36.6 (10 Apr 2018 05:36), Max: 37.4 (09 Apr 2018 16:51)  T(F): 97.9 (10 Apr 2018 05:36), Max: 99.3 (09 Apr 2018 16:51)  HR: 80 (10 Apr 2018 05:36) (80 - 86)  BP: 121/70 (10 Apr 2018 05:36) (102/68 - 121/77)  BP(mean): --  RR: 16 (10 Apr 2018 05:36) (16 - 17)  SpO2: 95% (10 Apr 2018 05:36) (93% - 96%)    PHYSICAL EXAM:  General: [x ] non-toxic  HEAD/EYES: [ ] PERRL [ x] white sclera [ ] icterus  ENT:  [ ] normal [x ] supple [ ] thrush [ ] pharyngeal exudate  Cardiovascular:   [ ] murmur x[ ] normal [ ] PPM/AICD  Respiratory:  [x ] clear to ausculation bilaterally  GI:  [x ] soft, non-tender, normal bowel sounds  :  [ ] ordonez [ ] no CVA tenderness   Musculoskeletal:  [ ] no synovitis  Neurologic:  [x ] non-focal exam   Skin:  [ x] no rash  Lymph: [ ] no lymphadenopathy  Psychiatric:  [ ] appropriate affect [ ] alert & oriented  Lines:  [x ] no phlebitis [ ] central line                                13.5   15.3  )-----------( 299      ( 10 Apr 2018 07:19 )             40.3       04-10    137  |  97  |  19  ----------------------------<  103<H>  3.8   |  27  |  0.96    Ca    9.1      10 Apr 2018 07:18  Phos  2.9     04-10  Mg     2.1     04-10            MICROBIOLOGY:Culture Results:   Moderate Streptococcus pyogenes (Group A) (04-07-18 @ 01:29)  Culture Results:   Moderate Streptococcus pyogenes (Group A) "Susceptibilities not performed" (04-07-18 @ 01:29)  Culture Results:   No growth to date. (04-06-18 @ 05:04)  Culture Results:   No growth to date. (04-06-18 @ 05:04)      RADIOLOGY:
Follow Up:      Inverval History/ROS:Patient is a 67y old  Male who presents with a chief complaint of LLE cellulitis (06 Apr 2018 08:16)  s/p vac.  No fever      Allergies    No Known Allergies    Intolerances        ANTIMICROBIALS:  cefTRIAXone   IVPB 2 every 24 hours      OTHER MEDS:  ALBUTerol/ipratropium for Nebulization. 3 milliLiter(s) Nebulizer once PRN  buDESOnide 160 MICROgram(s)/formoterol 4.5 MICROgram(s) Inhaler 2 Puff(s) Inhalation two times a day  cholecalciferol 5000 Unit(s) Oral daily  docusate sodium 100 milliGRAM(s) Oral two times a day  enoxaparin Injectable 40 milliGRAM(s) SubCutaneous daily  hydrochlorothiazide 12.5 milliGRAM(s) Oral daily  ibuprofen  Tablet 600 milliGRAM(s) Oral every 8 hours PRN  montelukast 10 milliGRAM(s) Oral daily  omega-3-Acid Ethyl Esters 1 Gram(s) Oral daily  propranolol LA 80 milliGRAM(s) Oral daily  senna 2 Tablet(s) Oral at bedtime      Vital Signs Last 24 Hrs  T(C): 36.4 (13 Apr 2018 15:03), Max: 36.8 (12 Apr 2018 17:11)  T(F): 97.5 (13 Apr 2018 15:03), Max: 98.3 (12 Apr 2018 17:11)  HR: 69 (13 Apr 2018 15:03) (66 - 90)  BP: 116/60 (13 Apr 2018 15:03) (116/60 - 155/70)  BP(mean): 92 (13 Apr 2018 13:00) (92 - 95)  RR: 18 (13 Apr 2018 15:03) (14 - 20)  SpO2: 93% (13 Apr 2018 15:03) (92% - 96%)    PHYSICAL EXAM:  General: x[ ] non-toxic  HEAD/EYES: [ ] PERRL [ x] white sclera [ ] icterus  ENT:  [ ] normal [x ] supple [ ] thrush [ ] pharyngeal exudate  Cardiovascular:   x[ ] murmur [x ] normal [ ] PPM/AICD  Respiratory:  x[ ] clear to ausculation bilaterally  GI:  [x ] soft, non-tender, normal bowel sounds  :  [ ] ordonez [ ] no CVA tenderness   Musculoskeletal:  [ ] no synovitis  Neurologic:  [ ] non-focal exam   Skin:  [ ] no rash  Lymph: [x ] no lymphadenopathy  Psychiatric:  x[ ] appropriate affect [ ] alert & oriented  Lines:  [x ] no phlebitis [ ] central line                                13.9   11.2  )-----------( 381      ( 12 Apr 2018 07:12 )             41.1       04-12    138  |  101  |  19  ----------------------------<  107<H>  4.4   |  25  |  1.01    Ca    9.6      12 Apr 2018 07:09  Phos  4.3     04-12  Mg     2.0     04-12            MICROBIOLOGY:Culture Results:   Moderate Streptococcus pyogenes (Group A) (04-07-18 @ 01:29)  Culture Results:   Moderate Streptococcus pyogenes (Group A) "Susceptibilities not performed" (04-07-18 @ 01:29)      RADIOLOGY:
Follow Up:  LLE necrotizing fascitis    Interval History/ROS: s/p debridement of LLE POd #1. Post op had SBP in 90's with drop in H/H, but responded to 500 cc bolus    Allergies  All nuts (Anaphylaxis)  No Known Drug Allergies  shellfish (Anaphylaxis)        ANTIMICROBIALS:  clindamycin IVPB 600 every 8 hours  meropenem  IVPB 1000 every 8 hours  vancomycin  IVPB 1000 every 8 hours      OTHER MEDS:  MEDICATIONS  (STANDING):  heparin  Injectable 5000 every 8 hours      Vital Signs Last 24 Hrs  T(C): 36.6 (07 Apr 2018 08:15), Max: 37.1 (07 Apr 2018 05:50)  T(F): 97.9 (07 Apr 2018 08:15), Max: 98.8 (07 Apr 2018 05:50)  HR: 75 (07 Apr 2018 08:15) (63 - 86)  BP: 123/69 (07 Apr 2018 08:15) (91/52 - 123/69)  BP(mean): 90 (07 Apr 2018 05:50) (65 - 90)  RR: 16 (07 Apr 2018 08:15) (14 - 20)  SpO2: 98% (07 Apr 2018 08:15) (92% - 100%)    PHYSICAL EXAM:  General: non-toxic on oxygen via nasal canula, sitting up in bed morbidly obese  HEAD/EYES: anicteric, PERRL  ENT:  supple  Cardiovascular:   S1, S2  Respiratory:  clear bilaterally  GI:  soft, non-tender, normal bowel sounds  :  no CVA tenderness   Musculoskeletal:  no synovitis  Neurologic:  grossly non-focal  Skin: LLE erythema till mid calf not extending line of demarcation, +edema non tender, post op dressing with some serosanguinous saturation  Lymph: no lymphadenopathy  Psychiatric:  appropriate affect  Vascular:  no phlebitis                                13.0   13.9  )-----------( 165      ( 07 Apr 2018 05:43 )             38.8       04-07    134<L>  |  98  |  35<H>  ----------------------------<  176<H>  3.7   |  24  |  1.15    Ca    8.6      07 Apr 2018 05:43    TPro  7.9  /  Alb  3.3  /  TBili  1.6<H>  /  DBili  x   /  AST  32  /  ALT  46<H>  /  AlkPhos  113  04-06          MICROBIOLOGY:  v  .Tissue Other, left foot tissue  04-07-18 --  --    No polymorphonuclear cells seen  Few Gram positive cocci in pairs per oil power field      .Blood Blood-Peripheral  04-06-18   No growth to date.  --  --                RADIOLOGY:
Follow Up:  LLE necrotizing fascitis s/p debridement POD # 2    Interval History/ROS: afebrile, LLE pain controlled, had dressing change yesterday has exposed tendon will likely need graft    Allergies  All nuts (Anaphylaxis)  No Known Drug Allergies  shellfish (Anaphylaxis)        ANTIMICROBIALS:  clindamycin IVPB 600 every 8 hours  meropenem  IVPB 1000 every 8 hours  vancomycin  IVPB 1000 every 8 hours      OTHER MEDS:  MEDICATIONS  (STANDING):  acetaminophen  IVPB. 1000 once  ALBUTerol/ipratropium for Nebulization. 3 once PRN  docusate sodium 100 two times a day  enoxaparin Injectable 40 daily  fluticasone propionate/ salmeterol 250-50 MICROgram(s) Diskus 1 two times a day  hydrochlorothiazide 12.5 daily  montelukast 10 daily  morphine  - Injectable 4 every 4 hours PRN  propranolol LA 80 daily  senna 2 at bedtime      Vital Signs Last 24 Hrs  T(C): 36.4 (08 Apr 2018 09:19), Max: 36.6 (07 Apr 2018 10:30)  T(F): 97.6 (08 Apr 2018 09:19), Max: 97.9 (07 Apr 2018 10:30)  HR: 76 (08 Apr 2018 09:19) (64 - 79)  BP: 98/59 (08 Apr 2018 09:19) (98/59 - 121/73)  BP(mean): --  RR: 18 (08 Apr 2018 09:19) (16 - 18)  SpO2: 95% (08 Apr 2018 09:19) (91% - 95%)    PHYSICAL EXAM:  General: non-toxic  HEAD/EYES: anicteric, PERRL  ENT:  supple  Cardiovascular:   S1, S2  Respiratory:  clear bilaterally  GI:  soft, non-tender, normal bowel sounds  :  no CVA tenderness   Musculoskeletal:  no synovitis  Neurologic:  grossly non-focal  Skin:  LLE ACe wrap dry and intact, swelling of calf and regression of erythema, nontender  Lymph: no lymphadenopathy  Psychiatric:  appropriate affect  Vascular:  no phlebitis                                12.7   11.4  )-----------( 224      ( 08 Apr 2018 05:29 )             37.6       04-08    137  |  98  |  24<H>  ----------------------------<  113<H>  3.7   |  30  |  1.11    Ca    8.8      08 Apr 2018 05:29  Phos  2.4     04-07  Mg     2.3     04-07            MICROBIOLOGY:  Vancomycin Level, Trough: 12.3 ug/mL (04-07-18 @ 18:42)  v  .Surgical Swab left foot  04-07-18   Moderate Streptococcus pyogenes (Group A)  --    No polymorphonuclear cells seen  Few Gram positive cocci in pairs per oil power field      .Blood Blood-Peripheral  04-06-18   No growth to date.  --  --                RADIOLOGY:
GENERAL SURGERY DAILY PROGRESS NOTE:     Subjective: Patient seen and examined. Patient stable with no overnight events. Patient denies CP/ SOB/ Palpatations. Patient denies N/V. Reports flatus and BM.     MEDICATIONS  (STANDING):  acetaminophen  IVPB. 1000 milliGRAM(s) IV Intermittent once  buDESOnide 160 MICROgram(s)/formoterol 4.5 MICROgram(s) Inhaler 2 Puff(s) Inhalation two times a day  cefTRIAXone   IVPB 2 Gram(s) IV Intermittent every 24 hours  clindamycin IVPB 600 milliGRAM(s) IV Intermittent every 8 hours  Dakins Solution - Full Strength 1 Application(s) Topical daily  docusate sodium 100 milliGRAM(s) Oral two times a day  enoxaparin Injectable 40 milliGRAM(s) SubCutaneous daily  hydrochlorothiazide 12.5 milliGRAM(s) Oral daily  montelukast 10 milliGRAM(s) Oral daily  propranolol LA 80 milliGRAM(s) Oral daily  senna 2 Tablet(s) Oral at bedtime    MEDICATIONS  (PRN):  ALBUTerol/ipratropium for Nebulization. 3 milliLiter(s) Nebulizer once PRN Shortness of Breath and/or Wheezing  HYDROmorphone  Injectable 0.5 milliGRAM(s) IV Push once PRN Severe Pain (7 - 10)      Vital Signs Last 24 Hrs  T(C): 36.6 (11 Apr 2018 05:27), Max: 36.7 (11 Apr 2018 01:07)  T(F): 97.8 (11 Apr 2018 05:27), Max: 98.1 (11 Apr 2018 01:07)  HR: 79 (11 Apr 2018 05:27) (72 - 91)  BP: 125/86 (11 Apr 2018 05:27) (122/74 - 137/-)  BP(mean): --  RR: 16 (11 Apr 2018 05:27) (14 - 18)  SpO2: 97% (11 Apr 2018 05:27) (93% - 98%)    I&O's Detail    09 Apr 2018 07:01  -  10 Apr 2018 07:00  --------------------------------------------------------  IN:    IV PiggyBack: 150 mL    Oral Fluid: 1700 mL  Total IN: 1850 mL    OUT:    Voided: 1350 mL  Total OUT: 1350 mL    Total NET: 500 mL      10 Apr 2018 07:01  -  11 Apr 2018 06:33  --------------------------------------------------------  IN:    IV PiggyBack: 150 mL    Oral Fluid: 720 mL  Total IN: 870 mL    OUT:    Voided: 1100 mL  Total OUT: 1100 mL    Total NET: -230 mL      LABS:                        13.5   15.3  )-----------( 299      ( 10 Apr 2018 07:19 )             40.3     04-10    137  |  97  |  19  ----------------------------<  103<H>  3.8   |  27  |  0.96    Ca    9.1      10 Apr 2018 07:18  Phos  2.9     04-10  Mg     2.1     04-10    Physical Exam:   Gen: NAD, AAOx3  Abd: soft, NT, ND  Erythema improved around right thigh      Assessment:    67y Male who presents with Cellulitis    Plan:  -DVT PPX  -Pain control   -OOB as tolerated with assistance   -Diet as tolerated  -OR with plastics on Friday   -Await Gi Fxn   -Dispo Planning     Annalisa Ledezma   #[id number] 04-11-18 @ 06:33
GENERAL SURGERY PROGRESS NOTE          SUBJECTIVE: Pt seen and examined at bedside. TIFFANY o/n.   Denies N/V, fever, chills, SOB, CP.     Vital Signs Last 24 Hrs  T(C): 37.1 (15 Apr 2018 10:31), Max: 37.3 (14 Apr 2018 17:17)  T(F): 98.7 (15 Apr 2018 10:31), Max: 99.2 (14 Apr 2018 17:17)  HR: 66 (15 Apr 2018 10:31) (65 - 77)  BP: 129/77 (15 Apr 2018 10:31) (125/74 - 148/86)  BP(mean): --  RR: 16 (15 Apr 2018 10:31) (16 - 18)  SpO2: 94% (15 Apr 2018 10:31) (92% - 95%)    Physical Exam  General: awake, alert  Pulm: respirations unlabored, no increased WOB  Abdomen: Soft, nontender  Extremities: Dressing in place over LLE    I&O's Summary    14 Apr 2018 07:01  -  15 Apr 2018 07:00  --------------------------------------------------------  IN: 1010 mL / OUT: 2875 mL / NET: -1865 mL      I&O's Detail    14 Apr 2018 07:01  -  15 Apr 2018 07:00  --------------------------------------------------------  IN:    IV PiggyBack: 50 mL    Oral Fluid: 960 mL  Total IN: 1010 mL    OUT:    VAC (Vacuum Assisted Closure) System: 50 mL    Voided: 2825 mL  Total OUT: 2875 mL    Total NET: -1865 mL          MEDICATIONS  (STANDING):  buDESOnide 160 MICROgram(s)/formoterol 4.5 MICROgram(s) Inhaler 2 Puff(s) Inhalation two times a day  cefTRIAXone   IVPB 2 Gram(s) IV Intermittent every 24 hours  cholecalciferol 5000 Unit(s) Oral daily  docusate sodium 100 milliGRAM(s) Oral two times a day  enoxaparin Injectable 40 milliGRAM(s) SubCutaneous daily  hydrochlorothiazide 12.5 milliGRAM(s) Oral daily  montelukast 10 milliGRAM(s) Oral daily  omega-3-Acid Ethyl Esters 1 Gram(s) Oral daily  propranolol LA 80 milliGRAM(s) Oral daily  senna 2 Tablet(s) Oral at bedtime    MEDICATIONS  (PRN):  ALBUTerol/ipratropium for Nebulization. 3 milliLiter(s) Nebulizer once PRN Shortness of Breath and/or Wheezing  ibuprofen  Tablet 600 milliGRAM(s) Oral every 8 hours PRN left foot pain  oxyCODONE    5 mG/acetaminophen 325 mG 1 Tablet(s) Oral every 4 hours PRN Moderate Pain (4 - 6)  oxyCODONE    5 mG/acetaminophen 325 mG 2 Tablet(s) Oral every 4 hours PRN Severe Pain (7 - 10)      LABS:                RADIOLOGY & ADDITIONAL STUDIES:
GENERAL SURGERY PROGRESS NOTE        SUBJECTIVE: Pt seen and examined at bedside. Reports controlled pain.  Denies N/V, fever, chills, SOB, CP.     Vital Signs Last 24 Hrs  T(C): 36.6 (10 Apr 2018 05:36), Max: 37.4 (09 Apr 2018 16:51)  T(F): 97.9 (10 Apr 2018 05:36), Max: 99.3 (09 Apr 2018 16:51)  HR: 80 (10 Apr 2018 05:36) (80 - 86)  BP: 121/70 (10 Apr 2018 05:36) (102/68 - 121/77)  BP(mean): --  RR: 16 (10 Apr 2018 05:36) (16 - 17)  SpO2: 95% (10 Apr 2018 05:36) (93% - 96%)    Physical Exam  General: awake, alert  Pulm: respirations unlabored, no increased WOB  Abdomen: Soft, non-tender  Extremities: Dressing in place over LLE, elevated    I&O's Summary    09 Apr 2018 07:01  -  10 Apr 2018 07:00  --------------------------------------------------------  IN: 1850 mL / OUT: 1350 mL / NET: 500 mL      I&O's Detail    09 Apr 2018 07:01  -  10 Apr 2018 07:00  --------------------------------------------------------  IN:    IV PiggyBack: 150 mL    Oral Fluid: 1700 mL  Total IN: 1850 mL    OUT:    Voided: 1350 mL  Total OUT: 1350 mL    Total NET: 500 mL          MEDICATIONS  (STANDING):  acetaminophen  IVPB. 1000 milliGRAM(s) IV Intermittent once  buDESOnide 160 MICROgram(s)/formoterol 4.5 MICROgram(s) Inhaler 2 Puff(s) Inhalation two times a day  cefTRIAXone   IVPB 2 Gram(s) IV Intermittent every 24 hours  clindamycin IVPB 600 milliGRAM(s) IV Intermittent every 8 hours  Dakins Solution - Full Strength 1 Application(s) Topical daily  docusate sodium 100 milliGRAM(s) Oral two times a day  enoxaparin Injectable 40 milliGRAM(s) SubCutaneous daily  hydrochlorothiazide 12.5 milliGRAM(s) Oral daily  montelukast 10 milliGRAM(s) Oral daily  propranolol LA 80 milliGRAM(s) Oral daily  senna 2 Tablet(s) Oral at bedtime    MEDICATIONS  (PRN):  ALBUTerol/ipratropium for Nebulization. 3 milliLiter(s) Nebulizer once PRN Shortness of Breath and/or Wheezing  HYDROmorphone  Injectable 0.5 milliGRAM(s) IV Push once PRN Severe Pain (7 - 10)  morphine  - Injectable 4 milliGRAM(s) IV Push every 4 hours PRN Severe Pain (7 - 10)      LABS:                        13.5   15.3  )-----------( 299      ( 10 Apr 2018 07:19 )             40.3     04-10    137  |  97  |  19  ----------------------------<  103<H>  3.8   |  27  |  0.96    Ca    9.1      10 Apr 2018 07:18  Phos  2.9     04-10  Mg     2.1     04-10            RADIOLOGY & ADDITIONAL STUDIES:
GENERAL SURGERY PROGRESS NOTE      SUBJECTIVE: Pt seen and examined at bedside. TIFFANY o/n.  Denies N/V, fever, chills, SOB, CP.     Vital Signs Last 24 Hrs  T(C): 36.7 (12 Apr 2018 09:09), Max: 36.8 (11 Apr 2018 13:21)  T(F): 98 (12 Apr 2018 09:09), Max: 98.2 (11 Apr 2018 13:21)  HR: 82 (12 Apr 2018 09:09) (68 - 90)  BP: 126/75 (12 Apr 2018 09:09) (117/68 - 133/86)  BP(mean): --  RR: 18 (12 Apr 2018 09:09) (16 - 18)  SpO2: 97% (12 Apr 2018 09:09) (94% - 97%)    Physical Exam  General: awake, alert  Pulm: respirations unlabored, no increased WOB  Abdomen: Soft, non-tender  Extremities: Dressing in place over LLE    I&O's Summary    11 Apr 2018 07:01  -  12 Apr 2018 07:00  --------------------------------------------------------  IN: 2390 mL / OUT: 2150 mL / NET: 240 mL    12 Apr 2018 07:01  -  12 Apr 2018 10:31  --------------------------------------------------------  IN: 240 mL / OUT: 250 mL / NET: -10 mL      I&O's Detail    11 Apr 2018 07:01  -  12 Apr 2018 07:00  --------------------------------------------------------  IN:    IV PiggyBack: 150 mL    Oral Fluid: 2240 mL  Total IN: 2390 mL    OUT:    Voided: 2150 mL  Total OUT: 2150 mL    Total NET: 240 mL      12 Apr 2018 07:01  -  12 Apr 2018 10:31  --------------------------------------------------------  IN:    Oral Fluid: 240 mL  Total IN: 240 mL    OUT:    Voided: 250 mL  Total OUT: 250 mL    Total NET: -10 mL          MEDICATIONS  (STANDING):  buDESOnide 160 MICROgram(s)/formoterol 4.5 MICROgram(s) Inhaler 2 Puff(s) Inhalation two times a day  cefTRIAXone   IVPB 2 Gram(s) IV Intermittent every 24 hours  cholecalciferol 5000 Unit(s) Oral daily  Dakins Solution - Full Strength 1 Application(s) Topical daily  docusate sodium 100 milliGRAM(s) Oral two times a day  enoxaparin Injectable 40 milliGRAM(s) SubCutaneous daily  hydrochlorothiazide 12.5 milliGRAM(s) Oral daily  montelukast 10 milliGRAM(s) Oral daily  omega-3-Acid Ethyl Esters 1 Gram(s) Oral daily  propranolol LA 80 milliGRAM(s) Oral daily  senna 2 Tablet(s) Oral at bedtime  sodium chloride 0.9%. 1000 milliLiter(s) (125 mL/Hr) IV Continuous <Continuous>    MEDICATIONS  (PRN):  ALBUTerol/ipratropium for Nebulization. 3 milliLiter(s) Nebulizer once PRN Shortness of Breath and/or Wheezing  HYDROmorphone  Injectable 0.25 milliGRAM(s) IV Push once PRN pre-med prior to dressing change  ibuprofen  Tablet 600 milliGRAM(s) Oral every 8 hours PRN left foot pain      LABS:                        13.9   11.2  )-----------( 381      ( 12 Apr 2018 07:12 )             41.1     04-12    138  |  101  |  19  ----------------------------<  107<H>  4.4   |  25  |  1.01    Ca    9.6      12 Apr 2018 07:09  Phos  4.3     04-12  Mg     2.0     04-12            RADIOLOGY & ADDITIONAL STUDIES:
Plastic Surgery Progress Note    S: Patient seen and examined.  doing well, per pt, PT had him ambulate with walker - no crutches.    Vital Signs Last 24 Hrs  T(C): 36.7 (15 Apr 2018 05:50), Max: 37.3 (14 Apr 2018 17:17)  T(F): 98.1 (15 Apr 2018 05:50), Max: 99.2 (14 Apr 2018 17:17)  HR: 74 (15 Apr 2018 05:50) (65 - 77)  BP: 148/86 (15 Apr 2018 05:50) (125/74 - 148/86)  BP(mean): --  RR: 18 (15 Apr 2018 05:50) (18 - 19)  SpO2: 95% (15 Apr 2018 05:50) (92% - 95%)  I&O's Detail    14 Apr 2018 07:01  -  15 Apr 2018 07:00  --------------------------------------------------------  IN:    IV PiggyBack: 50 mL    Oral Fluid: 960 mL  Total IN: 1010 mL    OUT:    VAC (Vacuum Assisted Closure) System: 50 mL    Voided: 2825 mL  Total OUT: 2875 mL    Total NET: -1865 mL          Physical Exam:  General: Awake and alert, NAD  LLE: vac in place holding suction
Plastic Surgery Progress Note    S: Patient seen and examined. doing well.     Vital Signs Last 24 Hrs  T(C): 36.8 (13 Apr 2018 05:29), Max: 36.8 (12 Apr 2018 17:11)  T(F): 98.3 (13 Apr 2018 05:29), Max: 98.3 (12 Apr 2018 17:11)  HR: 73 (13 Apr 2018 05:29) (73 - 90)  BP: 122/81 (13 Apr 2018 05:29) (121/74 - 128/74)  BP(mean): --  RR: 18 (13 Apr 2018 05:29) (18 - 18)  SpO2: 95% (13 Apr 2018 05:29) (95% - 97%)  I&O's Detail    11 Apr 2018 07:01  -  12 Apr 2018 07:00  --------------------------------------------------------  IN:    IV PiggyBack: 150 mL    Oral Fluid: 2240 mL  Total IN: 2390 mL    OUT:    Voided: 2150 mL  Total OUT: 2150 mL    Total NET: 240 mL      12 Apr 2018 07:01  -  13 Apr 2018 06:40  --------------------------------------------------------  IN:    IV PiggyBack: 50 mL    Oral Fluid: 1260 mL    sodium chloride 0.9%.: 1000 mL  Total IN: 2310 mL    OUT:    Voided: 1750 mL  Total OUT: 1750 mL    Total NET: 560 mL          Physical Exam:  General: Awake and alert, NAD  LLE: dressing CDI.
Pt seen and examined.     T(C): 36.6 (04-11-18 @ 05:27), Max: 36.7 (04-11-18 @ 01:07)  T(F): 97.8 (04-11-18 @ 05:27), Max: 98.1 (04-11-18 @ 01:07)  HR: 79 (04-11-18 @ 05:27) (72 - 91)  BP: 125/86 (04-11-18 @ 05:27) (122/74 - 137/-)  RR: 16 (04-11-18 @ 05:27) (14 - 18)  SpO2: 97% (04-11-18 @ 05:27) (93% - 98%)      10 Apr 2018 07:01  -  11 Apr 2018 07:00  --------------------------------------------------------  IN:    IV PiggyBack: 150 mL    Oral Fluid: 720 mL  Total IN: 870 mL    OUT:    Voided: 1100 mL  Total OUT: 1100 mL    Total NET: -230 mL          Exam:  LLE wound clean with exposed extensor tendons  No pus. No crepitus. Good ROM.                        13.9   12.6  )-----------( 344      ( 11 Apr 2018 06:50 )             40.9     04-10    137  |  97  |  19  ----------------------------<  103<H>  3.8   |  27  |  0.96    Ca    9.1      10 Apr 2018 07:18  Phos  2.9     04-10  Mg     2.1     04-10        Plan:
Pt seen and examined. Doing well. No acute events o/n.     T(C): 37.1 (04-14-18 @ 05:10), Max: 37.1 (04-14-18 @ 05:10)  T(F): 98.8 (04-14-18 @ 05:10), Max: 98.8 (04-14-18 @ 05:10)  HR: 71 (04-14-18 @ 05:10) (66 - 80)  BP: 131/79 (04-14-18 @ 05:10) (115/71 - 155/70)  RR: 18 (04-14-18 @ 05:10) (14 - 20)  SpO2: 96% (04-14-18 @ 05:10) (92% - 96%)      13 Apr 2018 07:01  -  14 Apr 2018 07:00  --------------------------------------------------------  IN:    IV PiggyBack: 50 mL    Oral Fluid: 120 mL  Total IN: 170 mL    OUT:    Voided: 1925 mL  Total OUT: 1925 mL    Total NET: -1755 mL          Exam:  VAC in place, holding suction.
Pt seen and examined. Doing well. No acute events o/n.     T(C): 37.2 (04-16-18 @ 05:04), Max: 37.2 (04-16-18 @ 05:04)  T(F): 98.9 (04-16-18 @ 05:04), Max: 98.9 (04-16-18 @ 05:04)  HR: 80 (04-16-18 @ 05:04) (66 - 80)  BP: 133/83 (04-16-18 @ 05:04) (111/68 - 133/83)  RR: 16 (04-16-18 @ 05:04) (16 - 18)  SpO2: 98% (04-16-18 @ 05:04) (94% - 98%)      14 Apr 2018 07:01  -  15 Apr 2018 07:00  --------------------------------------------------------  IN:    IV PiggyBack: 50 mL    Oral Fluid: 960 mL  Total IN: 1010 mL    OUT:    VAC (Vacuum Assisted Closure) System: 50 mL    Voided: 2825 mL  Total OUT: 2875 mL    Total NET: -1865 mL      15 Apr 2018 07:01  -  16 Apr 2018 06:37  --------------------------------------------------------  IN:    IV PiggyBack: 50 mL    Oral Fluid: 1500 mL  Total IN: 1550 mL    OUT:    Voided: 2200 mL  Total OUT: 2200 mL    Total NET: -650 mL          Exam:  VAC in place, holding suction.
SUBJECTIVE:  Doing well.   No overnight events. Pt. deferred dressing change at this time.     OBJECTIVE:     ** VITAL SIGNS / I&O's **    T(C): 36.7 (04-12-18 @ 09:09), Max: 36.8 (04-11-18 @ 13:21)  T(F): 98 (04-12-18 @ 09:09), Max: 98.2 (04-11-18 @ 13:21)  HR: 82 (04-12-18 @ 09:09) (68 - 90)  BP: 126/75 (04-12-18 @ 09:09) (117/68 - 133/86)  RR: 18 (04-12-18 @ 09:09) (16 - 18)  SpO2: 97% (04-12-18 @ 09:09) (94% - 97%)      11 Apr 2018 07:01  -  12 Apr 2018 07:00  --------------------------------------------------------  IN:    IV PiggyBack: 150 mL    Oral Fluid: 2240 mL  Total IN: 2390 mL    OUT:    Voided: 2150 mL  Total OUT: 2150 mL    Total NET: 240 mL      12 Apr 2018 07:01  -  12 Apr 2018 09:40  --------------------------------------------------------  IN:    Oral Fluid: 240 mL  Total IN: 240 mL    OUT:    Voided: 250 mL  Total OUT: 250 mL    Total NET: -10 mL          ** PHYSICAL EXAM **    -- CONSTITUTIONAL: AOx3. NAD.   -- CARDIOVASCULAR: Regular rate and rhythm. S1, S2.  -- RESPIRATORY: Bilateral breath sounds.   -- EXTREMITIES: clean and dry intact dressing; deferred change to primary team      ** LABS **                 13.9   11.2   )----------(  381       ( 12 Apr 2018 07:12 )               41.1      138    |  101    |  19     ----------------------------<  107        ( 12 Apr 2018 07:09 )  4.4     |  25     |  1.01     Ca    9.6        ( 12 Apr 2018 07:09 )  Phos  4.3       ( 12 Apr 2018 07:09 )  Mg     2.0       ( 12 Apr 2018 07:09 )          CAPILLARY BLOOD GLUCOSE
TRAUMA SURGERY [facility] MEDICINE PROGRESS NOTE    Patient is a 67y old  Male who presents with a chief complaint of LLE cellulitis (06 Apr 2018 08:16)    SUBJECTIVE / OVERNIGHT EVENTS:  Overnight, no acute events.  Pt reports pain is controlled- but states prn dilaudid 0.5 mg iv prior to dressing changes are making him feel drowsy.  Denies abd pain, cp, sob, f/chills, n/v.    CAPILLARY BLOOD GLUCOSE    I&O's Summary    10 Apr 2018 07:01  -  11 Apr 2018 07:00  --------------------------------------------------------  IN: 870 mL / OUT: 1100 mL / NET: -230 mL    11 Apr 2018 07:01  -  11 Apr 2018 16:19  --------------------------------------------------------  IN: 770 mL / OUT: 300 mL / NET: 470 mL    Vital Signs Last 24 Hrs  T(C): 36.8 (11 Apr 2018 13:21), Max: 36.9 (11 Apr 2018 09:14)  T(F): 98.2 (11 Apr 2018 13:21), Max: 98.4 (11 Apr 2018 09:14)  HR: 68 (11 Apr 2018 13:21) (68 - 91)  BP: 119/76 (11 Apr 2018 13:21) (119/76 - 139/83)  BP(mean): --  RR: 18 (11 Apr 2018 13:21) (14 - 18)  SpO2: 95% (11 Apr 2018 13:21) (93% - 97%)    PHYSICAL EXAM:  GENERAL:  Well appearing, morbidly obese M, sitting up in NAD  HEAD:  NCAT  EYES: PERRLA, conjunctiva clear  NECK: Supple  CHEST/LUNG: CTA B/L. No w/r/r.  HEART: Reg rate. Normal S1, S2. No m/r/g.   ABDOMEN: Obese, SNTND. Bowel sounds present  EXTREMITIES:  2+ Peripheral Pulses, No clubbing, cyanosis. LLE dressed in ace wrap w/ edema. Left toes sensation intact, able to move toes spontaneously. Cap refill wnl.   PSYCH: appropriate affect    LABS:                        13.9   12.6  )-----------( 344      ( 11 Apr 2018 06:50 )             40.9     04-11    140  |  98  |  19  ----------------------------<  102<H>  4.1   |  28  |  0.91    Ca    9.9      11 Apr 2018 06:50  Phos  3.8     04-11  Mg     2.0     04-11    MEDICATIONS  (STANDING):  acetaminophen  IVPB. 1000 milliGRAM(s) IV Intermittent once  buDESOnide 160 MICROgram(s)/formoterol 4.5 MICROgram(s) Inhaler 2 Puff(s) Inhalation two times a day  cefTRIAXone   IVPB 2 Gram(s) IV Intermittent every 24 hours  cholecalciferol 5000 Unit(s) Oral daily  clindamycin IVPB 600 milliGRAM(s) IV Intermittent every 8 hours  Dakins Solution - Full Strength 1 Application(s) Topical daily  docusate sodium 100 milliGRAM(s) Oral two times a day  enoxaparin Injectable 40 milliGRAM(s) SubCutaneous daily  hydrochlorothiazide 12.5 milliGRAM(s) Oral daily  montelukast 10 milliGRAM(s) Oral daily  omega-3-Acid Ethyl Esters 1 Gram(s) Oral daily  propranolol LA 80 milliGRAM(s) Oral daily  senna 2 Tablet(s) Oral at bedtime    MEDICATIONS  (PRN):  ALBUTerol/ipratropium for Nebulization. 3 milliLiter(s) Nebulizer once PRN Shortness of Breath and/or Wheezing  HYDROmorphone  Injectable 0.25 milliGRAM(s) IV Push once PRN pre-med prior to dressing change
TRAUMA [facility] MEDICINE PROGRESS NOTE    Patient is a 67y old  Male who presents with a chief complaint of LLE cellulitis (06 Apr 2018 08:16)    SUBJECTIVE / OVERNIGHT EVENTS:  Overnight, no acute events.  Pt seen at bedside denies any complaints.  Reports morphine gave him vivid nightmares last night, was given dilaudid instead prior to dressing change and pt reports pain better improved.  Denies cp, sob, abd pain, n/v, f/chills.    CAPILLARY BLOOD GLUCOSE    I&O's Summary    09 Apr 2018 07:01  -  10 Apr 2018 07:00  --------------------------------------------------------  IN: 1850 mL / OUT: 1350 mL / NET: 500 mL    Vital Signs Last 24 Hrs  T(C): 36.5 (10 Apr 2018 13:32), Max: 37.4 (09 Apr 2018 16:51)  T(F): 97.7 (10 Apr 2018 13:32), Max: 99.3 (09 Apr 2018 16:51)  HR: 75 (10 Apr 2018 13:32) (75 - 86)  BP: 137/- (10 Apr 2018 13:32) (102/68 - 137/-)  BP(mean): --  RR: 18 (10 Apr 2018 13:32) (16 - 18)  SpO2: 95% (10 Apr 2018 13:32) (93% - 98%)    PHYSICAL EXAM:  GENERAL:  Well appearing, morbidly obese M, sitting up in NAD  HEAD:  NCAT  EYES: PERRLA, conjunctiva clear  NECK: Supple  CHEST/LUNG: CTA B/L. No w/r/r.  HEART: Reg rate. Normal S1, S2. No m/r/g.   ABDOMEN: Obese, SNTND. Bowel sounds present  EXTREMITIES:  2+ Peripheral Pulses, No clubbing, cyanosis. LLE dressed in kerlix wrap w/ edema. Left toes sensation intact, able to move toes spontaneously. Cap refill wnl.   PSYCH: appropriate affect    LABS:                        13.5   15.3  )-----------( 299      ( 10 Apr 2018 07:19 )             40.3     04-10    137  |  97  |  19  ----------------------------<  103<H>  3.8   |  27  |  0.96    Ca    9.1      10 Apr 2018 07:18  Phos  2.9     04-10  Mg     2.1     04-10    MEDICATIONS  (STANDING):  acetaminophen  IVPB. 1000 milliGRAM(s) IV Intermittent once  buDESOnide 160 MICROgram(s)/formoterol 4.5 MICROgram(s) Inhaler 2 Puff(s) Inhalation two times a day  cefTRIAXone   IVPB 2 Gram(s) IV Intermittent every 24 hours  clindamycin IVPB 600 milliGRAM(s) IV Intermittent every 8 hours  Dakins Solution - Full Strength 1 Application(s) Topical daily  docusate sodium 100 milliGRAM(s) Oral two times a day  enoxaparin Injectable 40 milliGRAM(s) SubCutaneous daily  hydrochlorothiazide 12.5 milliGRAM(s) Oral daily  montelukast 10 milliGRAM(s) Oral daily  propranolol LA 80 milliGRAM(s) Oral daily  senna 2 Tablet(s) Oral at bedtime    MEDICATIONS  (PRN):  ALBUTerol/ipratropium for Nebulization. 3 milliLiter(s) Nebulizer once PRN Shortness of Breath and/or Wheezing  HYDROmorphone  Injectable 0.5 milliGRAM(s) IV Push once PRN Severe Pain (7 - 10)
Patient is a 67y old  Male who presents with a chief complaint of LLE cellulitis (16 Apr 2018 14:13)      SUBJECTIVE / OVERNIGHT EVENTS: no aeon, pt feels well no f/c/n/v     MEDICATIONS  (STANDING):  buDESOnide 160 MICROgram(s)/formoterol 4.5 MICROgram(s) Inhaler 2 Puff(s) Inhalation two times a day  cefTRIAXone   IVPB 2 Gram(s) IV Intermittent every 24 hours  cholecalciferol 5000 Unit(s) Oral daily  docusate sodium 100 milliGRAM(s) Oral two times a day  enoxaparin Injectable 40 milliGRAM(s) SubCutaneous daily  hydrochlorothiazide 12.5 milliGRAM(s) Oral daily  loratadine 10 milliGRAM(s) Oral daily  montelukast 10 milliGRAM(s) Oral daily  omega-3-Acid Ethyl Esters 1 Gram(s) Oral daily  propranolol LA 80 milliGRAM(s) Oral daily  senna 2 Tablet(s) Oral at bedtime    MEDICATIONS  (PRN):  ALBUTerol/ipratropium for Nebulization. 3 milliLiter(s) Nebulizer once PRN Shortness of Breath and/or Wheezing  ibuprofen  Tablet 600 milliGRAM(s) Oral every 8 hours PRN left foot pain  oxyCODONE    5 mG/acetaminophen 325 mG 1 Tablet(s) Oral every 4 hours PRN Moderate Pain (4 - 6)  oxyCODONE    5 mG/acetaminophen 325 mG 2 Tablet(s) Oral every 4 hours PRN Severe Pain (7 - 10)      Vital Signs Last 24 Hrs  T(C): 37 (16 Apr 2018 13:56), Max: 37.2 (16 Apr 2018 05:04)  T(F): 98.6 (16 Apr 2018 13:56), Max: 98.9 (16 Apr 2018 05:04)  HR: 80 (16 Apr 2018 13:56) (74 - 80)  BP: 110/73 (16 Apr 2018 13:56) (110/73 - 133/83)  BP(mean): --  RR: 18 (16 Apr 2018 13:56) (16 - 18)  SpO2: 94% (16 Apr 2018 13:56) (94% - 98%)  CAPILLARY BLOOD GLUCOSE        I&O's Summary    15 Apr 2018 07:01  -  16 Apr 2018 07:00  --------------------------------------------------------  IN: 1550 mL / OUT: 2200 mL / NET: -650 mL    16 Apr 2018 07:01  -  16 Apr 2018 14:34  --------------------------------------------------------  IN: 600 mL / OUT: 900 mL / NET: -300 mL        PHYSICAL EXAM:  CONSTITUTIONAL: NAD, morbidly obese   HEAD:  Atraumatic, Normocephalic  EYES: EOMI, PERRLA, conjunctiva and sclera clear  ENMT: Supple, No JVD  RESPIRATORY: Clear to auscultation bilaterally; No wheeze  CARDIOVASCULAR: s1 s2 Regular rate and rhythm; No M/R/G  GI: Soft, Nontender, Nondistended; Bowel sounds present  EXTREMITIES:  2+ Peripheral Pulses, No clubbing, cyanosis, or edema. LLE in cast, wound vac cdi   PSYCH: AAOx3  NEUROLOGY: non-focal  SKIN: No rashes or lesions    LABS:                  RADIOLOGY & ADDITIONAL TESTS:  Imaging Personally Reviewed:   Consultant(s) Notes Reviewed:    Care Discussed with Consultants/Other Providers: surgery
TRAUMA SURGERY [facility] MEDICINE PROGRESS NOTE    Patient is a 67y old  Male who presents with a chief complaint of LLE cellulitis (06 Apr 2018 08:16)    SUBJECTIVE / OVERNIGHT EVENTS:  Overnight, no acute events.  Pt seen in PACU s/p procedure. Denies any complaints.  Denies cp, sob, abd pain, n/v, f/chills. Pain is controlled.    CAPILLARY BLOOD GLUCOSE    I&O's Summary    12 Apr 2018 07:01  -  13 Apr 2018 07:00  --------------------------------------------------------  IN: 2310 mL / OUT: 1750 mL / NET: 560 mL    13 Apr 2018 07:01  -  13 Apr 2018 14:33  --------------------------------------------------------  IN: 0 mL / OUT: 425 mL / NET: -425 mL    Vital Signs Last 24 Hrs  T(C): 36.4 (13 Apr 2018 13:28), Max: 36.8 (12 Apr 2018 17:11)  T(F): 97.5 (13 Apr 2018 13:28), Max: 98.3 (12 Apr 2018 17:11)  HR: 71 (13 Apr 2018 13:28) (66 - 90)  BP: 131/79 (13 Apr 2018 13:28) (121/74 - 155/70)  BP(mean): 92 (13 Apr 2018 13:00) (92 - 95)  RR: 18 (13 Apr 2018 13:28) (14 - 20)  SpO2: 95% (13 Apr 2018 13:28) (92% - 96%)    PHYSICAL EXAM:  GENERAL:  Well appearing, morbidly obese M, sitting up in NAD  HEAD:  NCAT  EYES: PERRLA, conjunctiva clear  NECK: Supple  CHEST/LUNG: CTA B/L. No w/r/r.  HEART: Reg rate. Normal S1, S2. No m/r/g.   ABDOMEN: Obese, SNTND. Bowel sounds present  EXTREMITIES:  2+ Peripheral Pulses, No clubbing, cyanosis. LLE in cast.  PSYCH: appropriate affect    LABS:                        13.9   11.2  )-----------( 381      ( 12 Apr 2018 07:12 )             41.1     04-12    138  |  101  |  19  ----------------------------<  107<H>  4.4   |  25  |  1.01    Ca    9.6      12 Apr 2018 07:09  Phos  4.3     04-12  Mg     2.0     04-12      PT/INR - ( 12 Apr 2018 11:10 )   PT: 11.9 sec;   INR: 1.09 ratio         PTT - ( 12 Apr 2018 11:10 )  PTT:26.2 sec    MEDICATIONS  (STANDING):  buDESOnide 160 MICROgram(s)/formoterol 4.5 MICROgram(s) Inhaler 2 Puff(s) Inhalation two times a day  cefTRIAXone   IVPB 2 Gram(s) IV Intermittent every 24 hours  cholecalciferol 5000 Unit(s) Oral daily  docusate sodium 100 milliGRAM(s) Oral two times a day  enoxaparin Injectable 40 milliGRAM(s) SubCutaneous daily  hydrochlorothiazide 12.5 milliGRAM(s) Oral daily  montelukast 10 milliGRAM(s) Oral daily  omega-3-Acid Ethyl Esters 1 Gram(s) Oral daily  propranolol LA 80 milliGRAM(s) Oral daily  senna 2 Tablet(s) Oral at bedtime    MEDICATIONS  (PRN):  ALBUTerol/ipratropium for Nebulization. 3 milliLiter(s) Nebulizer once PRN Shortness of Breath and/or Wheezing  ibuprofen  Tablet 600 milliGRAM(s) Oral every 8 hours PRN left foot pain
TRAUMA SURGERY [facility] MEDICINE PROGRESS NOTE    Patient is a 67y old  Male who presents with a chief complaint of LLE cellulitis (06 Apr 2018 08:16)    SUBJECTIVE / OVERNIGHT EVENTS:  Overnight, no acute events.  Pt seen at bedside denies any complaints. Ambulating. Denies cp, sob, abd pain, n/v, f/chills.    CAPILLARY BLOOD GLUCOSE    I&O's Summary    11 Apr 2018 07:01  -  12 Apr 2018 07:00  --------------------------------------------------------  IN: 2390 mL / OUT: 2150 mL / NET: 240 mL    12 Apr 2018 07:01  -  12 Apr 2018 12:44  --------------------------------------------------------  IN: 240 mL / OUT: 250 mL / NET: -10 mL    Vital Signs Last 24 Hrs  T(C): 36.7 (12 Apr 2018 09:09), Max: 36.8 (11 Apr 2018 13:21)  T(F): 98 (12 Apr 2018 09:09), Max: 98.2 (11 Apr 2018 13:21)  HR: 82 (12 Apr 2018 09:09) (68 - 90)  BP: 126/75 (12 Apr 2018 09:09) (117/68 - 133/86)  BP(mean): --  RR: 18 (12 Apr 2018 09:09) (16 - 18)  SpO2: 97% (12 Apr 2018 09:09) (94% - 97%)    PHYSICAL EXAM:  GENERAL:  Well appearing, morbidly obese M, sitting up in NAD  HEAD:  NCAT  EYES: PERRLA, conjunctiva clear  NECK: Supple  CHEST/LUNG: CTA B/L. No w/r/r.  HEART: Reg rate. Normal S1, S2. No m/r/g.   ABDOMEN: Obese, SNTND. Bowel sounds present  EXTREMITIES:  2+ Peripheral Pulses, No clubbing, cyanosis. LLE dressed in ace wrap w/ edema. Left toes sensation intact, able to move toes spontaneously.   PSYCH: appropriate affect    LABS:                        13.9   11.2  )-----------( 381      ( 12 Apr 2018 07:12 )             41.1     04-12    138  |  101  |  19  ----------------------------<  107<H>  4.4   |  25  |  1.01    Ca    9.6      12 Apr 2018 07:09  Phos  4.3     04-12  Mg     2.0     04-12      PT/INR - ( 12 Apr 2018 11:10 )   PT: 11.9 sec;   INR: 1.09 ratio         PTT - ( 12 Apr 2018 11:10 )  PTT:26.2 sec    MEDICATIONS  (STANDING):  buDESOnide 160 MICROgram(s)/formoterol 4.5 MICROgram(s) Inhaler 2 Puff(s) Inhalation two times a day  cefTRIAXone   IVPB 2 Gram(s) IV Intermittent every 24 hours  cholecalciferol 5000 Unit(s) Oral daily  Dakins Solution - Full Strength 1 Application(s) Topical daily  docusate sodium 100 milliGRAM(s) Oral three times a day  enoxaparin Injectable 40 milliGRAM(s) SubCutaneous daily  hydrochlorothiazide 12.5 milliGRAM(s) Oral daily  montelukast 10 milliGRAM(s) Oral daily  omega-3-Acid Ethyl Esters 1 Gram(s) Oral daily  oxyCODONE    5 mG/acetaminophen 325 mG 1 Tablet(s) Oral once  propranolol LA 80 milliGRAM(s) Oral daily  senna 2 Tablet(s) Oral at bedtime  sodium chloride 0.9%. 1000 milliLiter(s) (125 mL/Hr) IV Continuous <Continuous>    MEDICATIONS  (PRN):  ALBUTerol/ipratropium for Nebulization. 3 milliLiter(s) Nebulizer once PRN Shortness of Breath and/or Wheezing  HYDROmorphone  Injectable 0.25 milliGRAM(s) IV Push once PRN pre-med prior to dressing change  ibuprofen  Tablet 600 milliGRAM(s) Oral every 8 hours PRN left foot pain

## 2018-04-16 NOTE — PROGRESS NOTE ADULT - PROBLEM SELECTOR PLAN 5
not in exacerbation  - outpatient sleep study for likely CHRISTINE  - at home on advair. c/w formulary symbicort here
not in exacerbation  - outpatient sleep study for likely CHRISTINE  - at home on advair. c/w formulary symbicort here  -

## 2018-04-16 NOTE — PROGRESS NOTE ADULT - PROBLEM SELECTOR PLAN 6
DVT PPX: lovenox  PT/OT - home PT, reconsult now s/p integra placement
DVT PPX: lovenox  PT/OT - home PT
DVT PPX: lovenox  PT/OT - home PT, reconsult now s/p integra placement
DVT PPX: lovenox  PT/OT - home PT
DVT PPX: lovenox  PT/OT - home PT
DVT PPX: lovenox  PT/OT eval

## 2018-04-16 NOTE — PROGRESS NOTE ADULT - PROBLEM SELECTOR PLAN 3
controlled  - c/w home propranolol and hctz

## 2018-04-16 NOTE — PROGRESS NOTE ADULT - PROBLEM SELECTOR PLAN 2
now resolving. p/w severe sepsis 2/2 LLE necrotizing soft tissue infection  - abx as above

## 2018-04-16 NOTE — PROGRESS NOTE ADULT - PROBLEM SELECTOR PLAN 1
s/p debridement 4/6 w/ wound cx growing Grp a strep  - per trauma surgery team  - monitor and keep LLE elevated  - monitor wbc, temp  - f/u ID - c/w ceftriaxone 2 g iv qd, sp clinda completed 4/11  - dilaudid to 0.25 mg iv once prn dressing change, percocet 1 tab prn dressing change  - c/w bid wound care. Per plastics, after integra, will need NWB L leg w/ crutches  - monitor bms - c/w senna/colace  - plastics to place integra reconstruction, VACs on Friday 4/13. NPO MN tonight.  - ibuprofen 600 mg po q8h prn w/ food only
s/p debridement 4/6 w/ wound cx growing Grp a strep  s/p integra reconstruction and VAC 4/13  - per trauma surgery team  - monitor and keep LLE elevated  - monitor wbc, temp  - f/u ID - c/w ceftriaxone 2 g iv qd, sp clinda completed 4/11  - Per plastics - will need NWB L leg w/ crutches  - monitor bms - c/w senna/colace  - ibuprofen 600 mg po q8h prn w/ food only
s/p debridement 4/6 w/ wound cx growing Grp a strep  - per trauma surgery team  - monitor and keep LLE elevated  - monitor wbc, temp  - f/u ID - c/w ceftriaxone 2 g iv qd, c/w clindamycin to end 4/11  - dilaudid 0.5 mg iv once prn dressing change, morphine 4 mg iv q4h prn severe pain  - monitor bms - c/w senna/colace  - eventual plastics consult for skin graft
s/p debridement 4/6 w/ wound cx growing Grp a strep  - per trauma surgery team  - monitor and keep LLE elevated  - monitor wbc, temp  - f/u ID - c/w ceftriaxone 2 g iv qd, c/w clindamycin to end 4/11  - decr dilaudid to 0.25 mg iv once prn dressing change  - monitor bms - c/w senna/colace  - plastics to place integra reconstruction, VACs on Friday 4/13
s/p debridement 4/6 w/ wound cx growing Grp a strep  - per trauma surgery team  - monitor and keep LLE elevated  - monitor wbc, temp  - f/u ID - c/w ceftriaxone 2 g iv qd, c/w clindamycin to end 4/11  - dilaudid 0.5 mg iv once prn dressing change, D/C morphine  - monitor bms - c/w senna/colace  - plastics to place integra/sin reconstruction on Friday 4/13
s/p debridement 4/6 w/ wound cx growing Grp a strep  s/p integra reconstruction and VAC 4/13  - per trauma surgery team  - monitor and keep LLE elevated  - monitor wbc, temp  - f/u ID - c/w ceftriaxone 2 g iv qd, sp clinda completed 4/11  - Per plastics - will need NWB L leg w/ crutches  - monitor bms - c/w senna/colace  - ibuprofen 600 mg po q8h prn w/ food only

## 2018-04-16 NOTE — PROGRESS NOTE ADULT - PROBLEM SELECTOR PROBLEM 3
Hypertension, unspecified type

## 2018-04-16 NOTE — PROGRESS NOTE ADULT - PROBLEM SELECTOR PROBLEM 5
COPD with asthma

## 2018-04-16 NOTE — PROGRESS NOTE ADULT - ATTENDING COMMENTS
POD#10 s/p debridement of LLE group A strep necrotizing soft tissue infection  POD#3 s/p Integra graft and VAC to wound by plastic surgery  -D/C to [facility] on Keflex until 4/23  -outpatient plastic surgery f/u
Pt seen and examined.  Chart reviewed.  Resident note confirmed.    Pt is a 67 year old male with a medical history significant for COPD, PAD, HTN here with left lower extremity pain. S/p debridement of the left lower extremity for a group A strep necrotizing soft tissue infection.  No acute events overnight.  Continue wound care.  Plastic surgery consult.  PT evaluation.  Discharge planning
Pt seen and examined.  Chart reviewed.  Resident note confirmed.    Pt is a 67 year old male with a medical history significant for COPD, PAD, HTN here with left lower extremity pain. S/p debridement of the left lower extremity for a group A strep necrotizing soft tissue infection.  No acute events overnight.  Continue wound care.  Pt seen by Plastic surgery yesterday.  Plan is for placement of integra on Friday.  Continue wound care.  PT evaluation.  Discharge planning.
Pt seen and examined on 4/7 at 5pm, agree with above. Pt feeling well. Dressing changed at bedside: wound over anterior foot from base of toes to anteromedial calf near knee. Tendons of foot and flexor retinaculum exposed. No necrotic tissue, cellulitis darkening and receding from drawn marks on calf. No foul odor or purulent drainage. Will eventually require skin grafting once granulating.
Pt seen and examined.  Chart reviewed.  Resident note confirmed.    Pt is a 67 year old male with a medical history significant for COPD, PAD, HTN here with left lower extremity pain. S/p debridement of the left lower extremity for a group A strep necrotizing soft tissue infection.  No acute events overnight.  Continue wound care.  Pt is s/p placement of integra.  Continue wound care per plastics.  PT evaluation.  Discharge planning.
Pt seen and examined.  Chart reviewed.  Resident note confirmed.    Pt is a 67 year old male with a medical history significant for COPD, PAD, HTN here with left lower extremity pain. S/p debridement of the left lower extremity for a group A strep necrotizing soft tissue infection.  No acute events overnight.  Continue wound care.  Pt seen by Plastic surgery yesterday.  Plan is for placement of integra on Friday.  Continue wound care.  PT evaluation.  Discharge planning.
Niclole Hull M.D. ,   Pager [phone]     after 5PM/ weekends [phone]
Pt seen and examined.  Chart reviewed.  Resident note confirmed.    Pt is a 67 year old male with a medical history significant for COPD, PAD, HTN here with left lower extremity pain. S/p debridement of the left lower extremity for a group A strep necrotizing soft tissue infection.  No acute events overnight.  Continue wound care.  Pt is s/p placement of integra.  Continue wound care per plastics.  PT evaluation.  Discharge planning.
Pt seen and examined.  Chart reviewed.  Resident note confirmed.    Pt is a 67 year old male with a medical history significant for COPD, PAD, HTN here with left lower extremity pain. S/p debridement of the left lower extremity for a group A strep necrotizing soft tissue infection.  No acute events overnight.  Continue wound care.  Pt seen by Plastic surgery yesterday.  Plan is for placement of integra on Friday.  Continue wound care.  PT evaluation.  Discharge planning.
Pt seen and examined.  Chart reviewed.  Resident note confirmed.    Pt is a 67 year old male with a medical history significant for COPD, PAD, HTN here with left lower extremity pain. S/p debridement of the left lower extremity for a group A strep necrotizing soft tissue infection.  No acute events overnight.  Continue wound care.  Pt seen by Plastic surgery yesterday.  Plan is for placement of integra on Friday.  Continue wound care.  PT evaluation.  Discharge planning.

## 2018-04-16 NOTE — PROGRESS NOTE ADULT - PROBLEM SELECTOR PLAN 4
pre-renal JUNAID, now resolved  - monitor
pre-renal JUNAID, now resolved.  - monitor
pre-renal JUNAID, now resolved  - monitor
pre-renal JUNAID, now resolved.  - monitor

## 2018-04-16 NOTE — PROGRESS NOTE ADULT - PROBLEM SELECTOR PROBLEM 4
JUNAID (acute kidney injury)

## 2018-04-16 NOTE — PROGRESS NOTE ADULT - PROBLEM SELECTOR PROBLEM 1
Necrotizing fasciitis, lower leg

## 2018-04-16 NOTE — DISCHARGE NOTE ADULT - HOSPITAL COURSE
67M with PMH COPD, PAD, HTN presenting with LLE pain (8/10), discoloration. Pt was in the [facility] and felt a bug bite on LLE on Sunday night. Pain began the next morning, and he started to have erythema in the proximal left thigh and left anterior thigh. Erythema and pain improved in the proximal LE, but worsened in distally to include the L foot. On Wednesday, pt was flying back to the , and noticed bullae formation on the L. foot, which he punctured w/ a toothpick, inducing yellow pus. Associated symptoms include: loss of appetite, cough w/ green sputum (since Sun night), feel hot/cold (Mon only), Of note, pt was using naproxen for pain and had contact w/ stray dogs in the [facility]. He was taken to the OR for surgical debridement of the left lower extremity and admitted to the floor for wound management. He remained hemodynamically stable for the entire admission. He was seen by plastic surgery and taken to the OR for Integra graft and vac placement on 4/13/18. He was seen by PT who recommended rehab, and patient was discharged with vac in place on 4/16/18 in stable condition with follow up with Dr. Moody.

## 2018-04-16 NOTE — DISCHARGE NOTE ADULT - MEDICATION SUMMARY - MEDICATIONS TO TAKE
I will START or STAY ON the medications listed below when I get home from the hospital:    oxyCODONE-acetaminophen 5 mg-325 mg oral tablet  -- 1 tab(s) by mouth every 4 hours, As needed, Moderate Pain (4 - 6) MDD:6 tabs  -- Indication: For Pain    ibuprofen 600 mg oral tablet  -- 1 tab(s) by mouth every 8 hours, As needed, left foot pain  -- Indication: For Pain    propranolol 80 mg oral capsule, extended release  -- 1 cap(s) by mouth once a day  -- Indication: For HTN    Advair Diskus 250 mcg-50 mcg inhalation powder  -- 1 puff(s) inhaled 2 times a day  -- Indication: For Home med    Keflex 500 mg oral capsule  -- 1 cap(s) by mouth 2 times a day, start on 4/16, end on 4/23  -- Finish all this medication unless otherwise directed by prescriber.    -- Indication: For Infection    hydroCHLOROthiazide 12.5 mg oral tablet  -- 1 tab(s) by mouth once a day  -- Indication: For HTN    Singulair 10 mg oral tablet  -- 1 tab(s) by mouth once a day  -- Indication: For Home med

## 2018-04-16 NOTE — DISCHARGE NOTE ADULT - PATIENT PORTAL LINK FT
You can access the [facility] Patient Portal, offered by [facility], by registering with the following website: [email or web address]

## 2018-04-16 NOTE — PROGRESS NOTE ADULT - PROVIDER SPECIALTY LIST ADULT
Hospitalist
Hospitalist
Infectious Disease
Plastic Surgery
Trauma Surgery
Hospitalist

## 2018-04-17 ENCOUNTER — APPOINTMENT (OUTPATIENT)
Dept: PULMONOLOGY | Facility: CLINIC | Age: 68
End: 2018-04-17

## 2018-05-15 ENCOUNTER — TRANSCRIPTION ENCOUNTER (OUTPATIENT)
Age: 68
End: 2018-05-15

## 2018-05-15 RX ORDER — SODIUM CHLORIDE 9 MG/ML
3 INJECTION INTRAMUSCULAR; INTRAVENOUS; SUBCUTANEOUS EVERY 8 HOURS
Qty: 0 | Refills: 0 | Status: DISCONTINUED | OUTPATIENT
Start: 2018-05-16 | End: 2018-05-16

## 2018-05-15 RX ORDER — CEFAZOLIN SODIUM 1 G
2000 VIAL (EA) INJECTION ONCE
Qty: 0 | Refills: 0 | Status: COMPLETED | OUTPATIENT
Start: 2018-05-16 | End: 2018-05-16

## 2018-05-16 ENCOUNTER — OUTPATIENT (OUTPATIENT)
Dept: INPATIENT UNIT | Facility: HOSPITAL | Age: 68
LOS: 1 days | Discharge: HOME | End: 2018-05-16
Payer: COMMERCIAL

## 2018-05-16 VITALS — WEIGHT: 250 LBS | HEIGHT: 70 IN

## 2018-05-16 DIAGNOSIS — S91.301A UNSPECIFIED OPEN WOUND, RIGHT FOOT, INITIAL ENCOUNTER: ICD-10-CM

## 2018-05-16 DIAGNOSIS — S81.802A UNSPECIFIED OPEN WOUND, LEFT LOWER LEG, INITIAL ENCOUNTER: ICD-10-CM

## 2018-05-16 RX ORDER — DEXTROSE MONOHYDRATE, SODIUM CHLORIDE, AND POTASSIUM CHLORIDE 50; .745; 4.5 G/1000ML; G/1000ML; G/1000ML
1000 INJECTION, SOLUTION INTRAVENOUS
Qty: 0 | Refills: 0 | Status: DISCONTINUED | OUTPATIENT
Start: 2018-05-16 | End: 2018-05-17

## 2018-05-16 RX ORDER — PROPRANOLOL HCL 160 MG
1 CAPSULE, EXTENDED RELEASE 24HR ORAL
Qty: 0 | Refills: 0 | COMMUNITY

## 2018-05-16 RX ORDER — MONTELUKAST 4 MG/1
1 TABLET, CHEWABLE ORAL
Qty: 0 | Refills: 0 | COMMUNITY

## 2018-05-16 RX ORDER — OXYCODONE HYDROCHLORIDE 5 MG/1
10 TABLET ORAL EVERY 4 HOURS
Qty: 0 | Refills: 0 | Status: DISCONTINUED | OUTPATIENT
Start: 2018-05-16 | End: 2018-05-17

## 2018-05-16 RX ORDER — MONTELUKAST 4 MG/1
10 TABLET, CHEWABLE ORAL DAILY
Qty: 0 | Refills: 0 | Status: DISCONTINUED | OUTPATIENT
Start: 2018-05-16 | End: 2018-05-17

## 2018-05-16 RX ORDER — FLUTICASONE PROPIONATE AND SALMETEROL 50; 250 UG/1; UG/1
1 POWDER ORAL; RESPIRATORY (INHALATION)
Qty: 0 | Refills: 0 | COMMUNITY

## 2018-05-16 RX ORDER — ACETAMINOPHEN 500 MG
650 TABLET ORAL EVERY 6 HOURS
Qty: 0 | Refills: 0 | Status: DISCONTINUED | OUTPATIENT
Start: 2018-05-16 | End: 2018-05-17

## 2018-05-16 RX ORDER — BUDESONIDE AND FORMOTEROL FUMARATE DIHYDRATE 160; 4.5 UG/1; UG/1
2 AEROSOL RESPIRATORY (INHALATION)
Qty: 0 | Refills: 0 | Status: DISCONTINUED | OUTPATIENT
Start: 2018-05-16 | End: 2018-05-17

## 2018-05-16 RX ORDER — PROPRANOLOL HCL 160 MG
80 CAPSULE, EXTENDED RELEASE 24HR ORAL DAILY
Qty: 0 | Refills: 0 | Status: DISCONTINUED | OUTPATIENT
Start: 2018-05-16 | End: 2018-05-17

## 2018-05-16 RX ORDER — ONDANSETRON 8 MG/1
4 TABLET, FILM COATED ORAL EVERY 4 HOURS
Qty: 0 | Refills: 0 | Status: DISCONTINUED | OUTPATIENT
Start: 2018-05-16 | End: 2018-05-16

## 2018-05-16 RX ORDER — HEPARIN SODIUM 5000 [USP'U]/ML
5000 INJECTION INTRAVENOUS; SUBCUTANEOUS EVERY 8 HOURS
Qty: 0 | Refills: 0 | Status: DISCONTINUED | OUTPATIENT
Start: 2018-05-16 | End: 2018-05-17

## 2018-05-16 RX ORDER — IBUPROFEN 200 MG
600 TABLET ORAL EVERY 6 HOURS
Qty: 0 | Refills: 0 | Status: DISCONTINUED | OUTPATIENT
Start: 2018-05-16 | End: 2018-05-17

## 2018-05-16 RX ORDER — HYDROMORPHONE HYDROCHLORIDE 2 MG/ML
0.3 INJECTION INTRAMUSCULAR; INTRAVENOUS; SUBCUTANEOUS
Qty: 0 | Refills: 0 | Status: DISCONTINUED | OUTPATIENT
Start: 2018-05-16 | End: 2018-05-16

## 2018-05-16 RX ORDER — LORATADINE 10 MG/1
10 TABLET ORAL DAILY
Qty: 0 | Refills: 0 | Status: DISCONTINUED | OUTPATIENT
Start: 2018-05-16 | End: 2018-05-17

## 2018-05-16 RX ORDER — OXYCODONE HYDROCHLORIDE 5 MG/1
5 TABLET ORAL EVERY 4 HOURS
Qty: 0 | Refills: 0 | Status: DISCONTINUED | OUTPATIENT
Start: 2018-05-16 | End: 2018-05-17

## 2018-05-16 RX ORDER — CEFAZOLIN SODIUM 1 G
2000 VIAL (EA) INJECTION EVERY 8 HOURS
Qty: 0 | Refills: 0 | Status: DISCONTINUED | OUTPATIENT
Start: 2018-05-16 | End: 2018-05-17

## 2018-05-16 RX ORDER — HYDROCHLOROTHIAZIDE 25 MG
12.5 TABLET ORAL DAILY
Qty: 0 | Refills: 0 | Status: DISCONTINUED | OUTPATIENT
Start: 2018-05-16 | End: 2018-05-17

## 2018-05-16 RX ADMIN — Medication 100 MILLIGRAM(S): at 21:05

## 2018-05-16 RX ADMIN — OXYCODONE HYDROCHLORIDE 5 MILLIGRAM(S): 5 TABLET ORAL at 21:36

## 2018-05-16 RX ADMIN — HYDROMORPHONE HYDROCHLORIDE 0.3 MILLIGRAM(S): 2 INJECTION INTRAMUSCULAR; INTRAVENOUS; SUBCUTANEOUS at 14:45

## 2018-05-16 RX ADMIN — HEPARIN SODIUM 5000 UNIT(S): 5000 INJECTION INTRAVENOUS; SUBCUTANEOUS at 21:05

## 2018-05-16 RX ADMIN — MONTELUKAST 10 MILLIGRAM(S): 4 TABLET, CHEWABLE ORAL at 21:06

## 2018-05-16 RX ADMIN — OXYCODONE HYDROCHLORIDE 5 MILLIGRAM(S): 5 TABLET ORAL at 21:06

## 2018-05-16 RX ADMIN — BUDESONIDE AND FORMOTEROL FUMARATE DIHYDRATE 2 PUFF(S): 160; 4.5 AEROSOL RESPIRATORY (INHALATION) at 19:45

## 2018-05-16 RX ADMIN — HYDROMORPHONE HYDROCHLORIDE 0.3 MILLIGRAM(S): 2 INJECTION INTRAMUSCULAR; INTRAVENOUS; SUBCUTANEOUS at 14:31

## 2018-05-16 RX ADMIN — DEXTROSE MONOHYDRATE, SODIUM CHLORIDE, AND POTASSIUM CHLORIDE 50 MILLILITER(S): 50; .745; 4.5 INJECTION, SOLUTION INTRAVENOUS at 19:52

## 2018-05-16 NOTE — BRIEF OPERATIVE NOTE - PROCEDURE
<<-----Click on this checkbox to enter Procedure Application of split-thickness skin graft (STSG) to lower extremity  05/16/2018  LLE  Active  SDELLIS1  Debridement  05/16/2018  LLE  Active  SDELLIS1

## 2018-05-16 NOTE — BRIEF OPERATIVE NOTE - OPERATION/FINDINGS
Patient had removal of his existing vac and Integra, debridement of his LLE wound, a STSG from the L thigh, and placement of the graft/Integra/vac over it.

## 2018-05-17 ENCOUNTER — TRANSCRIPTION ENCOUNTER (OUTPATIENT)
Age: 68
End: 2018-05-17

## 2018-05-17 VITALS
RESPIRATION RATE: 17 BRPM | DIASTOLIC BLOOD PRESSURE: 76 MMHG | SYSTOLIC BLOOD PRESSURE: 131 MMHG | TEMPERATURE: 98 F | HEART RATE: 77 BPM | OXYGEN SATURATION: 96 %

## 2018-05-17 PROCEDURE — 15120 SPLT AGRFT F/S/N/H/F/G/M 1ST: CPT

## 2018-05-17 PROCEDURE — 99261: CPT

## 2018-05-17 PROCEDURE — 15121 SPLT AGRFT F/S/N/H/F/G/M EA: CPT

## 2018-05-17 PROCEDURE — 15100 SPLT AGRFT T/A/L 1ST 100SQCM: CPT

## 2018-05-17 PROCEDURE — 94640 AIRWAY INHALATION TREATMENT: CPT

## 2018-05-17 PROCEDURE — 15101 SPLT AGRFT T/A/L EA ADDL 100: CPT

## 2018-05-17 RX ORDER — CEPHALEXIN 500 MG
1 CAPSULE ORAL
Qty: 14 | Refills: 0 | OUTPATIENT
Start: 2018-05-17 | End: 2018-05-23

## 2018-05-17 RX ADMIN — OXYCODONE HYDROCHLORIDE 10 MILLIGRAM(S): 5 TABLET ORAL at 02:53

## 2018-05-17 RX ADMIN — HEPARIN SODIUM 5000 UNIT(S): 5000 INJECTION INTRAVENOUS; SUBCUTANEOUS at 06:02

## 2018-05-17 RX ADMIN — BUDESONIDE AND FORMOTEROL FUMARATE DIHYDRATE 2 PUFF(S): 160; 4.5 AEROSOL RESPIRATORY (INHALATION) at 06:02

## 2018-05-17 RX ADMIN — Medication 80 MILLIGRAM(S): at 06:03

## 2018-05-17 RX ADMIN — OXYCODONE HYDROCHLORIDE 5 MILLIGRAM(S): 5 TABLET ORAL at 09:59

## 2018-05-17 RX ADMIN — OXYCODONE HYDROCHLORIDE 10 MILLIGRAM(S): 5 TABLET ORAL at 02:23

## 2018-05-17 RX ADMIN — Medication 100 MILLIGRAM(S): at 06:03

## 2018-05-17 RX ADMIN — Medication 12.5 MILLIGRAM(S): at 06:03

## 2018-05-17 NOTE — DISCHARGE NOTE ADULT - CONDITIONS AT DISCHARGE
Patient a&ox4. Patient VSS. Patient safety maintained. Patient IV removed with no signs/symptoms of redness/swelling.

## 2018-05-17 NOTE — DISCHARGE NOTE ADULT - MEDICATION SUMMARY - MEDICATIONS TO TAKE
I will START or STAY ON the medications listed below when I get home from the hospital:    ibuprofen 600 mg oral tablet  -- 1 tab(s) by mouth every 8 hours, As needed, left foot pain  -- Indication: For as needed, for mild pain    oxyCODONE-acetaminophen 5 mg-325 mg oral tablet  -- 1 tab(s) by mouth every 4 hours, As needed, Moderate Pain (4 - 6) MDD:6 tabs  -- Indication: For as needed, for moderate pain    propranolol 80 mg oral capsule, extended release  -- 1 cap(s) by mouth once a day  -- Indication: For heart arrhythmia    enoxaparin 40 mg/0.4 mL injectable solution  -- 1  injectable once a day  -- Indication: For blood thinner    Claritin 10 mg oral tablet  -- 1 tab(s) by mouth once a day  -- Indication: For anti-histamine    Advair Diskus 250 mcg-50 mcg inhalation powder  -- 1 puff(s) inhaled 2 times a day  -- Indication: For asthma    Keflex 500 mg oral capsule  -- 1 cap(s) by mouth 2 times a day  -- Finish all this medication unless otherwise directed by prescriber.    -- Indication: For prevent wound infection    hydroCHLOROthiazide 12.5 mg oral tablet  -- 1 tab(s) by mouth once a day  -- Indication: For hypertension    Singulair 10 mg oral tablet  -- 1 tab(s) by mouth once a day  -- Indication: For asthma    Fish Oil 1000 mg oral capsule  -- orally once a day  -- Indication: For nutrition    CoQ10  -- 200 milligram(s) by mouth once a day  -- Indication: For nutrition    Vitamin D3 5000 intl units oral capsule  -- 1 cap(s) by mouth once a day  -- Indication: For nutrition

## 2018-05-17 NOTE — DISCHARGE NOTE ADULT - PLAN OF CARE
recovery from debridement and skin graft surgery You have had surgery on your left lower extremity. Continue to keep your leg elevated and keep the dressings dry. You have been given extra reinforcing bandages for the thigh. Please take the antibiotics that you were given, as prescribed. Please call the office to re-schedule your follow-up appointment for Thursday and call with any questions.

## 2018-05-17 NOTE — PROGRESS NOTE ADULT - SUBJECTIVE AND OBJECTIVE BOX
VAC with good seal. Donor site dressing changed    DC home with elevation  PO Abx x 7 days  FU Thursday

## 2018-05-17 NOTE — DISCHARGE NOTE ADULT - CARE PROVIDER_API CALL
Francois Moody), Plastic Surgery; Surgery; Surgical Critical Care  [address] [place] [address]  Phone: [phone]  Fax: [phone]

## 2018-05-17 NOTE — DISCHARGE NOTE ADULT - INSTRUCTIONS
No restrictions. If unable to tolerate diet, nausea, vomiting, fever above 100.3, chills, or an increase in pain, notify provider or return to ER.

## 2018-05-17 NOTE — DISCHARGE NOTE ADULT - HOSPITAL COURSE
The patient is a 67M with a history of OR surgical debridement of LLE and placement of an Integra graft and vac last month for necrotizing skin. He presented on 5/16 for scheduled follow-up surgery and underwent debridement of the LLE wound, application of STSG from the left thigh, and placement of Integra and vac over it. He tolerated the procedure well and was on bedrest for the immediate post-operative period. At the time of discharge, he had pain control, understood his antibiotic regimen, felt comfortable with his dressings, knew to elevate his leg, and was set to follow-up with Dr. Moody next week.

## 2018-05-17 NOTE — PROGRESS NOTE ADULT - SUBJECTIVE AND OBJECTIVE BOX
PLASTIC SURGERY DAILY PROGRESS NOTE:    S: Patient seen and examined. No acute events overnight. Pain well controlled with current regimen.     O:  Vital Signs Last 24 Hrs  T(C): 36.8 (17 May 2018 05:28), Max: 36.8 (16 May 2018 20:00)  T(F): 98.3 (17 May 2018 05:28), Max: 98.3 (17 May 2018 05:28)  HR: 84 (17 May 2018 05:28) (71 - 89)  BP: 115/71 (17 May 2018 05:28) (106/71 - 134/60)  BP(mean): 83 (16 May 2018 16:00) (83 - 88)  RR: 18 (17 May 2018 05:28) (15 - 18)  SpO2: 96% (17 May 2018 05:28) (93% - 96%)    I&O's Detail    16 May 2018 07:01  -  17 May 2018 06:50  --------------------------------------------------------  IN:    dextrose 5% + sodium chloride 0.45% with potassium chloride 20 mEq/L: 200 mL    Oral Fluid: 150 mL  Total IN: 350 mL    OUT:    Voided: 1300 mL  Total OUT: 1300 mL    Total NET: -950 mL    MEDICATIONS  (STANDING):  buDESOnide 160 MICROgram(s)/formoterol 4.5 MICROgram(s) Inhaler 2 Puff(s) Inhalation two times a day  ceFAZolin   IVPB 2000 milliGRAM(s) IV Intermittent every 8 hours  dextrose 5% + sodium chloride 0.45% with potassium chloride 20 mEq/L 1000 milliLiter(s) (50 mL/Hr) IV Continuous <Continuous>  heparin  Injectable 5000 Unit(s) SubCutaneous every 8 hours  hydrochlorothiazide 12.5 milliGRAM(s) Oral daily  loratadine 10 milliGRAM(s) Oral daily  montelukast 10 milliGRAM(s) Oral daily  propranolol LA 80 milliGRAM(s) Oral daily    MEDICATIONS  (PRN):  acetaminophen   Tablet. 650 milliGRAM(s) Oral every 6 hours PRN Mild Pain (1 - 3)  ibuprofen  Tablet 600 milliGRAM(s) Oral every 6 hours PRN mild pain  oxyCODONE    IR 5 milliGRAM(s) Oral every 4 hours PRN Moderate Pain (4 - 6)  oxyCODONE    IR 10 milliGRAM(s) Oral every 4 hours PRN Severe Pain (7 - 10)    Physical Exam:  Gen: Laying in bed, NAD, alert and oriented  Resp: Unlabored breathing  LLE: elevated, in splint, donor site with Aquacell under Tegaderms    Lines:   IV: patent, in place.

## 2018-05-17 NOTE — DISCHARGE NOTE ADULT - CARE PLAN
Principal Discharge DX:	Wound of left lower extremity  Goal:	recovery from debridement and skin graft surgery  Assessment and plan of treatment:	You have had surgery on your left lower extremity. Continue to keep your leg elevated and keep the dressings dry. You have been given extra reinforcing bandages for the thigh. Please take the antibiotics that you were given, as prescribed. Please call the office to re-schedule your follow-up appointment for Thursday and call with any questions.

## 2018-05-17 NOTE — PROGRESS NOTE ADULT - ASSESSMENT
A: 67M POD#1 s/p debridement of LLE, STSG from L thigh and placement of Integra/vac over it.    P:  - can ambulate today  - will change outer dressing of donor site  - patient has home vac set-up  - d/c planning

## 2018-05-17 NOTE — DISCHARGE NOTE ADULT - PATIENT PORTAL LINK FT
You can access the [facility] Patient Portal, offered by [facility], by registering with the following website: [email or web address]

## 2018-05-24 RX ORDER — UBIDECARENONE 100 MG
200 CAPSULE ORAL
Qty: 0 | Refills: 0 | COMMUNITY

## 2018-05-24 RX ORDER — OMEGA-3 ACID ETHYL ESTERS 1 G
0 CAPSULE ORAL
Qty: 0 | Refills: 0 | COMMUNITY

## 2018-05-24 RX ORDER — LORATADINE 10 MG/1
1 TABLET ORAL
Qty: 0 | Refills: 0 | COMMUNITY

## 2018-05-24 RX ORDER — ENOXAPARIN SODIUM 100 MG/ML
1 INJECTION SUBCUTANEOUS
Qty: 0 | Refills: 0 | COMMUNITY

## 2018-05-24 RX ORDER — CHOLECALCIFEROL (VITAMIN D3) 125 MCG
1 CAPSULE ORAL
Qty: 0 | Refills: 0 | COMMUNITY

## 2018-12-28 PROBLEM — G43.909 MIGRAINE, UNSPECIFIED, NOT INTRACTABLE, WITHOUT STATUS MIGRAINOSUS: Chronic | Status: ACTIVE | Noted: 2018-04-06

## 2019-01-02 ENCOUNTER — APPOINTMENT (OUTPATIENT)
Dept: VASCULAR SURGERY | Facility: CLINIC | Age: 69
End: 2019-01-02
Payer: COMMERCIAL

## 2019-01-02 VITALS
TEMPERATURE: 97.8 F | DIASTOLIC BLOOD PRESSURE: 90 MMHG | BODY MASS INDEX: 38.08 KG/M2 | HEIGHT: 69.5 IN | SYSTOLIC BLOOD PRESSURE: 135 MMHG | HEART RATE: 62 BPM | WEIGHT: 263 LBS

## 2019-01-02 PROCEDURE — 99204 OFFICE O/P NEW MOD 45 MIN: CPT

## 2019-01-02 PROCEDURE — 93971 EXTREMITY STUDY: CPT

## 2019-01-09 ENCOUNTER — APPOINTMENT (OUTPATIENT)
Dept: VASCULAR SURGERY | Facility: CLINIC | Age: 69
End: 2019-01-09
Payer: COMMERCIAL

## 2019-01-09 VITALS
TEMPERATURE: 97.8 F | WEIGHT: 263 LBS | HEIGHT: 70 IN | BODY MASS INDEX: 37.65 KG/M2 | DIASTOLIC BLOOD PRESSURE: 92 MMHG | HEART RATE: 71 BPM | SYSTOLIC BLOOD PRESSURE: 145 MMHG

## 2019-01-09 PROCEDURE — 29580 STRAPPING UNNA BOOT: CPT | Mod: LT

## 2019-01-09 NOTE — PHYSICAL EXAM
[2+] : left 2+ [Ankle Swelling (On Exam)] : present [Ankle Swelling On The Left] : of the left ankle [Varicose Veins Of Lower Extremities] : bilaterally [] : of the left leg [Ankle Swelling On The Right] : mild [Alert] : alert [Oriented to Person] : oriented to person [Oriented to Place] : oriented to place [Oriented to Time] : oriented to time [Calm] : calm [de-identified] : well  [FreeTextEntry1] : Left leg lateral ankle wound w/ partial granulation and mild-moderate serous drainage and anterior distal shin superficial wounds healing well. Severely xerotic skin. Gaiter area w/ mod-severe chronic venous changes. No s/s of infection. Left leg edema significantly improved  [de-identified] : WDL except ambulates w/ cane  [de-identified] : cooperative

## 2019-01-09 NOTE — REVIEW OF SYSTEMS
[As Noted in HPI] : as noted in HPI [Skin Wound] : skin wound [Dry Skin] : dry skin [Difficulty Walking] : difficulty walking [Negative] : Psychiatric [FreeTextEntry9] : Hip Sx [de-identified] : Chronic back pain

## 2019-01-09 NOTE — HISTORY OF PRESENT ILLNESS
[FreeTextEntry1] : 69 y/o m w/ history of left leg necrotizing skin infection s/p debridement, fasciotomies and skin grafting by another physician. He is here for f/u on the residual wound he has on the left ankle. He tolerated a left leg unna boot without complications but reports drainage through the boot. No fever or chills. No longer on antibiotics. He denies pain or claudication.

## 2019-01-09 NOTE — DATA REVIEWED
[FreeTextEntry1] : 1/2/19 Venous Doppler: Left leg chronic recannalized DVT in FV prox-distal, POP V and 2 out of 4 gastroc veins and PT veins. + Reflux noted in GSV >2 secs, prox GSV measuring 8.5mm

## 2019-01-09 NOTE — ASSESSMENT
[Arterial/Venous Disease] : arterial/venous disease [Foot care/Footwear] : foot care/footwear [Ulcer Care] : ulcer care [FreeTextEntry1] : 67 y/o m w/ VI and left ankle wound. Tolerated unna boot without complications. \par \par Medical Conservative Management compression therapy\par Continue left leg unna boot, but increase to 2x weekly d/t the amount of drainage. \par Offered pt. home care services and he prefers to come to office. Once drainage decreases we may resume 1x week treatment\par Discussed possible vein ablation in the future and the risks involved given chronic DVT. It is recommended we treat conservatively w/ aggressive compression therapy first. Pt. agrees with plan\par RTO in 1 week to re eval and unna boot change

## 2019-01-14 ENCOUNTER — APPOINTMENT (OUTPATIENT)
Dept: VASCULAR SURGERY | Facility: CLINIC | Age: 69
End: 2019-01-14
Payer: COMMERCIAL

## 2019-01-14 VITALS
WEIGHT: 263 LBS | DIASTOLIC BLOOD PRESSURE: 85 MMHG | TEMPERATURE: 98.1 F | BODY MASS INDEX: 37.74 KG/M2 | SYSTOLIC BLOOD PRESSURE: 132 MMHG

## 2019-01-14 VITALS — HEIGHT: 70 IN

## 2019-01-14 PROCEDURE — 29580 STRAPPING UNNA BOOT: CPT | Mod: LT

## 2019-01-14 NOTE — REVIEW OF SYSTEMS
[As Noted in HPI] : as noted in HPI [Skin Wound] : skin wound [Dry Skin] : dry skin [Difficulty Walking] : difficulty walking [Negative] : Psychiatric [FreeTextEntry9] : Hip Sx [de-identified] : Chronic back pain

## 2019-01-14 NOTE — HISTORY OF PRESENT ILLNESS
[FreeTextEntry1] : 67 y/o m w/ history of left leg necrotizing skin infection s/p debridement, fasciotomies and skin grafting by another physician. He has been tolerating a left leg unna boot for his left ankle wound and swelling without complications. No fever or chills. He denies pain or claudication.

## 2019-01-14 NOTE — PHYSICAL EXAM
[2+] : left 2+ [Ankle Swelling (On Exam)] : present [Ankle Swelling On The Left] : of the left ankle [Varicose Veins Of Lower Extremities] : bilaterally [] : of the left leg [Ankle Swelling On The Right] : mild [Alert] : alert [Oriented to Person] : oriented to person [Oriented to Place] : oriented to place [Oriented to Time] : oriented to time [Calm] : calm [de-identified] : well  [FreeTextEntry1] : Left leg lateral ankle wound w/ partial granulation and mild-moderate serosanguinous drainage. Approx 3.2cm x 2cm x 0.1mm. Severely xerotic skin. Gaiter area w/ mod-severe chronic venous changes. No s/s of infection. Left leg edema significantly improved  [de-identified] : WDL except ambulates w/ cane  [de-identified] : cooperative

## 2019-01-14 NOTE — PROCEDURE
[FreeTextEntry1] : Left leg unna boot re applied from toes to below knee w/out complications\par Foam dressing applied to draining lateral ankle wound

## 2019-01-14 NOTE — ASSESSMENT
[Arterial/Venous Disease] : arterial/venous disease [Foot care/Footwear] : foot care/footwear [Ulcer Care] : ulcer care [FreeTextEntry1] : 67 y/o m w/ VI and left ankle wound. Tolerating unna boot without complications. \par \par Medical Conservative Management compression therapy\par Continue left leg unna boot 2x weekly d/t drainage, once drainage decreases we may resume 1x weekly unna boot changes \par Offered pt. home care services and he prefers to come to office for 1 more visit. \par Previously discussed possible vein ablation in the future and the risks involved given chronic DVT. Again it is recommended we treat conservatively w/ aggressive compression therapy first. Pt. agrees with plan\par RTO  this week to re eval and unna boot change, will arrange home care at that time. Pt agrees

## 2019-01-17 ENCOUNTER — APPOINTMENT (OUTPATIENT)
Dept: VASCULAR SURGERY | Facility: CLINIC | Age: 69
End: 2019-01-17
Payer: COMMERCIAL

## 2019-01-17 VITALS
BODY MASS INDEX: 37.65 KG/M2 | WEIGHT: 263 LBS | HEART RATE: 66 BPM | HEIGHT: 70 IN | DIASTOLIC BLOOD PRESSURE: 83 MMHG | SYSTOLIC BLOOD PRESSURE: 142 MMHG | TEMPERATURE: 98 F

## 2019-01-17 PROCEDURE — 29580 STRAPPING UNNA BOOT: CPT | Mod: LT

## 2019-01-17 RX ORDER — VARENICLINE TARTRATE 1 MG/1
1 TABLET, FILM COATED ORAL
Qty: 60 | Refills: 1 | Status: DISCONTINUED | COMMUNITY
Start: 2017-01-05 | End: 2019-01-17

## 2019-01-17 NOTE — PHYSICAL EXAM
[2+] : left 2+ [Ankle Swelling (On Exam)] : present [Ankle Swelling On The Left] : of the left ankle [Varicose Veins Of Lower Extremities] : bilaterally [] : of the left leg [Ankle Swelling On The Right] : mild [Alert] : alert [Oriented to Person] : oriented to person [Oriented to Place] : oriented to place [Oriented to Time] : oriented to time [Calm] : calm [de-identified] : well  [FreeTextEntry1] : Left leg lateral ankle wound w/ partial granulation and mild-moderate serous drainage. Approx 3.2cm x 2cm. Severely xerotic skin. Gaiter area w/ mod-severe chronic venous changes. No s/s of infection.  [de-identified] : WDL except ambulates w/ cane  [de-identified] : cooperative

## 2019-01-17 NOTE — REVIEW OF SYSTEMS
[As Noted in HPI] : as noted in HPI [Skin Wound] : skin wound [Dry Skin] : dry skin [Difficulty Walking] : difficulty walking [Negative] : Psychiatric [FreeTextEntry9] : Hip Sx [de-identified] : Chronic back pain

## 2019-01-17 NOTE — HISTORY OF PRESENT ILLNESS
[FreeTextEntry1] : 69 y/o m w/ history of left leg necrotizing skin infection s/p debridement, fasciotomies and skin grafting by another physician in May 2018. He has been tolerating a left leg unna boot for his left ankle wound and swelling without complications. Reports swelling is significantly improved. No fever or chills. He denies pain or claudication.

## 2019-01-17 NOTE — PROCEDURE
[FreeTextEntry1] : No sting barrier film and foam dressing applied to draining lateral ankle wound \par Left leg unna boot re applied from toes to below knee w/out complications

## 2019-01-17 NOTE — ASSESSMENT
[Arterial/Venous Disease] : arterial/venous disease [Foot care/Footwear] : foot care/footwear [Ulcer Care] : ulcer care [FreeTextEntry1] : 69 y/o m w/ VI and left ankle wound. Tolerating unna boot without complications. \par \par Medical Conservative Management compression therapy, leg elevation\par Continue left leg unna boot 3x week d/t drainage, once drainage decreases we may resume 2x week unna boot changes \par Home care services ordered and supplies given to pt. \par Previously discussed possible vein ablation in the future and the risks involved given chronic DVT. Again it is recommended we treat conservatively w/ aggressive compression therapy first. Pt. agrees with plan\par RTO in 3-4 weeks for f/u

## 2019-02-07 ENCOUNTER — APPOINTMENT (OUTPATIENT)
Dept: VASCULAR SURGERY | Facility: CLINIC | Age: 69
End: 2019-02-07
Payer: COMMERCIAL

## 2019-02-07 VITALS
WEIGHT: 263 LBS | HEIGHT: 70 IN | HEART RATE: 66 BPM | DIASTOLIC BLOOD PRESSURE: 82 MMHG | SYSTOLIC BLOOD PRESSURE: 133 MMHG | TEMPERATURE: 97.5 F | BODY MASS INDEX: 37.65 KG/M2

## 2019-02-07 PROCEDURE — 99213 OFFICE O/P EST LOW 20 MIN: CPT | Mod: 25

## 2019-02-07 PROCEDURE — 29580 STRAPPING UNNA BOOT: CPT | Mod: LT

## 2019-02-07 NOTE — REVIEW OF SYSTEMS
[As Noted in HPI] : as noted in HPI [Skin Wound] : skin wound [Dry Skin] : dry skin [Difficulty Walking] : difficulty walking [Negative] : Psychiatric [FreeTextEntry9] : Hip Sx [de-identified] : Chronic back pain

## 2019-02-07 NOTE — ASSESSMENT
[Arterial/Venous Disease] : arterial/venous disease [Foot care/Footwear] : foot care/footwear [Ulcer Care] : ulcer care [FreeTextEntry1] : 69 y/o m w/ VI, LLE chronic DVT and left ankle wound. Tolerating unna boot without complications. \par \par Medical Conservative Management compression therapy, leg elevation, diet and weight loss\par Given significant improvement, continue left leg unna boot 3x week w/ home care\par \par Previously discussed possible vein ablation and risks involved given chronic DVT. Will revisit that option in the future and continue treating conservatively w/ aggressive compression therapy. Once healed he will need compression stockings. Pt. agrees with plan\par RTO in 1 month for f/u or sooner if new problem develops or s/s worsen

## 2019-02-07 NOTE — HISTORY OF PRESENT ILLNESS
[FreeTextEntry1] : 69 y/o m w/ history of left leg necrotizing skin infection s/p debridement, fasciotomies and skin grafting by another physician in May 2018. He has been tolerating a left leg unna boot for his left ankle wound and swelling without complications. He removed his boot and showered at home prior to his visit this morning. Reports swelling is significantly improved. No fever or chills. He denies pain or claudication.

## 2019-02-07 NOTE — PHYSICAL EXAM
[2+] : left 2+ [Ankle Swelling (On Exam)] : present [Ankle Swelling On The Left] : of the left ankle [Varicose Veins Of Lower Extremities] : bilaterally [] : of the left leg [Ankle Swelling On The Right] : mild [Alert] : alert [Oriented to Person] : oriented to person [Oriented to Place] : oriented to place [Oriented to Time] : oriented to time [Calm] : calm [de-identified] : well  [FreeTextEntry1] : Left lateral ankle wound w/ hypergranulation and mild serous drainage. Significantly decreased in size, now approx 2.2cm x 1.3cm. Drainage is improved, minimal maceration to periwound. Severely xerotic skin is stable. Gaiter area w/ mod-severe chronic venous changes. No s/s of infection.  [de-identified] : WDL except ambulates w/ cane  [de-identified] : cooperative

## 2019-03-07 ENCOUNTER — APPOINTMENT (OUTPATIENT)
Dept: VASCULAR SURGERY | Facility: CLINIC | Age: 69
End: 2019-03-07
Payer: COMMERCIAL

## 2019-03-07 VITALS
HEART RATE: 69 BPM | BODY MASS INDEX: 37.94 KG/M2 | WEIGHT: 265 LBS | DIASTOLIC BLOOD PRESSURE: 84 MMHG | HEIGHT: 70 IN | TEMPERATURE: 98.7 F | SYSTOLIC BLOOD PRESSURE: 150 MMHG

## 2019-03-07 PROCEDURE — 29580 STRAPPING UNNA BOOT: CPT | Mod: LT

## 2019-03-07 PROCEDURE — 99213 OFFICE O/P EST LOW 20 MIN: CPT | Mod: 25

## 2019-03-07 RX ORDER — GABAPENTIN 600 MG/1
600 TABLET, COATED ORAL
Qty: 90 | Refills: 0 | Status: ACTIVE | COMMUNITY
Start: 2019-01-21

## 2019-03-07 NOTE — REVIEW OF SYSTEMS
[As Noted in HPI] : as noted in HPI [Skin Wound] : skin wound [Dry Skin] : dry skin [Difficulty Walking] : difficulty walking [Negative] : Psychiatric [FreeTextEntry9] : Hip Sx [de-identified] : Chronic back pain

## 2019-03-07 NOTE — HISTORY OF PRESENT ILLNESS
[FreeTextEntry1] : 67 y/o m w/ history of left leg necrotizing skin infection s/p debridement, fasciotomies and skin grafting by another physician in May 2018. He has been tolerating a left leg unna boot for his left ankle wound and swelling w/ home care services 3x week. He presents today for f/u visit but reports he developed a new wound on the left ankle approx 10 days ago. Denies any trauma or injury tot he affected area. He removed his boot and showered at home prior to his visit this morning. Reports swelling is significantly improved, and original wound is decreased in size. No fever or chills. He denies pain or claudication.

## 2019-03-07 NOTE — ASSESSMENT
[Arterial/Venous Disease] : arterial/venous disease [Foot care/Footwear] : foot care/footwear [Ulcer Care] : ulcer care [FreeTextEntry1] : 69 y/o m w/ VI, LLE chronic DVT and left foot wound. Tolerating unna boot without complications. \par \par Medical Conservative Management compression therapy, leg elevation, diet and weight loss\par Continue left leg unna boot 3x week w/ home care\par Previously discussed possible vein ablation and risks involved given chronic DVT. Will revisit that option in the future and continue treating conservatively w/ aggressive compression therapy. Once healed he will need compression stockings. Pt. agrees with plan\par RTO in 1 month for f/u or sooner if new problem develops or s/s worsen \par Letter faxed to Dr. Moody

## 2019-03-07 NOTE — PHYSICAL EXAM
[2+] : left 2+ [Ankle Swelling (On Exam)] : present [Ankle Swelling On The Left] : of the left ankle [Varicose Veins Of Lower Extremities] : bilaterally [] : of the left leg [Ankle Swelling On The Right] : mild [Alert] : alert [Oriented to Person] : oriented to person [Oriented to Place] : oriented to place [Oriented to Time] : oriented to time [Calm] : calm [de-identified] : well  [FreeTextEntry1] : Left lateral ankle wound w/ granulation and mild serous drainage. Significantly decreased in size, now approx 1.5cm x 1.4cm. Drainage remains stable, minimal maceration to periwound. Severely xerotic skin is stable. New wound to anterior ankle aspect 1.7cm x 1cm w/ granulation, no drainage. Gaiter area w/ mod-severe chronic venous changes. No s/s of infection.  [de-identified] : WDL except ambulates w/ cane  [de-identified] : cooperative

## 2019-04-11 ENCOUNTER — APPOINTMENT (OUTPATIENT)
Dept: VASCULAR SURGERY | Facility: CLINIC | Age: 69
End: 2019-04-11
Payer: COMMERCIAL

## 2019-04-11 VITALS
HEART RATE: 64 BPM | WEIGHT: 265 LBS | TEMPERATURE: 97.5 F | BODY MASS INDEX: 37.94 KG/M2 | HEIGHT: 70 IN | DIASTOLIC BLOOD PRESSURE: 82 MMHG | SYSTOLIC BLOOD PRESSURE: 125 MMHG

## 2019-04-11 PROCEDURE — 29580 STRAPPING UNNA BOOT: CPT | Mod: LT

## 2019-04-11 NOTE — PHYSICAL EXAM
[2+] : left 2+ [Ankle Swelling (On Exam)] : present [Ankle Swelling On The Left] : of the left ankle [Varicose Veins Of Lower Extremities] : bilaterally [] : of the left leg [Ankle Swelling On The Right] : mild [Alert] : alert [Oriented to Person] : oriented to person [Oriented to Place] : oriented to place [Oriented to Time] : oriented to time [Calm] : calm [de-identified] : well  [FreeTextEntry1] : Left lateral ankle wound w/ granulation and serous drainage. Approx 1.3cm x 1.2cm. Drainage remains stable, minimal maceration to periwound. Moderate xerotic skin is slightly improved. Anterior ankle aspect 2cm x 1cm w/ granulation, very superficial, mild drainage. Gaiter area w/ mod-severe chronic venous changes. No s/s of infection.  [de-identified] : cooperative  [de-identified] : WDL except ambulates w/ cane

## 2019-04-11 NOTE — ASSESSMENT
[Arterial/Venous Disease] : arterial/venous disease [Foot care/Footwear] : foot care/footwear [Ulcer Care] : ulcer care [FreeTextEntry1] : 69 y/o m w/ VI, LLE chronic DVT and left foot wounds. Tolerating unna boot without complications. \par \par Medical Conservative Management compression therapy, leg elevation, diet and weight loss\par Continue left leg unna boot 3x week w/ home care\par Previously discussed possible vein ablation and risks involved given chronic DVT. Will revisit that option in the future and continue treating conservatively w/ aggressive compression therapy. Once healed he will need compression stockings. Pt. agrees with plan\par RTO in 4-6 weeks for f/u or sooner if new problem develops or s/s worsen \par

## 2019-04-11 NOTE — HISTORY OF PRESENT ILLNESS
[FreeTextEntry1] : 69 y/o m w/ history of left leg necrotizing skin infection s/p debridement, fasciotomies and skin grafting by another physician in May 2018. He has been tolerating a left leg unna boot for his left ankle wound and swelling w/ home care services 3x week. He presents today for f/u visit but reports he developed a new wound on the left ankle approx 10 days ago. Denies any trauma or injury to the affected area. He removed his boot and showered at home prior to his visit this morning. Reports swelling is significantly improved, and original wound is decreased in size. No fever or chills. He denies pain or claudication.  [de-identified] : pt presents today for f/u visit. Still has home care for left leg unna boot 3x week. He reports improvement in outer ankle wound only, the other foot wound remains the same. Reports drainage remains the same. Swelling has improved. Denies pain. Denies any other changes since previous visit.

## 2019-04-11 NOTE — REVIEW OF SYSTEMS
[As Noted in HPI] : as noted in HPI [Skin Wound] : skin wound [Dry Skin] : dry skin [Difficulty Walking] : difficulty walking [Negative] : Psychiatric [FreeTextEntry9] : Hip Sx [de-identified] : Chronic back pain

## 2019-04-11 NOTE — PROCEDURE
[FreeTextEntry1] : No sting barrier film and foam dressing applied to ankle and foot wounds \par Left leg unna boot re applied from toes to below knee w/out complications

## 2019-05-30 ENCOUNTER — APPOINTMENT (OUTPATIENT)
Dept: VASCULAR SURGERY | Facility: CLINIC | Age: 69
End: 2019-05-30
Payer: COMMERCIAL

## 2019-05-30 VITALS
SYSTOLIC BLOOD PRESSURE: 130 MMHG | BODY MASS INDEX: 38.65 KG/M2 | TEMPERATURE: 98.3 F | DIASTOLIC BLOOD PRESSURE: 84 MMHG | HEART RATE: 64 BPM | HEIGHT: 70 IN | WEIGHT: 270 LBS

## 2019-05-30 PROCEDURE — 99213 OFFICE O/P EST LOW 20 MIN: CPT | Mod: 25

## 2019-05-30 PROCEDURE — 29580 STRAPPING UNNA BOOT: CPT | Mod: LT

## 2019-05-30 NOTE — DATA REVIEWED
Pt had a positive ppd years ago and has to have chest xray done every so often. Last xray was done 3/3/16. Is it too soon? [FreeTextEntry1] : 1/2/19 Venous Doppler: Left leg chronic recannalized DVT in FV prox-distal, POP V and 2 out of 4 gastroc veins and PT veins. + Reflux noted in GSV >2 secs, prox GSV measuring 8.5mm

## 2019-05-30 NOTE — PROCEDURE
[FreeTextEntry1] : Right anterior ankle wound silver nitrate applied\par No sting barrier film and foam dressing applied to ankle and foot wounds \par Left leg unna boot re applied from toes to below knee w/out complications

## 2019-05-30 NOTE — REVIEW OF SYSTEMS
[As Noted in HPI] : as noted in HPI [Skin Wound] : skin wound [Dry Skin] : dry skin [Difficulty Walking] : difficulty walking [Negative] : Psychiatric [FreeTextEntry9] : Hip Sx [de-identified] : Chronic back pain

## 2019-05-30 NOTE — PHYSICAL EXAM
[2+] : left 2+ [Ankle Swelling (On Exam)] : present [Ankle Swelling On The Left] : of the left ankle [Varicose Veins Of Lower Extremities] : bilaterally [] : of the left leg [Ankle Swelling On The Right] : mild [Alert] : alert [Oriented to Person] : oriented to person [Oriented to Place] : oriented to place [Oriented to Time] : oriented to time [Calm] : calm [de-identified] : well  [FreeTextEntry1] : Left lateral ankle wound w/ granulation and scant serous drainage. Approx 1.3cm x 1cm. Minimal maceration to periwound. Moderate xerotic skin is unchanged. Anterior ankle aspect 2.5cm x 0.8cm w/ granulation, very superficial, mild sanguinous drainage. Gaiter area w/ mod-severe chronic venous changes. No s/s of infection.  [de-identified] : WDL except ambulates w/ cane  [de-identified] : cooperative

## 2019-05-30 NOTE — ASSESSMENT
[Arterial/Venous Disease] : arterial/venous disease [Other: _____] : [unfilled] [Foot care/Footwear] : foot care/footwear [Ulcer Care] : ulcer care [FreeTextEntry1] : 69 y/o m w/ VI, LLE chronic DVT and left foot wounds. Tolerating unna boot without complications. \par \par Medical Conservative Management compression therapy, leg elevation, diet and weight loss\par Continue left leg unna boot 2x week w/ home care orders given to pt\par Again, in depth discussion w/ pt to continue treating conservatively w/ aggressive compression for as long as possible. Given chronic DVT we will revisit possible vein ablation in the future and explained the risk of venous circulation being further compromised if we proceed w/ intervention. He understands this. \par Once healed he will need compression stockings. Pt. agrees with plan\par RTO in 4 weeks for f/u or sooner if new problem develops or s/s worsen \par

## 2019-05-30 NOTE — HISTORY OF PRESENT ILLNESS
[FreeTextEntry1] : 69 y/o m w/ history of left leg necrotizing skin infection s/p debridement, fasciotomies and skin grafting by another physician in May 2018. He has been tolerating a left leg unna boot for his left ankle wound and swelling w/ home care services 3x week. He presents today for f/u visit but reports he developed a new wound on the left ankle approx 10 days ago. Denies any trauma or injury to the affected area. He removed his boot and showered at home prior to his visit this morning. Reports swelling is significantly improved, and original wound is decreased in size. No fever or chills. He denies pain or claudication.  [de-identified] : pt presents today for f/u visit. Still has home care for left leg unna boot now 2 x week. He reports improvement in outer ankle wound only, the other foot wound remains the same. He says walking and using the exercise bike has made his wound slightly bigger.  Reports drainage and swelling is significantly improved. Denies pain. Denies any other changes since previous visit.

## 2019-07-03 ENCOUNTER — APPOINTMENT (OUTPATIENT)
Dept: VASCULAR SURGERY | Facility: CLINIC | Age: 69
End: 2019-07-03
Payer: COMMERCIAL

## 2019-07-03 VITALS — SYSTOLIC BLOOD PRESSURE: 141 MMHG | DIASTOLIC BLOOD PRESSURE: 88 MMHG | HEART RATE: 56 BPM

## 2019-07-03 VITALS — DIASTOLIC BLOOD PRESSURE: 79 MMHG | SYSTOLIC BLOOD PRESSURE: 128 MMHG | HEART RATE: 59 BPM

## 2019-07-03 VITALS — WEIGHT: 270 LBS | BODY MASS INDEX: 38.65 KG/M2 | TEMPERATURE: 97.4 F | HEIGHT: 70 IN

## 2019-07-03 DIAGNOSIS — Z87.891 PERSONAL HISTORY OF NICOTINE DEPENDENCE: ICD-10-CM

## 2019-07-03 PROCEDURE — 99213 OFFICE O/P EST LOW 20 MIN: CPT | Mod: 25

## 2019-07-03 PROCEDURE — 29580 STRAPPING UNNA BOOT: CPT | Mod: LT

## 2019-07-03 PROCEDURE — 93923 UPR/LXTR ART STDY 3+ LVLS: CPT

## 2019-07-03 RX ORDER — FLUTICASONE PROPIONATE AND SALMETEROL 50; 250 UG/1; UG/1
250-50 POWDER RESPIRATORY (INHALATION)
Refills: 0 | Status: ACTIVE | COMMUNITY
Start: 2019-07-03

## 2019-07-03 RX ORDER — OMEGA-3/DHA/EPA/FISH OIL 300-1000MG
1000 CAPSULE ORAL
Refills: 0 | Status: ACTIVE | COMMUNITY
Start: 2019-07-03

## 2019-07-03 RX ORDER — AMOXICILLIN AND CLAVULANATE POTASSIUM 875; 125 MG/1; MG/1
875-125 TABLET, COATED ORAL
Qty: 28 | Refills: 0 | Status: DISCONTINUED | COMMUNITY
Start: 2018-12-27 | End: 2019-07-03

## 2019-07-03 RX ORDER — OXYCODONE 5 MG/1
5 TABLET ORAL
Qty: 14 | Refills: 0 | Status: DISCONTINUED | COMMUNITY
Start: 2018-12-19 | End: 2019-07-03

## 2019-07-03 RX ORDER — TIOTROPIUM BROMIDE 18 UG/1
18 CAPSULE ORAL; RESPIRATORY (INHALATION)
Refills: 0 | Status: ACTIVE | COMMUNITY
Start: 2019-07-03

## 2019-07-03 NOTE — REVIEW OF SYSTEMS
[Leg Claudication] : intermittent leg claudication [As Noted in HPI] : as noted in HPI [Dry Skin] : dry skin [Skin Wound] : skin wound [Difficulty Walking] : difficulty walking [Negative] : Psychiatric [de-identified] : Chronic back pain [FreeTextEntry9] : right Hip Sx

## 2019-07-03 NOTE — PROCEDURE
[FreeTextEntry1] : Left leg unna boot re applied from toes to below knee w/out complications\par no sting barrier film and foam applied to wound

## 2019-07-03 NOTE — PHYSICAL EXAM
[2+] : left 2+ [1+] : left 1+ [Ankle Swelling (On Exam)] : present [Ankle Swelling On The Left] : of the left ankle [Varicose Veins Of Lower Extremities] : bilaterally [] : of the left leg [Ankle Swelling On The Right] : mild [Alert] : alert [Oriented to Person] : oriented to person [Oriented to Place] : oriented to place [Oriented to Time] : oriented to time [Calm] : calm [0] : right 0 [FreeTextEntry1] : Left lateral ankle wound superficial w/ granulation and scant serous drainage. Approx 1.2cm x 0.6mm. Minimal maceration to periwound. Mild xerotic skin. Anterior ankle wound completely healed. Gaiter area w/ mod-severe chronic venous changes. No s/s of infection. Bilateral feet are warm. No open wounds on the right leg.  [de-identified] : well  [de-identified] : WDL except ambulates w/ cane  [de-identified] : cooperative

## 2019-07-03 NOTE — DATA REVIEWED
[FreeTextEntry1] : 1/2/19 Venous Doppler: Left leg chronic recannalized DVT in FV prox-distal, POP V and 2 out of 4 gastroc veins and PT veins. + Reflux noted in GSV >2 secs, prox GSV measuring 8.5mm \par \par 7/3/19 BENNETT/PVR: Right BENNETT 0.68 and Left BENNETT 1.21 w/ blunted waveforms on the right

## 2019-07-03 NOTE — HISTORY OF PRESENT ILLNESS
[FreeTextEntry1] : 67 y/o m w/ history of left leg necrotizing skin infection s/p debridement, fasciotomies and skin grafting by another physician in May 2018. He has been tolerating a left leg unna boot for his left ankle wound and swelling w/ home care services 3x week. He presents today for f/u visit but reports he developed a new wound on the left ankle approx 10 days ago. Denies any trauma or injury to the affected area. He removed his boot and showered at home prior to his visit this morning. Reports swelling is significantly improved, and original wound is decreased in size. No fever or chills. He denies pain or claudication.  [de-identified] : pt presents today for f/u visit. Still has home care for left leg unna boot now 2 x week. He reports improvement in outer ankle wound only, the other foot wound remains the same. He says walking and using the exercise bike has made his wound slightly bigger.  Reports drainage and swelling is significantly improved. Denies pain. Denies any other changes since previous visit. \par \par 7/3/19 here for f/u visit. He reports significant improvement in left ankle wound healing. Continuing unna boot 2x week w/ home care. He is c/o right calf claudication when he walks approx 100ft. Reports he has had this for years but mentioning it for the first time today. He also reports having a stent placed in the right leg approx 3 years ago but unsure why. Was seeing vasc. Dr. Maciej Sewell. Denies rest pain. As per pt. he quit smoking 2 years ago

## 2019-07-03 NOTE — ASSESSMENT
[Arterial/Venous Disease] : arterial/venous disease [Other: _____] : [unfilled] [Foot care/Footwear] : foot care/footwear [Ulcer Care] : ulcer care [FreeTextEntry1] : 69 y/o m w/ PVD- LLE chronic DVT and left foot wounds slowly healing. Right leg intermittent claudication, no open wounds. Right BENNETT 0.68 consistent w/ moderate disease\par \par Medical Conservative Management left leg compression therapy, leg elevation, diet and weight loss\par Continue left leg unna boot 2x week w/ home care \par Again, in depth discussion w/ pt to continue treating conservatively w/ aggressive compression for as long as possible. Given chronic DVT we will revisit possible vein ablation in the future and explained the risk of venous circulation being further compromised if we proceed w/ intervention. He understands this. \par Once healed he will need compression stockings. Pt. agrees with plan\par Start ASA, Statin and Cilostazol for intermittent claudication\par RTO in 6-8 weeks for f/u or sooner if new problem develops or s/s worsen \par

## 2019-08-20 ENCOUNTER — APPOINTMENT (OUTPATIENT)
Dept: VASCULAR SURGERY | Facility: CLINIC | Age: 69
End: 2019-08-20
Payer: COMMERCIAL

## 2019-08-20 VITALS
HEART RATE: 78 BPM | DIASTOLIC BLOOD PRESSURE: 78 MMHG | SYSTOLIC BLOOD PRESSURE: 114 MMHG | BODY MASS INDEX: 37.94 KG/M2 | TEMPERATURE: 98.6 F | WEIGHT: 265 LBS | HEIGHT: 70 IN

## 2019-08-20 PROCEDURE — 99213 OFFICE O/P EST LOW 20 MIN: CPT | Mod: 25

## 2019-08-20 PROCEDURE — 29580 STRAPPING UNNA BOOT: CPT | Mod: LT

## 2019-08-20 NOTE — ASSESSMENT
[Arterial/Venous Disease] : arterial/venous disease [Other: _____] : [unfilled] [Foot care/Footwear] : foot care/footwear [Ulcer Care] : ulcer care [FreeTextEntry1] : 70 y/o m w/ PVD- LLE chronic DVT and left foot wounds slowly healing. Right leg intermittent claudication, no open wounds. Right BENNETT 0.68 consistent w/ moderate disease\par \par Medical Conservative Management left leg compression therapy, leg elevation, diet and weight loss\par Continue left leg unna boot 2x week w/ home care \par Again, in depth discussion w/ pt to continue treating conservatively w/ aggressive compression for as long as possible. Given chronic DVT we will revisit possible vein ablation in the future and explained the risk of venous circulation being further compromised if we proceed w/ intervention. He understands this. \par Once healed he will need compression stockings. Pt. agrees with plan\par Continue ASA and Statin \par Pt. would like to increase Cilostazol to 100mg BID \par RTO in 6-8 weeks for f/u or sooner if new problem develops or s/s worsen \par

## 2019-08-20 NOTE — HISTORY OF PRESENT ILLNESS
[FreeTextEntry1] : 68 y/o m w/ history of left leg necrotizing skin infection s/p debridement, fasciotomies and skin grafting by another physician in May 2018. He has been tolerating a left leg unna boot for his left ankle wound and swelling w/ home care services 3x week. He presents today for f/u visit but reports he developed a new wound on the left ankle approx 10 days ago. Denies any trauma or injury to the affected area. He removed his boot and showered at home prior to his visit this morning. Reports swelling is significantly improved, and original wound is decreased in size. No fever or chills. He denies pain or claudication.  [de-identified] : pt presents today for f/u visit. Still has home care for left leg unna boot now 2 x week. He reports improvement in outer ankle wound only, the other foot wound remains the same. He says walking and using the exercise bike has made his wound slightly bigger.  Reports drainage and swelling is significantly improved. Denies pain. Denies any other changes since previous visit. \par \par 7/3/19 here for f/u visit. He reports significant improvement in left ankle wound healing. Continuing unna boot 2x week w/ home care. He is c/o right calf claudication when he walks approx 100ft. Reports he has had this for years but mentioning it for the first time today. He also reports having a stent placed in the right leg approx 3 years ago but unsure why. Was seeing vasc. Dr. Maciej Sewell. Denies rest pain. As per pt. he quit smoking 2 years ago \par \par 8/20/19 here for f/u visit on left leg wound and right leg claudication. He was started on Cilostazol w/out relief. He reports his left leg wounds are slowly improving but now has dry skin. Denies pain.

## 2019-08-20 NOTE — PHYSICAL EXAM
[2+] : left 2+ [0] : right 0 [1+] : left 1+ [Ankle Swelling (On Exam)] : present [Ankle Swelling On The Left] : of the left ankle [Varicose Veins Of Lower Extremities] : present [] : of the left leg [Ankle Swelling On The Right] : mild [Alert] : alert [Oriented to Person] : oriented to person [Oriented to Place] : oriented to place [Oriented to Time] : oriented to time [Calm] : calm [de-identified] : well  [FreeTextEntry1] : Left lateral ankle wound superficial w/ granulation and scant serous drainage. Approx 0.9cm x 0.5cm granulating and superficial. Minimal maceration to periwound. Mild-moderate xerotic skin. Gaiter area w/ mod-severe chronic venous changes. No s/s of infection. Bilateral feet are warm. No open wounds on the right leg.  [de-identified] : WDL except ambulates w/ cane  [de-identified] : cooperative

## 2019-08-20 NOTE — REVIEW OF SYSTEMS
[Leg Claudication] : intermittent leg claudication [As Noted in HPI] : as noted in HPI [Skin Wound] : skin wound [Dry Skin] : dry skin [Difficulty Walking] : difficulty walking [Negative] : Psychiatric [FreeTextEntry9] : right Hip Sx [de-identified] : Chronic back pain

## 2019-10-01 ENCOUNTER — APPOINTMENT (OUTPATIENT)
Dept: VASCULAR SURGERY | Facility: CLINIC | Age: 69
End: 2019-10-01
Payer: COMMERCIAL

## 2019-10-01 VITALS
DIASTOLIC BLOOD PRESSURE: 78 MMHG | BODY MASS INDEX: 37.94 KG/M2 | TEMPERATURE: 98.4 F | WEIGHT: 265 LBS | SYSTOLIC BLOOD PRESSURE: 127 MMHG | HEIGHT: 70 IN | HEART RATE: 72 BPM

## 2019-10-01 DIAGNOSIS — I87.2 VENOUS INSUFFICIENCY (CHRONIC) (PERIPHERAL): ICD-10-CM

## 2019-10-01 DIAGNOSIS — I82.502 CHRONIC EMBOLISM AND THROMBOSIS OF UNSPECIFIED DEEP VEINS OF LEFT LOWER EXTREMITY: ICD-10-CM

## 2019-10-01 PROCEDURE — 99213 OFFICE O/P EST LOW 20 MIN: CPT

## 2019-10-01 NOTE — ASSESSMENT
[Arterial/Venous Disease] : arterial/venous disease [Other: _____] : [unfilled] [Foot care/Footwear] : foot care/footwear [Ulcer Care] : ulcer care [FreeTextEntry1] : 70 y/o m w/ PVD- LLE VI, chronic DVT left ankle wounds completely healed. Right leg intermittent claudication, no open wounds. Right BENNETT 0.68 consistent w/ moderate disease\par \par Medical Conservative Management compression stockings, leg elevation, skin moisturizer, diet and weight loss\par  Continue ASA and Statin \par Continue Cilostazol to 100mg BID \par RTO in 6 months for f/u w/ repeat BENNETT/PVR or sooner if s/s worsen or new problem develops\par

## 2019-10-01 NOTE — REVIEW OF SYSTEMS
[Leg Claudication] : intermittent leg claudication [As Noted in HPI] : as noted in HPI [Skin Wound] : skin wound [Dry Skin] : dry skin [Difficulty Walking] : difficulty walking [Negative] : Psychiatric [FreeTextEntry9] : right Hip Sx [de-identified] : Chronic back pain

## 2019-10-01 NOTE — HISTORY OF PRESENT ILLNESS
[FreeTextEntry1] : 70 y/o m w/ history of left leg necrotizing skin infection s/p debridement, fasciotomies and skin grafting by another physician in May 2018. He has been tolerating a left leg unna boot for his left ankle wound and swelling w/ home care services 3x week. He presents today for f/u visit but reports he developed a new wound on the left ankle approx 10 days ago. Denies any trauma or injury to the affected area. He removed his boot and showered at home prior to his visit this morning. Reports swelling is significantly improved, and original wound is decreased in size. No fever or chills. He denies pain or claudication.  [de-identified] : pt presents today for f/u visit. Still has home care for left leg unna boot now 2 x week. He reports improvement in outer ankle wound only, the other foot wound remains the same. He says walking and using the exercise bike has made his wound slightly bigger.  Reports drainage and swelling is significantly improved. Denies pain. Denies any other changes since previous visit. \par \par 7/3/19 here for f/u visit. He reports significant improvement in left ankle wound healing. Continuing unna boot 2x week w/ home care. He is c/o right calf claudication when he walks approx 100ft. Reports he has had this for years but mentioning it for the first time today. He also reports having a stent placed in the right leg approx 3 years ago but unsure why. Was seeing vasc. Dr. Maciej Sewell. Denies rest pain. As per pt. he quit smoking 2 years ago \par \par 8/20/19 here for f/u visit on left leg wound and right leg claudication. He was started on Cilostazol w/out relief. He reports his left leg wounds are slowly improving but now has dry skin. Denies pain. \par \par 10/1/19 pt is here for f/u visit. Left leg wound completely healed as of 3 weeks ago. Discharged from home care. Has been compliant w/ skin moisturizer and compression stocking 20-30mmHg. Right leg claudication w/ only slight improvement, remains on Cilostazol 100mg BID. Reports recent weight loss of 10 lbs and is continuing to walk and exercise. No new complaints from previous visit.

## 2019-10-01 NOTE — PHYSICAL EXAM
[2+] : left 2+ [0] : right 0 [1+] : left 1+ [Ankle Swelling (On Exam)] : present [Ankle Swelling On The Left] : of the left ankle [Varicose Veins Of Lower Extremities] : bilaterally [] : of the left leg [Ankle Swelling On The Right] : mild [Alert] : alert [Oriented to Person] : oriented to person [Oriented to Place] : oriented to place [Oriented to Time] : oriented to time [Calm] : calm [de-identified] : well  [FreeTextEntry1] : Left lateral ankle wound completely healed, trance ankle edema remains. Chronic hypopigmented area w/ mild-moderate xerotic skin.. Bilateral feet are warm. No open wounds on the right leg.  [de-identified] : WDL except ambulates w/ cane  [de-identified] : cooperative

## 2019-10-01 NOTE — DATA REVIEWED
[FreeTextEntry1] : 1/2/19 Venous Doppler: Left leg chronic recannalized DVT in FV prox-distal, POP V and 2 out of 4 gastroc veins and PT veins. + Reflux noted in GSV >2 secs, prox GSV measuring 8.5mm \par \par 7/3/19 BENNETT/PVR: Right BENNETT 0.68 w/ blunted waveforms and Left BENNETT 1.21

## 2019-12-23 ENCOUNTER — RX RENEWAL (OUTPATIENT)
Age: 69
End: 2019-12-23

## 2020-04-28 ENCOUNTER — RX RENEWAL (OUTPATIENT)
Age: 70
End: 2020-04-28

## 2020-06-17 ENCOUNTER — APPOINTMENT (OUTPATIENT)
Dept: VASCULAR SURGERY | Facility: CLINIC | Age: 70
End: 2020-06-17
Payer: COMMERCIAL

## 2020-06-17 VITALS
BODY MASS INDEX: 39.37 KG/M2 | HEART RATE: 92 BPM | DIASTOLIC BLOOD PRESSURE: 101 MMHG | TEMPERATURE: 97.9 F | HEIGHT: 70 IN | WEIGHT: 275 LBS | SYSTOLIC BLOOD PRESSURE: 150 MMHG

## 2020-06-17 DIAGNOSIS — I83.892 VARICOSE VEINS OF LEFT LOWER EXTREMITY WITH OTHER COMPLICATIONS: ICD-10-CM

## 2020-06-17 DIAGNOSIS — I73.9 PERIPHERAL VASCULAR DISEASE, UNSPECIFIED: ICD-10-CM

## 2020-06-17 PROCEDURE — 99213 OFFICE O/P EST LOW 20 MIN: CPT

## 2020-06-17 PROCEDURE — 93923 UPR/LXTR ART STDY 3+ LVLS: CPT

## 2020-06-17 NOTE — DATA REVIEWED
[FreeTextEntry1] : 1/2/19 Venous Doppler: Left leg chronic recannalized DVT in FV prox-distal, POP V and 2 out of 4 gastroc veins and PT veins. + Reflux noted in GSV >2 secs, prox GSV measuring 8.5mm \par \par 7/3/19 BENNETT/PVR: Right BENNETT 0.68 w/ blunted waveforms and Left BENNETT 1.21 \par \par 6/17/20 BENNETT/PVR: Right BENNETT 0.61 and Left BENNETT 1.03

## 2020-06-17 NOTE — PHYSICAL EXAM
[2+] : left 2+ [0] : right 0 [1+] : left 1+ [Ankle Swelling (On Exam)] : present [Ankle Swelling On The Left] : of the left ankle [Varicose Veins Of Lower Extremities] : present [] : of the left leg [Ankle Swelling On The Right] : mild [Oriented to Person] : oriented to person [Alert] : alert [Oriented to Place] : oriented to place [Oriented to Time] : oriented to time [Calm] : calm [de-identified] : well  [FreeTextEntry1] : Left lateral ankle wound completely healed, trance ankle edema remains. Chronic hypopigmented area w/ mild-moderate xerotic skin.. Bilateral feet are warm. No open wounds on the right leg.  [de-identified] : cooperative  [de-identified] : WDL except ambulates w/ cane

## 2020-06-17 NOTE — ASSESSMENT
[Arterial/Venous Disease] : arterial/venous disease [Foot care/Footwear] : foot care/footwear [Other: _____] : [unfilled] [Ulcer Care] : ulcer care [FreeTextEntry1] : 68 y/o m w/ PVD- LLE VI stable and unchanged. Right leg intermittent claudication, no open wounds. Right BENNETT 0.65 consistent w/ moderate disease and unchanged from 1 year ago\par \par Medical Conservative Management compression stockings, leg elevation, skin moisturizer, diet and weight loss, remain smoke free\par Continue ASA and Statin \par Continue Cilostazol to 100mg BID \par RTO in 6 months for f/u w/ repeat BENNETT/PVR or sooner if s/s worsen (develops rest pain or tissue loss or new problem develops\par

## 2020-06-17 NOTE — REVIEW OF SYSTEMS
[Leg Claudication] : intermittent leg claudication [Difficulty Walking] : difficulty walking [Negative] : Psychiatric [FreeTextEntry9] : right Hip Sx [de-identified] : Chronic back pain

## 2020-06-17 NOTE — HISTORY OF PRESENT ILLNESS
[FreeTextEntry1] : 70 y/o m w/ history of left leg necrotizing skin infection s/p debridement, fasciotomies and skin grafting by another physician in May 2018. He has been tolerating a left leg unna boot for his left ankle wound and swelling w/ home care services 3x week. He presents today for f/u visit but reports he developed a new wound on the left ankle approx 10 days ago. Denies any trauma or injury to the affected area. He removed his boot and showered at home prior to his visit this morning. Reports swelling is significantly improved, and original wound is decreased in size. No fever or chills. He denies pain or claudication.  [de-identified] : pt presents today for f/u visit. Still has home care for left leg unna boot now 2 x week. He reports improvement in outer ankle wound only, the other foot wound remains the same. He says walking and using the exercise bike has made his wound slightly bigger.  Reports drainage and swelling is significantly improved. Denies pain. Denies any other changes since previous visit. \par \par 7/3/19 here for f/u visit. He reports significant improvement in left ankle wound healing. Continuing unna boot 2x week w/ home care. He is c/o right calf claudication when he walks approx 100ft. Reports he has had this for years but mentioning it for the first time today. He also reports having a stent placed in the right leg approx 3 years ago but unsure why. Was seeing vasc. Dr. Maciej Sewell. Denies rest pain. As per pt. he quit smoking 2 years ago \par \par 8/20/19 here for f/u visit on left leg wound and right leg claudication. He was started on Cilostazol w/out relief. He reports his left leg wounds are slowly improving but now has dry skin. Denies pain. \par \par 10/1/19 pt is here for f/u visit. Left leg wound completely healed as of 3 weeks ago. Discharged from home care. Has been compliant w/ skin moisturizer and compression stocking 20-30mmHg. Right leg claudication w/ only slight improvement, remains on Cilostazol 100mg BID. Reports recent weight loss of 10 lbs and is continuing to walk and exercise. No new complaints from previous visit. \par \par 6/17/20 here for f/u on right leg claudication. On ASA, Statin and Cilostazol 100mg BID which as per pt he feels is not helping. He reports right leg claudication at 200-400ft. Denies rest pain. Saw Neurology Dr Emiliana Rosen and had EMG testing yesterday, results pending. He is compliant w/ compression stockings and denies any other new complaints.

## 2020-07-07 ENCOUNTER — RX RENEWAL (OUTPATIENT)
Age: 70
End: 2020-07-07

## 2020-07-08 ENCOUNTER — RX RENEWAL (OUTPATIENT)
Age: 70
End: 2020-07-08

## 2020-07-08 RX ORDER — ATORVASTATIN CALCIUM 20 MG/1
20 TABLET, FILM COATED ORAL
Qty: 90 | Refills: 0 | Status: ACTIVE | COMMUNITY
Start: 2019-07-03 | End: 1900-01-01

## 2020-07-08 RX ORDER — ASPIRIN 81 MG/1
81 TABLET, COATED ORAL
Qty: 90 | Refills: 0 | Status: ACTIVE | COMMUNITY
Start: 2020-07-08 | End: 1900-01-01

## 2020-08-05 ENCOUNTER — APPOINTMENT (OUTPATIENT)
Dept: VASCULAR SURGERY | Facility: CLINIC | Age: 70
End: 2020-08-05

## 2020-10-01 NOTE — DISCHARGE NOTE ADULT - NSTOBACCONEVERSMOKERY/N_GEN_A
Additional Notes: Neurotoxin treatment was performed after the laser treatment Detail Level: Simple Yes

## 2020-10-22 ENCOUNTER — RX RENEWAL (OUTPATIENT)
Age: 70
End: 2020-10-22

## 2021-01-25 ENCOUNTER — RX RENEWAL (OUTPATIENT)
Age: 71
End: 2021-01-25

## 2021-01-25 RX ORDER — CILOSTAZOL 100 MG/1
100 TABLET ORAL TWICE DAILY
Qty: 180 | Refills: 3 | Status: ACTIVE | COMMUNITY
Start: 2019-07-03 | End: 1900-01-01

## 2021-05-13 NOTE — PROCEDURE
[FreeTextEntry1] : Left leg unna boot re applied from toes to below knee w/out complications\par Foam dressing applied to draining lateral ankle wound  Yes

## 2022-08-09 NOTE — ED ADULT NURSE NOTE - NSSISCREENINGQ1_ED_A_ED
Health Call Center    Phone Message    May a detailed message be left on voicemail: yes     Reason for Call: Other: Patient called stating [facility] has not yet received the referral. Patient is requesting for it to be called in to [phone]    Please call in the referral, and let the patient know when this has been complete. Thank you.     Action Taken: Message routed to:  Other: Cardiology    Travel Screening: Not Applicable                         No

## 2022-08-29 NOTE — DISCHARGE NOTE ADULT - PLAN OF CARE
Treat infection You were admitted to the hospital for necrotizing fasciitis of the left leg that was treated surgically. Please continue to take all medications as prescribed and follow up with Dr. Moody after discharge from the hospital. Keep wound vac in place until follow up. 98

## 2023-01-22 NOTE — PROGRESS NOTE ADULT - ASSESSMENT
67y Male who presents with necrotizing soft tissue infection of LLE s/p debridement on 4/6 and Integra graft with VAC on 4/13    Plan:  -DVT PPX  -Pain control   -PT eval today, DC home on Augmentin after home vac arrives 67y Male who presents with necrotizing soft tissue infection of LLE s/p debridement on 4/6 and Integra graft with VAC on 4/13    Plan:  -DVT PPX  -Pain control   -PT eval today, DC on Augmentin after home vac arrives no

## 2023-06-01 NOTE — PROGRESS NOTE ADULT - ASSESSMENT
66 y/o M PMH COPD, PAD, HTN p/w chills/LLE pain, bullae adm for severe sepsis 2/2 GAS necrotizing fasciitis s/p debridement 4/6, w/ course c/b JUNAID (now resolved). Now pending OR Friday for integra. 104

## 2025-01-08 ENCOUNTER — APPOINTMENT (OUTPATIENT)
Dept: VASCULAR SURGERY | Facility: CLINIC | Age: 75
End: 2025-01-08
Payer: MEDICARE

## 2025-01-08 VITALS
HEART RATE: 99 BPM | SYSTOLIC BLOOD PRESSURE: 160 MMHG | WEIGHT: 285 LBS | DIASTOLIC BLOOD PRESSURE: 96 MMHG | HEIGHT: 70 IN | BODY MASS INDEX: 40.8 KG/M2 | TEMPERATURE: 97.6 F

## 2025-01-08 PROCEDURE — 93923 UPR/LXTR ART STDY 3+ LVLS: CPT

## 2025-01-08 PROCEDURE — 99203 OFFICE O/P NEW LOW 30 MIN: CPT

## 2025-04-19 ENCOUNTER — NON-APPOINTMENT (OUTPATIENT)
Age: 75
End: 2025-04-19

## 2025-04-21 ENCOUNTER — NON-APPOINTMENT (OUTPATIENT)
Age: 75
End: 2025-04-21

## 2025-04-21 ENCOUNTER — APPOINTMENT (OUTPATIENT)
Dept: GASTROENTEROLOGY | Facility: CLINIC | Age: 75
End: 2025-04-21
Payer: MEDICARE

## 2025-04-21 DIAGNOSIS — K63.5 POLYP OF COLON: ICD-10-CM

## 2025-04-21 PROCEDURE — 99203 OFFICE O/P NEW LOW 30 MIN: CPT | Mod: 2W

## 2025-04-21 RX ORDER — SODIUM SULFATE, POTASSIUM SULFATE AND MAGNESIUM SULFATE 1.6; 3.13; 17.5 G/177ML; G/177ML; G/177ML
17.5-3.13-1.6 SOLUTION ORAL
Qty: 1 | Refills: 0 | Status: ACTIVE | COMMUNITY
Start: 2025-04-21 | End: 1900-01-01

## 2025-05-12 ENCOUNTER — OUTPATIENT (OUTPATIENT)
Dept: OUTPATIENT SERVICES | Facility: HOSPITAL | Age: 75
LOS: 1 days | End: 2025-05-12

## 2025-05-12 VITALS
SYSTOLIC BLOOD PRESSURE: 121 MMHG | HEIGHT: 69 IN | HEART RATE: 77 BPM | OXYGEN SATURATION: 94 % | RESPIRATION RATE: 16 BRPM | DIASTOLIC BLOOD PRESSURE: 82 MMHG | WEIGHT: 283.96 LBS | TEMPERATURE: 98 F

## 2025-05-12 DIAGNOSIS — Z90.49 ACQUIRED ABSENCE OF OTHER SPECIFIED PARTS OF DIGESTIVE TRACT: Chronic | ICD-10-CM

## 2025-05-12 DIAGNOSIS — Z94.5 SKIN TRANSPLANT STATUS: Chronic | ICD-10-CM

## 2025-05-12 DIAGNOSIS — Z98.890 OTHER SPECIFIED POSTPROCEDURAL STATES: Chronic | ICD-10-CM

## 2025-05-12 DIAGNOSIS — I10 ESSENTIAL (PRIMARY) HYPERTENSION: ICD-10-CM

## 2025-05-12 DIAGNOSIS — K63.5 POLYP OF COLON: ICD-10-CM

## 2025-05-12 DIAGNOSIS — Z91.89 OTHER SPECIFIED PERSONAL RISK FACTORS, NOT ELSEWHERE CLASSIFIED: ICD-10-CM

## 2025-05-12 DIAGNOSIS — J44.9 CHRONIC OBSTRUCTIVE PULMONARY DISEASE, UNSPECIFIED: ICD-10-CM

## 2025-05-12 DIAGNOSIS — Z98.49 CATARACT EXTRACTION STATUS, UNSPECIFIED EYE: Chronic | ICD-10-CM

## 2025-05-12 DIAGNOSIS — I73.9 PERIPHERAL VASCULAR DISEASE, UNSPECIFIED: ICD-10-CM

## 2025-05-12 NOTE — H&P PST ADULT - HISTORY OF PRESENT ILLNESS
73 y/o male with HTN, HLD, COPD, Intermittent Claudication PVD on Cilostazol and colon polyps presents to PST preop for colonoscopy endomucosal resection. Pt recently underwent a colonoscopy April 2025. He states two large colon polyps could not be removed during colonoscopy and was recommend  to have endomucosal resection of polyps.  75 y/o male with HTN, HLD, COPD, Intermittent Claudication, PVD on Cilostazol and colon polyps presents to PST preop for colonoscopy endomucosal resection. Pt recently underwent a colonoscopy April 2025. He states two large colon polyps could not be removed during colonoscopy and was recommend  to have endomucosal resection of polyps.

## 2025-05-12 NOTE — H&P PST ADULT - NEGATIVE NEUROLOGICAL SYMPTOMS
no difficulty walking no transient paralysis/no weakness/no paresthesias/no generalized seizures/no focal seizures/no syncope/no tremors/no vertigo/no loss of sensation/no difficulty walking/no loss of consciousness/no hemiparesis/no confusion/no facial palsy no transient paralysis/no weakness/no paresthesias/no generalized seizures/no focal seizures/no syncope/no tremors/no vertigo/no loss of sensation/no loss of consciousness/no hemiparesis/no confusion/no facial palsy

## 2025-05-12 NOTE — H&P PST ADULT - PROBLEM SELECTOR PROBLEM 5
[FreeTextEntry1] : 41M  to Ella Kwan (40F) comes in with difficulty conceiving. wife with previous pregancy from a previous marriage led to  due to genetic abnormality. patient was previously anabolic steroid user in . was placed on a TRT regimen by another doctor. \par SA: 2018 - \par 1.5 ml/16 mil/ml/69% motlity\par previous TRT crash symptmos\par patient was getting TRT prescribed at 200mg every 10 days\par SA 2020 1ml/azoospermia\par last TRT 7-8 weeks\par +crash\par no fmaily histoyr prostate kideny bladder cancer At risk for sleep apnea

## 2025-05-12 NOTE — H&P PST ADULT - NSICDXPASTMEDICALHX_GEN_ALL_CORE_FT
PAST MEDICAL HISTORY:  Asthma no h/o hospitalization or intubation    COPD (chronic obstructive pulmonary disease)     History of cluster headache     History of DVT (deep vein thrombosis)     History of intermittent claudication     HTN (hypertension)     Lumbar radiculopathy     Migraine     Necrotizing subcutaneous infection     Osteoarthritis     Polyp of colon     Primary osteoarthritis of right hip     PVD (peripheral vascular disease)     Seasonal allergies     Smoking addiction

## 2025-05-12 NOTE — H&P PST ADULT - NSICDXPASTSURGICALHX_GEN_ALL_CORE_FT
PAST SURGICAL HISTORY:  H/O cataract extraction     H/O fasciotomy     H/O skin graft     S/P cholecystectomy     S/P colonoscopy with polypectomy     S/P debridement     S/P hip replacement right, 2012

## 2025-05-12 NOTE — H&P PST ADULT - NECK
Conjuntivae and eyelids appear normal, Sclerae : White without injection FROM/supple/symmetric/no palpable masses

## 2025-05-12 NOTE — H&P PST ADULT - MUSCULOSKELETAL COMMENTS
hx of necrotizing subcutaneous infection of left leg s/p debridement, fasciotomy and  skin grafting in May 2018

## 2025-05-12 NOTE — H&P PST ADULT - RESPIRATORY AND THORAX COMMENTS
COPD on inhalers. Denies hx of exacerbation and intubation hx. COPD on inhalers. Denies hx of COPD exacerbation.

## 2025-05-12 NOTE — H&P PST ADULT - ASSESSMENT
75 y/o male with HTN, HLD, COPD, Intermittent Claudication, PVD on Cilostazol and colon polyps presents to PST preop for colonoscopy endomucosal resection. Pt recently underwent a colonoscopy April 2025. He states two large colon polyps could not be removed during colonoscopy and was recommend  to have endomucosal resection of polyps.

## 2025-05-12 NOTE — H&P PST ADULT - CARDIOVASCULAR SYMPTOMS
hx of intermittent claudication- currently stable. As per pt, not getting progressively worse./claudication hx of intermittent claudication- currently stable. As per pt, claudication is getting progressively worse./claudication

## 2025-05-12 NOTE — H&P PST ADULT - PROBLEM SELECTOR PLAN 1
preop for colonoscopy endomucosal resection on 5/16/25  preop instructions given, pt verbalized understanding  GI prophylaxis provided

## 2025-05-15 NOTE — ASU PATIENT PROFILE, ADULT - FALL HARM RISK - RISK INTERVENTIONS

## 2025-05-16 ENCOUNTER — TRANSCRIPTION ENCOUNTER (OUTPATIENT)
Age: 75
End: 2025-05-16

## 2025-05-16 ENCOUNTER — RESULT REVIEW (OUTPATIENT)
Age: 75
End: 2025-05-16

## 2025-05-16 ENCOUNTER — APPOINTMENT (OUTPATIENT)
Dept: GASTROENTEROLOGY | Facility: HOSPITAL | Age: 75
End: 2025-05-16

## 2025-05-16 ENCOUNTER — OUTPATIENT (OUTPATIENT)
Dept: OUTPATIENT SERVICES | Facility: HOSPITAL | Age: 75
LOS: 1 days | End: 2025-05-16
Payer: MEDICARE

## 2025-05-16 VITALS
HEIGHT: 69 IN | DIASTOLIC BLOOD PRESSURE: 81 MMHG | OXYGEN SATURATION: 93 % | HEART RATE: 99 BPM | TEMPERATURE: 97 F | RESPIRATION RATE: 17 BRPM | SYSTOLIC BLOOD PRESSURE: 119 MMHG | WEIGHT: 283.96 LBS

## 2025-05-16 VITALS
SYSTOLIC BLOOD PRESSURE: 102 MMHG | RESPIRATION RATE: 16 BRPM | DIASTOLIC BLOOD PRESSURE: 67 MMHG | HEART RATE: 79 BPM | OXYGEN SATURATION: 95 %

## 2025-05-16 DIAGNOSIS — Z98.890 OTHER SPECIFIED POSTPROCEDURAL STATES: Chronic | ICD-10-CM

## 2025-05-16 DIAGNOSIS — Z98.49 CATARACT EXTRACTION STATUS, UNSPECIFIED EYE: Chronic | ICD-10-CM

## 2025-05-16 DIAGNOSIS — K63.5 POLYP OF COLON: ICD-10-CM

## 2025-05-16 DIAGNOSIS — Z90.49 ACQUIRED ABSENCE OF OTHER SPECIFIED PARTS OF DIGESTIVE TRACT: Chronic | ICD-10-CM

## 2025-05-16 DIAGNOSIS — Z94.5 SKIN TRANSPLANT STATUS: Chronic | ICD-10-CM

## 2025-05-16 PROCEDURE — 45390 COLONOSCOPY W/RESECTION: CPT | Mod: GC

## 2025-05-16 PROCEDURE — 45384 COLONOSCOPY W/LESION REMOVAL: CPT | Mod: XU,GC

## 2025-05-16 PROCEDURE — 88305 TISSUE EXAM BY PATHOLOGIST: CPT | Mod: 26

## 2025-05-16 DEVICE — CLIP RESOLUTION 360 235CM: Type: IMPLANTABLE DEVICE | Status: FUNCTIONAL

## 2025-05-16 DEVICE — CLIP RESOLUTION 360 ULTRA 235CM 20/BX: Type: IMPLANTABLE DEVICE | Status: FUNCTIONAL

## 2025-05-16 DEVICE — CLIP MANTIS 235CMX2.8MM: Type: IMPLANTABLE DEVICE | Status: FUNCTIONAL

## 2025-05-16 RX ORDER — TIOTROPIUM BROMIDE INHALATION SPRAY 3.12 UG/1
1 SPRAY, METERED RESPIRATORY (INHALATION)
Refills: 0 | DISCHARGE

## 2025-05-16 RX ORDER — SODIUM CHLORIDE 9 G/1000ML
1000 INJECTION, SOLUTION INTRAVENOUS
Refills: 0 | Status: ACTIVE | OUTPATIENT
Start: 2025-05-16 | End: 2026-04-14

## 2025-05-16 RX ORDER — CILOSTAZOL 50 MG/1
1 TABLET ORAL
Refills: 0 | DISCHARGE

## 2025-05-16 RX ORDER — ATORVASTATIN CALCIUM 80 MG/1
1 TABLET, FILM COATED ORAL
Refills: 0 | DISCHARGE

## 2025-05-16 RX ORDER — PREGABALIN 75 MG/1
1 CAPSULE ORAL
Refills: 0 | DISCHARGE

## 2025-05-16 RX ADMIN — SODIUM CHLORIDE 30 MILLILITER(S): 9 INJECTION, SOLUTION INTRAVENOUS at 07:41

## 2025-05-16 NOTE — PRE PROCEDURE NOTE - PROCEDURE SERVICE
Endoscopy
Patient called and when we call him back he would like to be called on his cell phone.   
Endoscopy

## 2025-05-16 NOTE — ASU DISCHARGE PLAN (ADULT/PEDIATRIC) - FINANCIAL ASSISTANCE
[facility] provides services at a reduced cost to those who are determined to be eligible through [facility]’s financial assistance program. Information regarding [facility]’s financial assistance program can be found by going to [email or web address] or by calling [phone].

## 2025-05-16 NOTE — PRE PROCEDURE NOTE - PRE PROCEDURE EVALUATION
Attending Physician:     Laureano               Procedure: colonoscopy     Indication for Procedure: descending colon YUNG  ________________________________________________________  PAST MEDICAL & SURGICAL HISTORY:  HTN (hypertension)      Asthma  no h/o hospitalization or intubation      Seasonal allergies      Primary osteoarthritis of right hip      Smoking addiction      Migraine      COPD (chronic obstructive pulmonary disease)      PVD (peripheral vascular disease)      Polyp of colon      History of DVT (deep vein thrombosis)      History of intermittent claudication      Lumbar radiculopathy      Osteoarthritis      Necrotizing subcutaneous infection      History of cluster headache      S/P hip replacement  right, 2012      S/P cholecystectomy      S/P colonoscopy with polypectomy      H/O cataract extraction      H/O fasciotomy      S/P debridement      H/O skin graft        ALLERGIES:  Depakote (Rash)    HOME MEDICATIONS:  atorvastatin 20 mg oral tablet: 1 tab(s) orally once a day  cilostazol 100 mg oral tablet: 1 tab(s) orally 2 times a day  Claritin 10 mg oral tablet: 1 tab(s) orally once a day  hydroCHLOROthiazide 12.5 mg oral tablet: 1 tab(s) orally once a day  pregabalin 100 mg oral capsule: 1 cap(s) orally 3 times a day  propranolol 80 mg oral capsule, extended release: 1 cap(s) orally once a day  Singulair 10 mg oral tablet: 1 tab(s) orally once a day  Spiriva 18 mcg inhalation capsule: 1 puff(s) inhaled once a day  Wixela Inhub 250 mcg-50 mcg/inh inhalation powder: 2 puff(s) inhaled once a day    AICD/PPM: [ ] yes   [ ] no    PERTINENT LAB DATA:                      PHYSICAL EXAMINATION:    Height (cm): 175.3  Weight (kg): 128.8  BMI (kg/m2): 41.9  BSA (m2): 2.4T(C): 36  HR: 99  BP: 119/81  RR: 17  SpO2: 93%    Constitutional: NAD  Neck:  supple  Respiratory: no respiratory stress, no audible wheezes  Cardiovascular: RR  Gastrointestinal: soft, NT/ND  Extremities: No peripheral edema  Neurological: Alert, no focal deficits  Psychiatric: Normal mood, normal affect  Skin: No rashes      COMMENTS:    The patient is a suitable candidate for the planned procedure unless box checked [ ]  No, explain:    
   Pre Endoscopy History and Physicial    Attending Physician:  Dr. Tinsley  Procedure: Colonoscopy with Endoscopic Mucosal Resection (EMR)   Indication for Procedure: on CF found 2 descending colon polyps (1: IS, 2 IIa) s/p tattoo at both sites  ASA Class: 2  ________________________________________________________  PAST MEDICAL & SURGICAL HISTORY:  HTN (hypertension)      Asthma  no h/o hospitalization or intubation      Seasonal allergies      Primary osteoarthritis of right hip      Smoking addiction      Migraine      COPD (chronic obstructive pulmonary disease)      PVD (peripheral vascular disease)      Polyp of colon      History of DVT (deep vein thrombosis)      History of intermittent claudication      Lumbar radiculopathy      Osteoarthritis      Necrotizing subcutaneous infection      History of cluster headache      S/P hip replacement  right, 2012      S/P cholecystectomy      S/P colonoscopy with polypectomy      H/O cataract extraction      H/O fasciotomy      S/P debridement      H/O skin graft        ALLERGIES:  Depakote (Rash)    HOME MEDICATIONS:  atorvastatin 20 mg oral tablet: 1 tab(s) orally once a day  cilostazol 100 mg oral tablet: 1 tab(s) orally 2 times a day  Claritin 10 mg oral tablet: 1 tab(s) orally once a day  hydroCHLOROthiazide 12.5 mg oral tablet: 1 tab(s) orally once a day  pregabalin 100 mg oral capsule: 1 cap(s) orally 3 times a day  propranolol 80 mg oral capsule, extended release: 1 cap(s) orally once a day  Singulair 10 mg oral tablet: 1 tab(s) orally once a day  Spiriva 18 mcg inhalation capsule: 1 puff(s) inhaled once a day  Wixela Inhub 250 mcg-50 mcg/inh inhalation powder: 2 puff(s) inhaled once a day    AICD/PPM: [ ] yes   [ ] no    PERTINENT LAB DATA:                      PHYSICAL EXAMINATION:    T(C): --  HR: --  BP: --  RR: --  SpO2: --    Exam;  Gen: Pleasant, well appearing, no distress  Neuro AAO x 3  HEENT: Normal  CV: Appears well perfused  Pulm: Unlabored  Abd: Soft, not tender, not distended.    COMMENTS:  The patient is a suitable candidate for the planned procedure unless box checked [ ]  No, explain:  Risks and alternatives to the procedure discussed in detail. All questions answered.

## 2025-05-16 NOTE — ASU DISCHARGE PLAN (ADULT/PEDIATRIC) - NS MD DC FALL RISK RISK
For information on Fall & Injury Prevention, visit: [email or web address] OR  [email or web address] OR  https://www.cdc.gov/steadi/patient.html

## 2025-05-20 LAB — SURGICAL PATHOLOGY STUDY: SIGNIFICANT CHANGE UP

## 2025-05-21 ENCOUNTER — NON-APPOINTMENT (OUTPATIENT)
Age: 75
End: 2025-05-21

## 2025-05-28 NOTE — ASU PATIENT PROFILE, ADULT - AS SC BRADEN SENSORY
SURVEY:    Thank you for allowing us to care for you today.    You may be receiving a survey from [facility] regarding your visit today- electronically or via mail.      Please help us by completing the survey as this will provide the needed feedback to ensure we are providing the very best care for you and your family.    If you cannot score us a very good on any question, please call the office to discuss how we could have made your experience a very good one.    Thank you.       STAFF:    Letha Giron, Wandy JIMENEZ      CLINICAL STAFF:    Rupinder EDDY, Ely JIMENEZ, Danya JIMENEZ, Lazara EDDY   (4) no impairment

## 2025-06-17 NOTE — PATIENT PROFILE ADULT. - PMH
Continue PO pantoprazole BID   Smoking addiction  Primary osteoarthritis of right hip  Seasonal allergies  Asthma  HTN (hypertension)

## 2025-08-11 ENCOUNTER — APPOINTMENT (OUTPATIENT)
Dept: GASTROENTEROLOGY | Facility: CLINIC | Age: 75
End: 2025-08-11
Payer: MEDICARE

## 2025-08-11 DIAGNOSIS — K63.5 POLYP OF COLON: ICD-10-CM

## 2025-08-11 PROCEDURE — 99214 OFFICE O/P EST MOD 30 MIN: CPT | Mod: 2W

## (undated) DEVICE — Device

## (undated) DEVICE — CATH IV SAFE BC 22G X 1" (BLUE)

## (undated) DEVICE — BIOPSY FORCEP RADIAL JAW 4 STANDARD WITH NEEDLE

## (undated) DEVICE — DRSG CURITY GAUZE SPONGE 4 X 4" 12-PLY NON-STERILE

## (undated) DEVICE — ELCTR GROUNDING PAD ADULT COVIDIEN

## (undated) DEVICE — LUBRICATING JELLY HR ONE SHOT 3G

## (undated) DEVICE — CONTAINER FORMALIN 80ML YELLOW

## (undated) DEVICE — SNARE LESIONHUNTER ROTAT NITNL COLD 10MM

## (undated) DEVICE — PACK IV START WITH CHG

## (undated) DEVICE — SNARE CAPTIVATOR II RND STIFF 15MM

## (undated) DEVICE — LIFTER SYR EVERLIFT AGENT SUBMUCOSAL 10ML BLU

## (undated) DEVICE — BASIN EMESIS 10IN GRADUATED MAUVE

## (undated) DEVICE — BIOPSY FORCEP COLD DISP

## (undated) DEVICE — DRSG 2X2

## (undated) DEVICE — GOWN LG

## (undated) DEVICE — TUBING SUCTION NONCONDUCTIVE 6MM X 12FT

## (undated) DEVICE — DRSG BANDAID 0.75X3"

## (undated) DEVICE — TUBING IV SET GRAVITY 3Y 100" MACRO

## (undated) DEVICE — ELCTR ECG CONDUCTIVE ADHESIVE

## (undated) DEVICE — ATTACHMENT DISTAL 4X15MM

## (undated) DEVICE — TUBING MEDI-VAC W MAXIGRIP CONNECTORS 1/4"X6'

## (undated) DEVICE — SALIVA EJECTOR (BLUE)

## (undated) DEVICE — POLY TRAP ETRAP